# Patient Record
Sex: FEMALE | Race: WHITE | NOT HISPANIC OR LATINO | Employment: OTHER | URBAN - METROPOLITAN AREA
[De-identification: names, ages, dates, MRNs, and addresses within clinical notes are randomized per-mention and may not be internally consistent; named-entity substitution may affect disease eponyms.]

---

## 2018-05-24 ENCOUNTER — TRANSCRIBE ORDERS (OUTPATIENT)
Dept: ADMINISTRATIVE | Facility: HOSPITAL | Age: 53
End: 2018-05-24

## 2018-05-24 DIAGNOSIS — Z12.31 VISIT FOR SCREENING MAMMOGRAM: Primary | ICD-10-CM

## 2018-06-11 ENCOUNTER — APPOINTMENT (OUTPATIENT)
Dept: MAMMOGRAPHY | Facility: HOSPITAL | Age: 53
End: 2018-06-11

## 2022-01-26 ENCOUNTER — APPOINTMENT (OUTPATIENT)
Dept: MRI IMAGING | Facility: HOSPITAL | Age: 57
End: 2022-01-26

## 2022-01-26 ENCOUNTER — APPOINTMENT (OUTPATIENT)
Dept: GENERAL RADIOLOGY | Facility: HOSPITAL | Age: 57
End: 2022-01-26

## 2022-01-26 ENCOUNTER — APPOINTMENT (OUTPATIENT)
Dept: CT IMAGING | Facility: HOSPITAL | Age: 57
End: 2022-01-26

## 2022-01-26 ENCOUNTER — HOSPITAL ENCOUNTER (INPATIENT)
Facility: HOSPITAL | Age: 57
LOS: 2 days | Discharge: NURSING, REHAB OR LONG-TERM CARE | End: 2022-01-28
Attending: EMERGENCY MEDICINE | Admitting: FAMILY MEDICINE

## 2022-01-26 DIAGNOSIS — I16.1 HYPERTENSIVE EMERGENCY: ICD-10-CM

## 2022-01-26 DIAGNOSIS — I63.9 CEREBROVASCULAR ACCIDENT (CVA), UNSPECIFIED MECHANISM: Primary | ICD-10-CM

## 2022-01-26 LAB
ALBUMIN SERPL-MCNC: 4.8 G/DL (ref 3.5–5.2)
ALBUMIN/GLOB SERPL: 1.4 G/DL
ALP SERPL-CCNC: 134 U/L (ref 39–117)
ALT SERPL W P-5'-P-CCNC: 32 U/L (ref 1–33)
ANION GAP SERPL CALCULATED.3IONS-SCNC: 20.3 MMOL/L (ref 5–15)
AST SERPL-CCNC: 19 U/L (ref 1–32)
BACTERIA UR QL AUTO: ABNORMAL /HPF
BASOPHILS # BLD AUTO: 0.02 10*3/MM3 (ref 0–0.2)
BASOPHILS NFR BLD AUTO: 0.3 % (ref 0–1.5)
BILIRUB SERPL-MCNC: 0.8 MG/DL (ref 0–1.2)
BILIRUB UR QL STRIP: NEGATIVE
BUN SERPL-MCNC: 19 MG/DL (ref 6–20)
BUN/CREAT SERPL: 24.4 (ref 7–25)
CALCIUM SPEC-SCNC: 10.6 MG/DL (ref 8.6–10.5)
CHLORIDE SERPL-SCNC: 93 MMOL/L (ref 98–107)
CLARITY UR: CLEAR
CO2 SERPL-SCNC: 21.7 MMOL/L (ref 22–29)
COLOR UR: YELLOW
CREAT SERPL-MCNC: 0.78 MG/DL (ref 0.57–1)
DEPRECATED RDW RBC AUTO: 35.9 FL (ref 37–54)
EOSINOPHIL # BLD AUTO: 0 10*3/MM3 (ref 0–0.4)
EOSINOPHIL NFR BLD AUTO: 0 % (ref 0.3–6.2)
ERYTHROCYTE [DISTWIDTH] IN BLOOD BY AUTOMATED COUNT: 11.2 % (ref 12.3–15.4)
GFR SERPL CREATININE-BSD FRML MDRD: 76 ML/MIN/1.73
GLOBULIN UR ELPH-MCNC: 3.4 GM/DL
GLUCOSE BLDC GLUCOMTR-MCNC: 264 MG/DL (ref 70–130)
GLUCOSE BLDC GLUCOMTR-MCNC: 267 MG/DL (ref 70–130)
GLUCOSE BLDC GLUCOMTR-MCNC: 290 MG/DL (ref 70–130)
GLUCOSE SERPL-MCNC: 346 MG/DL (ref 65–99)
GLUCOSE UR STRIP-MCNC: ABNORMAL MG/DL
HBA1C MFR BLD: 12.3 % (ref 4.8–5.6)
HCT VFR BLD AUTO: 52.3 % (ref 34–46.6)
HGB BLD-MCNC: 18.9 G/DL (ref 12–15.9)
HGB UR QL STRIP.AUTO: NEGATIVE
HOLD SPECIMEN: NORMAL
HOLD SPECIMEN: NORMAL
HYALINE CASTS UR QL AUTO: ABNORMAL /LPF
IMM GRANULOCYTES # BLD AUTO: 0.02 10*3/MM3 (ref 0–0.05)
IMM GRANULOCYTES NFR BLD AUTO: 0.3 % (ref 0–0.5)
KETONES UR QL STRIP: ABNORMAL
LEUKOCYTE ESTERASE UR QL STRIP.AUTO: NEGATIVE
LIPASE SERPL-CCNC: 36 U/L (ref 13–60)
LYMPHOCYTES # BLD AUTO: 1.41 10*3/MM3 (ref 0.7–3.1)
LYMPHOCYTES NFR BLD AUTO: 24.6 % (ref 19.6–45.3)
MCH RBC QN AUTO: 32 PG (ref 26.6–33)
MCHC RBC AUTO-ENTMCNC: 36.1 G/DL (ref 31.5–35.7)
MCV RBC AUTO: 88.6 FL (ref 79–97)
MONOCYTES # BLD AUTO: 0.25 10*3/MM3 (ref 0.1–0.9)
MONOCYTES NFR BLD AUTO: 4.4 % (ref 5–12)
NEUTROPHILS NFR BLD AUTO: 4.04 10*3/MM3 (ref 1.7–7)
NEUTROPHILS NFR BLD AUTO: 70.4 % (ref 42.7–76)
NITRITE UR QL STRIP: NEGATIVE
NRBC BLD AUTO-RTO: 0 /100 WBC (ref 0–0.2)
PH UR STRIP.AUTO: <=5 [PH] (ref 5–8)
PLATELET # BLD AUTO: 202 10*3/MM3 (ref 140–450)
PMV BLD AUTO: 10.2 FL (ref 6–12)
POTASSIUM SERPL-SCNC: 3.7 MMOL/L (ref 3.5–5.2)
PROT SERPL-MCNC: 8.2 G/DL (ref 6–8.5)
PROT UR QL STRIP: ABNORMAL
RBC # BLD AUTO: 5.9 10*6/MM3 (ref 3.77–5.28)
RBC # UR STRIP: ABNORMAL /HPF
REF LAB TEST METHOD: ABNORMAL
SODIUM SERPL-SCNC: 135 MMOL/L (ref 136–145)
SP GR UR STRIP: >=1.03 (ref 1–1.03)
SQUAMOUS #/AREA URNS HPF: ABNORMAL /HPF
TROPONIN T SERPL-MCNC: <0.01 NG/ML (ref 0–0.03)
UROBILINOGEN UR QL STRIP: ABNORMAL
WBC # UR STRIP: ABNORMAL /HPF
WBC NRBC COR # BLD: 5.74 10*3/MM3 (ref 3.4–10.8)
WHOLE BLOOD HOLD SPECIMEN: NORMAL
WHOLE BLOOD HOLD SPECIMEN: NORMAL

## 2022-01-26 PROCEDURE — 85025 COMPLETE CBC W/AUTO DIFF WBC: CPT | Performed by: EMERGENCY MEDICINE

## 2022-01-26 PROCEDURE — 25010000002 IOPAMIDOL 61 % SOLUTION: Performed by: EMERGENCY MEDICINE

## 2022-01-26 PROCEDURE — 83036 HEMOGLOBIN GLYCOSYLATED A1C: CPT | Performed by: EMERGENCY MEDICINE

## 2022-01-26 PROCEDURE — 80053 COMPREHEN METABOLIC PANEL: CPT | Performed by: EMERGENCY MEDICINE

## 2022-01-26 PROCEDURE — 71045 X-RAY EXAM CHEST 1 VIEW: CPT

## 2022-01-26 PROCEDURE — 70496 CT ANGIOGRAPHY HEAD: CPT

## 2022-01-26 PROCEDURE — 63710000001 INSULIN ASPART PER 5 UNITS

## 2022-01-26 PROCEDURE — 82962 GLUCOSE BLOOD TEST: CPT

## 2022-01-26 PROCEDURE — 70450 CT HEAD/BRAIN W/O DYE: CPT

## 2022-01-26 PROCEDURE — 84484 ASSAY OF TROPONIN QUANT: CPT | Performed by: EMERGENCY MEDICINE

## 2022-01-26 PROCEDURE — 99285 EMERGENCY DEPT VISIT HI MDM: CPT

## 2022-01-26 PROCEDURE — 70551 MRI BRAIN STEM W/O DYE: CPT

## 2022-01-26 PROCEDURE — 99223 1ST HOSP IP/OBS HIGH 75: CPT | Performed by: FAMILY MEDICINE

## 2022-01-26 PROCEDURE — 81001 URINALYSIS AUTO W/SCOPE: CPT | Performed by: EMERGENCY MEDICINE

## 2022-01-26 PROCEDURE — 83690 ASSAY OF LIPASE: CPT | Performed by: EMERGENCY MEDICINE

## 2022-01-26 PROCEDURE — 99222 1ST HOSP IP/OBS MODERATE 55: CPT | Performed by: PSYCHIATRY & NEUROLOGY

## 2022-01-26 PROCEDURE — 93005 ELECTROCARDIOGRAM TRACING: CPT | Performed by: EMERGENCY MEDICINE

## 2022-01-26 PROCEDURE — 70498 CT ANGIOGRAPHY NECK: CPT

## 2022-01-26 RX ORDER — SODIUM CHLORIDE 0.9 % (FLUSH) 0.9 %
10 SYRINGE (ML) INJECTION AS NEEDED
Status: DISCONTINUED | OUTPATIENT
Start: 2022-01-26 | End: 2022-01-28 | Stop reason: HOSPADM

## 2022-01-26 RX ORDER — CALCIUM CARBONATE 200(500)MG
1 TABLET,CHEWABLE ORAL 2 TIMES DAILY PRN
Status: DISCONTINUED | OUTPATIENT
Start: 2022-01-26 | End: 2022-01-28 | Stop reason: HOSPADM

## 2022-01-26 RX ORDER — ONDANSETRON 4 MG/1
4 TABLET, FILM COATED ORAL EVERY 6 HOURS PRN
Status: DISCONTINUED | OUTPATIENT
Start: 2022-01-26 | End: 2022-01-28 | Stop reason: HOSPADM

## 2022-01-26 RX ORDER — ACETAMINOPHEN 325 MG/1
650 TABLET ORAL EVERY 4 HOURS PRN
Status: DISCONTINUED | OUTPATIENT
Start: 2022-01-26 | End: 2022-01-28 | Stop reason: HOSPADM

## 2022-01-26 RX ORDER — NICOTINE POLACRILEX 4 MG
1 LOZENGE BUCCAL
Status: DISCONTINUED | OUTPATIENT
Start: 2022-01-26 | End: 2022-01-28 | Stop reason: HOSPADM

## 2022-01-26 RX ORDER — ATORVASTATIN CALCIUM 80 MG/1
80 TABLET, FILM COATED ORAL NIGHTLY
Status: DISCONTINUED | OUTPATIENT
Start: 2022-01-26 | End: 2022-01-28 | Stop reason: HOSPADM

## 2022-01-26 RX ORDER — SODIUM CHLORIDE 9 MG/ML
50 INJECTION, SOLUTION INTRAVENOUS CONTINUOUS
Status: DISCONTINUED | OUTPATIENT
Start: 2022-01-26 | End: 2022-01-27

## 2022-01-26 RX ORDER — SODIUM CHLORIDE 0.9 % (FLUSH) 0.9 %
10 SYRINGE (ML) INJECTION EVERY 12 HOURS SCHEDULED
Status: DISCONTINUED | OUTPATIENT
Start: 2022-01-26 | End: 2022-01-28 | Stop reason: HOSPADM

## 2022-01-26 RX ORDER — ONDANSETRON 2 MG/ML
4 INJECTION INTRAMUSCULAR; INTRAVENOUS EVERY 6 HOURS PRN
Status: DISCONTINUED | OUTPATIENT
Start: 2022-01-26 | End: 2022-01-28 | Stop reason: HOSPADM

## 2022-01-26 RX ORDER — ALPRAZOLAM 0.25 MG/1
0.25 TABLET ORAL 2 TIMES DAILY PRN
Status: DISCONTINUED | OUTPATIENT
Start: 2022-01-26 | End: 2022-01-28 | Stop reason: HOSPADM

## 2022-01-26 RX ORDER — ASPIRIN 81 MG/1
324 TABLET, CHEWABLE ORAL ONCE
Status: COMPLETED | OUTPATIENT
Start: 2022-01-26 | End: 2022-01-26

## 2022-01-26 RX ORDER — ACETAMINOPHEN 650 MG/1
650 SUPPOSITORY RECTAL EVERY 4 HOURS PRN
Status: DISCONTINUED | OUTPATIENT
Start: 2022-01-26 | End: 2022-01-28 | Stop reason: HOSPADM

## 2022-01-26 RX ORDER — DEXTROSE MONOHYDRATE 25 G/50ML
25 INJECTION, SOLUTION INTRAVENOUS
Status: DISCONTINUED | OUTPATIENT
Start: 2022-01-26 | End: 2022-01-28 | Stop reason: HOSPADM

## 2022-01-26 RX ORDER — ASPIRIN 300 MG/1
300 SUPPOSITORY RECTAL DAILY
Status: DISCONTINUED | OUTPATIENT
Start: 2022-01-27 | End: 2022-01-28 | Stop reason: HOSPADM

## 2022-01-26 RX ORDER — METOPROLOL TARTRATE 5 MG/5ML
5 INJECTION INTRAVENOUS ONCE
Status: COMPLETED | OUTPATIENT
Start: 2022-01-26 | End: 2022-01-26

## 2022-01-26 RX ORDER — ASPIRIN 325 MG
325 TABLET ORAL DAILY
Status: DISCONTINUED | OUTPATIENT
Start: 2022-01-27 | End: 2022-01-28 | Stop reason: HOSPADM

## 2022-01-26 RX ADMIN — NICARDIPINE HYDROCHLORIDE 5 MG/HR: 0.1 INJECTION, SOLUTION INTRAVENOUS at 14:19

## 2022-01-26 RX ADMIN — INSULIN ASPART 6 UNITS: 100 INJECTION, SOLUTION INTRAVENOUS; SUBCUTANEOUS at 18:38

## 2022-01-26 RX ADMIN — ASPIRIN 81 MG CHEWABLE TABLET 324 MG: 81 TABLET CHEWABLE at 13:59

## 2022-01-26 RX ADMIN — ATORVASTATIN CALCIUM 80 MG: 80 TABLET, FILM COATED ORAL at 21:09

## 2022-01-26 RX ADMIN — NICARDIPINE HYDROCHLORIDE 10 MG/HR: 0.1 INJECTION, SOLUTION INTRAVENOUS at 20:44

## 2022-01-26 RX ADMIN — IOPAMIDOL 100 ML: 612 INJECTION, SOLUTION INTRAVENOUS at 13:50

## 2022-01-26 RX ADMIN — NICARDIPINE HYDROCHLORIDE 10 MG/HR: 0.1 INJECTION, SOLUTION INTRAVENOUS at 22:42

## 2022-01-26 RX ADMIN — SODIUM CHLORIDE, PRESERVATIVE FREE 10 ML: 5 INJECTION INTRAVENOUS at 21:09

## 2022-01-26 RX ADMIN — SODIUM CHLORIDE 50 ML/HR: 9 INJECTION, SOLUTION INTRAVENOUS at 21:05

## 2022-01-26 RX ADMIN — NICARDIPINE HYDROCHLORIDE 10 MG/HR: 0.1 INJECTION, SOLUTION INTRAVENOUS at 18:38

## 2022-01-26 RX ADMIN — ALPRAZOLAM 0.25 MG: 0.25 TABLET ORAL at 21:09

## 2022-01-26 RX ADMIN — METOPROLOL TARTRATE 5 MG: 1 INJECTION, SOLUTION INTRAVENOUS at 13:59

## 2022-01-27 ENCOUNTER — APPOINTMENT (OUTPATIENT)
Dept: CARDIOLOGY | Facility: HOSPITAL | Age: 57
End: 2022-01-27

## 2022-01-27 LAB
ANION GAP SERPL CALCULATED.3IONS-SCNC: 18 MMOL/L (ref 5–15)
BH CV ECHO MEAS - AO MAX PG (FULL): 4.9 MMHG
BH CV ECHO MEAS - AO MAX PG: 11 MMHG
BH CV ECHO MEAS - AO MEAN PG (FULL): 2 MMHG
BH CV ECHO MEAS - AO MEAN PG: 5 MMHG
BH CV ECHO MEAS - AO ROOT AREA (BSA CORRECTED): 1.5
BH CV ECHO MEAS - AO ROOT AREA: 5.3 CM^2
BH CV ECHO MEAS - AO ROOT DIAM: 2.6 CM
BH CV ECHO MEAS - AO V2 MAX: 168 CM/SEC
BH CV ECHO MEAS - AO V2 MEAN: 106 CM/SEC
BH CV ECHO MEAS - AO V2 VTI: 30.7 CM
BH CV ECHO MEAS - ASC AORTA: 2.6 CM
BH CV ECHO MEAS - AVA(I,A): 1.7 CM^2
BH CV ECHO MEAS - AVA(I,D): 1.7 CM^2
BH CV ECHO MEAS - AVA(V,A): 2 CM^2
BH CV ECHO MEAS - AVA(V,D): 2 CM^2
BH CV ECHO MEAS - BSA(HAYCOCK): 1.7 M^2
BH CV ECHO MEAS - BSA: 1.7 M^2
BH CV ECHO MEAS - BZI_BMI: 24.2 KILOGRAMS/M^2
BH CV ECHO MEAS - BZI_METRIC_HEIGHT: 162.6 CM
BH CV ECHO MEAS - BZI_METRIC_WEIGHT: 64 KG
BH CV ECHO MEAS - EDV(CUBED): 41.1 ML
BH CV ECHO MEAS - EDV(MOD-SP2): 74.1 ML
BH CV ECHO MEAS - EDV(MOD-SP4): 64.9 ML
BH CV ECHO MEAS - EDV(TEICH): 49.1 ML
BH CV ECHO MEAS - EF(CUBED): 94.4 %
BH CV ECHO MEAS - EF(MOD-BP): 78 %
BH CV ECHO MEAS - EF(MOD-SP2): 83 %
BH CV ECHO MEAS - EF(MOD-SP4): 72.3 %
BH CV ECHO MEAS - EF(TEICH): 91.2 %
BH CV ECHO MEAS - ESV(CUBED): 2.3 ML
BH CV ECHO MEAS - ESV(MOD-SP2): 12.6 ML
BH CV ECHO MEAS - ESV(MOD-SP4): 18 ML
BH CV ECHO MEAS - ESV(TEICH): 4.3 ML
BH CV ECHO MEAS - FS: 61.7 %
BH CV ECHO MEAS - IVS/LVPW: 1
BH CV ECHO MEAS - IVSD: 1.4 CM
BH CV ECHO MEAS - LA DIMENSION: 2.4 CM
BH CV ECHO MEAS - LA/AO: 0.93
BH CV ECHO MEAS - LAD MAJOR: 4.7 CM
BH CV ECHO MEAS - LAT PEAK E' VEL: 5.3 CM/SEC
BH CV ECHO MEAS - LATERAL E/E' RATIO: 11.8
BH CV ECHO MEAS - LV DIASTOLIC VOL/BSA (35-75): 38.5 ML/M^2
BH CV ECHO MEAS - LV MASS(C)D: 166.6 GRAMS
BH CV ECHO MEAS - LV MASS(C)DI: 98.8 GRAMS/M^2
BH CV ECHO MEAS - LV MAX PG: 6.1 MMHG
BH CV ECHO MEAS - LV MEAN PG: 3 MMHG
BH CV ECHO MEAS - LV SYSTOLIC VOL/BSA (12-30): 10.7 ML/M^2
BH CV ECHO MEAS - LV V1 MAX: 123 CM/SEC
BH CV ECHO MEAS - LV V1 MEAN: 82.8 CM/SEC
BH CV ECHO MEAS - LV V1 VTI: 18.6 CM
BH CV ECHO MEAS - LVIDD: 3.5 CM
BH CV ECHO MEAS - LVIDS: 1.3 CM
BH CV ECHO MEAS - LVLD AP2: 6.8 CM
BH CV ECHO MEAS - LVLD AP4: 6.9 CM
BH CV ECHO MEAS - LVLS AP2: 5.3 CM
BH CV ECHO MEAS - LVLS AP4: 4 CM
BH CV ECHO MEAS - LVOT AREA (M): 2.8 CM^2
BH CV ECHO MEAS - LVOT AREA: 2.7 CM^2
BH CV ECHO MEAS - LVOT DIAM: 1.9 CM
BH CV ECHO MEAS - LVPWD: 1.4 CM
BH CV ECHO MEAS - MED PEAK E' VEL: 4.7 CM/SEC
BH CV ECHO MEAS - MEDIAL E/E' RATIO: 13.5
BH CV ECHO MEAS - MV A MAX VEL: 82 CM/SEC
BH CV ECHO MEAS - MV DEC SLOPE: 297 CM/SEC^2
BH CV ECHO MEAS - MV DEC TIME: 0.28 SEC
BH CV ECHO MEAS - MV E MAX VEL: 63.1 CM/SEC
BH CV ECHO MEAS - MV E/A: 0.77
BH CV ECHO MEAS - MV MAX PG: 4.7 MMHG
BH CV ECHO MEAS - MV MEAN PG: 1 MMHG
BH CV ECHO MEAS - MV P1/2T MAX VEL: 78.7 CM/SEC
BH CV ECHO MEAS - MV P1/2T: 77.6 MSEC
BH CV ECHO MEAS - MV V2 MAX: 108 CM/SEC
BH CV ECHO MEAS - MV V2 MEAN: 46.8 CM/SEC
BH CV ECHO MEAS - MV V2 VTI: 28.4 CM
BH CV ECHO MEAS - MVA P1/2T LCG: 2.8 CM^2
BH CV ECHO MEAS - MVA(P1/2T): 2.8 CM^2
BH CV ECHO MEAS - MVA(VTI): 1.8 CM^2
BH CV ECHO MEAS - PA ACC TIME: 0.09 SEC
BH CV ECHO MEAS - PA MAX PG (FULL): 2.4 MMHG
BH CV ECHO MEAS - PA MAX PG: 6.1 MMHG
BH CV ECHO MEAS - PA MEAN PG (FULL): 1 MMHG
BH CV ECHO MEAS - PA MEAN PG: 3 MMHG
BH CV ECHO MEAS - PA PR(ACCEL): 40.8 MMHG
BH CV ECHO MEAS - PA V2 MAX: 123 CM/SEC
BH CV ECHO MEAS - PA V2 MEAN: 78.6 CM/SEC
BH CV ECHO MEAS - PA V2 VTI: 27.3 CM
BH CV ECHO MEAS - PVA(I,A): 1.6 CM^2
BH CV ECHO MEAS - PVA(I,D): 1.6 CM^2
BH CV ECHO MEAS - PVA(V,A): 2 CM^2
BH CV ECHO MEAS - PVA(V,D): 2 CM^2
BH CV ECHO MEAS - QP/QS: 0.84
BH CV ECHO MEAS - RAP SYSTOLE: 3 MMHG
BH CV ECHO MEAS - RV MAX PG: 3.7 MMHG
BH CV ECHO MEAS - RV MEAN PG: 2 MMHG
BH CV ECHO MEAS - RV V1 MAX: 96.1 CM/SEC
BH CV ECHO MEAS - RV V1 MEAN: 62.3 CM/SEC
BH CV ECHO MEAS - RV V1 VTI: 17.1 CM
BH CV ECHO MEAS - RVOT AREA: 2.5 CM^2
BH CV ECHO MEAS - RVOT DIAM: 1.8 CM
BH CV ECHO MEAS - SI(AO): 96.7 ML/M^2
BH CV ECHO MEAS - SI(CUBED): 23 ML/M^2
BH CV ECHO MEAS - SI(LVOT): 30.3 ML/M^2
BH CV ECHO MEAS - SI(MOD-SP2): 36.5 ML/M^2
BH CV ECHO MEAS - SI(MOD-SP4): 27.8 ML/M^2
BH CV ECHO MEAS - SI(TEICH): 26.6 ML/M^2
BH CV ECHO MEAS - SV(AO): 163 ML
BH CV ECHO MEAS - SV(CUBED): 38.8 ML
BH CV ECHO MEAS - SV(LVOT): 51.1 ML
BH CV ECHO MEAS - SV(MOD-SP2): 61.5 ML
BH CV ECHO MEAS - SV(MOD-SP4): 46.9 ML
BH CV ECHO MEAS - SV(RVOT): 43 ML
BH CV ECHO MEAS - SV(TEICH): 44.8 ML
BH CV ECHO MEAS - TAPSE (>1.6): 0.97 CM
BH CV ECHO MEASUREMENTS AVERAGE E/E' RATIO: 12.62
BH CV XLRA - RV BASE: 2.2 CM
BH CV XLRA - RV LENGTH: 5.9 CM
BH CV XLRA - RV MID: 1.7 CM
BH CV XLRA - TDI S': 14 CM/SEC
BUN SERPL-MCNC: 25 MG/DL (ref 6–20)
BUN/CREAT SERPL: 33.3 (ref 7–25)
CALCIUM SPEC-SCNC: 9.5 MG/DL (ref 8.6–10.5)
CHLORIDE SERPL-SCNC: 100 MMOL/L (ref 98–107)
CHOLEST SERPL-MCNC: 282 MG/DL (ref 0–200)
CO2 SERPL-SCNC: 18 MMOL/L (ref 22–29)
CREAT SERPL-MCNC: 0.75 MG/DL (ref 0.57–1)
CRP SERPL-MCNC: 0.9 MG/DL (ref 0–0.5)
D DIMER PPP FEU-MCNC: 0.29 MCGFEU/ML (ref 0–0.57)
DEPRECATED RDW RBC AUTO: 37.3 FL (ref 37–54)
ERYTHROCYTE [DISTWIDTH] IN BLOOD BY AUTOMATED COUNT: 11.6 % (ref 12.3–15.4)
ERYTHROCYTE [SEDIMENTATION RATE] IN BLOOD: 11 MM/HR (ref 0–20)
GFR SERPL CREATININE-BSD FRML MDRD: 79 ML/MIN/1.73
GLUCOSE BLDC GLUCOMTR-MCNC: 132 MG/DL (ref 70–130)
GLUCOSE BLDC GLUCOMTR-MCNC: 213 MG/DL (ref 70–130)
GLUCOSE BLDC GLUCOMTR-MCNC: 302 MG/DL (ref 70–130)
GLUCOSE SERPL-MCNC: 236 MG/DL (ref 65–99)
HCT VFR BLD AUTO: 45.6 % (ref 34–46.6)
HDLC SERPL-MCNC: 36 MG/DL (ref 40–60)
HGB BLD-MCNC: 16.3 G/DL (ref 12–15.9)
LDLC SERPL CALC-MCNC: 184 MG/DL (ref 0–100)
LDLC/HDLC SERPL: 5.09 {RATIO}
LEFT ATRIUM VOLUME INDEX: 15.7 ML/M^2
LEFT ATRIUM VOLUME: 26.5 ML
MCH RBC QN AUTO: 31.9 PG (ref 26.6–33)
MCHC RBC AUTO-ENTMCNC: 35.7 G/DL (ref 31.5–35.7)
MCV RBC AUTO: 89.2 FL (ref 79–97)
PLATELET # BLD AUTO: 210 10*3/MM3 (ref 140–450)
PMV BLD AUTO: 9.8 FL (ref 6–12)
POTASSIUM SERPL-SCNC: 3.6 MMOL/L (ref 3.5–5.2)
RBC # BLD AUTO: 5.11 10*6/MM3 (ref 3.77–5.28)
SODIUM SERPL-SCNC: 136 MMOL/L (ref 136–145)
TRIGL SERPL-MCNC: 313 MG/DL (ref 0–150)
TSH SERPL DL<=0.05 MIU/L-ACNC: 3.2 UIU/ML (ref 0.27–4.2)
VLDLC SERPL-MCNC: 62 MG/DL (ref 5–40)
WBC NRBC COR # BLD: 6.48 10*3/MM3 (ref 3.4–10.8)

## 2022-01-27 PROCEDURE — 63710000001 INSULIN ASPART PER 5 UNITS: Performed by: FAMILY MEDICINE

## 2022-01-27 PROCEDURE — 80061 LIPID PANEL: CPT | Performed by: FAMILY MEDICINE

## 2022-01-27 PROCEDURE — 99233 SBSQ HOSP IP/OBS HIGH 50: CPT | Performed by: PSYCHIATRY & NEUROLOGY

## 2022-01-27 PROCEDURE — 93306 TTE W/DOPPLER COMPLETE: CPT

## 2022-01-27 PROCEDURE — 93306 TTE W/DOPPLER COMPLETE: CPT | Performed by: INTERNAL MEDICINE

## 2022-01-27 PROCEDURE — 85379 FIBRIN DEGRADATION QUANT: CPT | Performed by: FAMILY MEDICINE

## 2022-01-27 PROCEDURE — 25010000002 SULFUR HEXAFLUORIDE MICROSPH 60.7-25 MG RECONSTITUTED SUSPENSION: Performed by: FAMILY MEDICINE

## 2022-01-27 PROCEDURE — 86140 C-REACTIVE PROTEIN: CPT | Performed by: FAMILY MEDICINE

## 2022-01-27 PROCEDURE — 99232 SBSQ HOSP IP/OBS MODERATE 35: CPT | Performed by: FAMILY MEDICINE

## 2022-01-27 PROCEDURE — 85651 RBC SED RATE NONAUTOMATED: CPT | Performed by: FAMILY MEDICINE

## 2022-01-27 PROCEDURE — 84443 ASSAY THYROID STIM HORMONE: CPT | Performed by: FAMILY MEDICINE

## 2022-01-27 PROCEDURE — 85027 COMPLETE CBC AUTOMATED: CPT | Performed by: FAMILY MEDICINE

## 2022-01-27 PROCEDURE — 80048 BASIC METABOLIC PNL TOTAL CA: CPT | Performed by: FAMILY MEDICINE

## 2022-01-27 PROCEDURE — 97166 OT EVAL MOD COMPLEX 45 MIN: CPT

## 2022-01-27 PROCEDURE — 92610 EVALUATE SWALLOWING FUNCTION: CPT

## 2022-01-27 PROCEDURE — 82962 GLUCOSE BLOOD TEST: CPT

## 2022-01-27 PROCEDURE — 97162 PT EVAL MOD COMPLEX 30 MIN: CPT

## 2022-01-27 RX ORDER — LISINOPRIL 20 MG/1
20 TABLET ORAL
Status: DISCONTINUED | OUTPATIENT
Start: 2022-01-27 | End: 2022-01-28 | Stop reason: HOSPADM

## 2022-01-27 RX ORDER — AMLODIPINE BESYLATE 5 MG/1
5 TABLET ORAL
Status: DISCONTINUED | OUTPATIENT
Start: 2022-01-27 | End: 2022-01-28 | Stop reason: HOSPADM

## 2022-01-27 RX ADMIN — NICARDIPINE HYDROCHLORIDE 2.5 MG/HR: 0.1 INJECTION, SOLUTION INTRAVENOUS at 07:54

## 2022-01-27 RX ADMIN — INSULIN ASPART 2 UNITS: 100 INJECTION, SOLUTION INTRAVENOUS; SUBCUTANEOUS at 13:03

## 2022-01-27 RX ADMIN — SODIUM CHLORIDE, PRESERVATIVE FREE 10 ML: 5 INJECTION INTRAVENOUS at 20:29

## 2022-01-27 RX ADMIN — AMLODIPINE BESYLATE 5 MG: 5 TABLET ORAL at 13:02

## 2022-01-27 RX ADMIN — LISINOPRIL 20 MG: 20 TABLET ORAL at 09:36

## 2022-01-27 RX ADMIN — SULFUR HEXAFLUORIDE 7 ML: KIT at 12:04

## 2022-01-27 RX ADMIN — INSULIN ASPART 6 UNITS: 100 INJECTION, SOLUTION INTRAVENOUS; SUBCUTANEOUS at 00:22

## 2022-01-27 RX ADMIN — INSULIN ASPART 6 UNITS: 100 INJECTION, SOLUTION INTRAVENOUS; SUBCUTANEOUS at 06:23

## 2022-01-27 RX ADMIN — ATORVASTATIN CALCIUM 80 MG: 80 TABLET, FILM COATED ORAL at 20:29

## 2022-01-27 RX ADMIN — SODIUM CHLORIDE, PRESERVATIVE FREE 10 ML: 5 INJECTION INTRAVENOUS at 08:03

## 2022-01-27 RX ADMIN — NICARDIPINE HYDROCHLORIDE 10 MG/HR: 0.1 INJECTION, SOLUTION INTRAVENOUS at 00:30

## 2022-01-27 RX ADMIN — ASPIRIN 325 MG ORAL TABLET 325 MG: 325 PILL ORAL at 08:02

## 2022-01-27 RX ADMIN — NICARDIPINE HYDROCHLORIDE 5 MG/HR: 0.1 INJECTION, SOLUTION INTRAVENOUS at 03:40

## 2022-01-28 ENCOUNTER — HOSPITAL ENCOUNTER (INPATIENT)
Facility: HOSPITAL | Age: 57
LOS: 17 days | Discharge: NURSING, REHAB OR LONG-TERM CARE | DRG: 056 | End: 2022-02-14
Attending: INTERNAL MEDICINE | Admitting: INTERNAL MEDICINE
Payer: COMMERCIAL

## 2022-01-28 VITALS
HEIGHT: 64 IN | RESPIRATION RATE: 18 BRPM | OXYGEN SATURATION: 96 % | SYSTOLIC BLOOD PRESSURE: 150 MMHG | BODY MASS INDEX: 25.18 KG/M2 | HEART RATE: 105 BPM | DIASTOLIC BLOOD PRESSURE: 96 MMHG | WEIGHT: 147.49 LBS | TEMPERATURE: 98.3 F

## 2022-01-28 PROBLEM — R53.81 DECLINING FUNCTIONAL STATUS: Status: ACTIVE | Noted: 2022-01-28

## 2022-01-28 LAB
ANION GAP SERPL CALCULATED.3IONS-SCNC: 11.6 MMOL/L (ref 5–15)
BUN SERPL-MCNC: 18 MG/DL (ref 6–20)
BUN/CREAT SERPL: 31.6 (ref 7–25)
CALCIUM SPEC-SCNC: 9.4 MG/DL (ref 8.6–10.5)
CHLORIDE SERPL-SCNC: 102 MMOL/L (ref 98–107)
CO2 SERPL-SCNC: 20.4 MMOL/L (ref 22–29)
CREAT SERPL-MCNC: 0.57 MG/DL (ref 0.57–1)
DEPRECATED RDW RBC AUTO: 37.5 FL (ref 37–54)
ERYTHROCYTE [DISTWIDTH] IN BLOOD BY AUTOMATED COUNT: 11.5 % (ref 12.3–15.4)
GFR SERPL CREATININE-BSD FRML MDRD: 109 ML/MIN/1.73
GLUCOSE BLD-MCNC: 222 MG/DL (ref 74–106)
GLUCOSE BLD-MCNC: 352 MG/DL (ref 74–106)
GLUCOSE BLDC GLUCOMTR-MCNC: 297 MG/DL (ref 70–130)
GLUCOSE BLDC GLUCOMTR-MCNC: 345 MG/DL (ref 70–130)
GLUCOSE SERPL-MCNC: 300 MG/DL (ref 65–99)
HCT VFR BLD AUTO: 45.8 % (ref 34–46.6)
HGB BLD-MCNC: 16.4 G/DL (ref 12–15.9)
MCH RBC QN AUTO: 32.2 PG (ref 26.6–33)
MCHC RBC AUTO-ENTMCNC: 35.8 G/DL (ref 31.5–35.7)
MCV RBC AUTO: 90 FL (ref 79–97)
PERFORMED ON: ABNORMAL
PERFORMED ON: ABNORMAL
PLATELET # BLD AUTO: 167 10*3/MM3 (ref 140–450)
PMV BLD AUTO: 10.1 FL (ref 6–12)
POTASSIUM SERPL-SCNC: 3.3 MMOL/L (ref 3.5–5.2)
RBC # BLD AUTO: 5.09 10*6/MM3 (ref 3.77–5.28)
SODIUM SERPL-SCNC: 134 MMOL/L (ref 136–145)
WBC NRBC COR # BLD: 5.83 10*3/MM3 (ref 3.4–10.8)

## 2022-01-28 PROCEDURE — 82962 GLUCOSE BLOOD TEST: CPT

## 2022-01-28 PROCEDURE — 6370000000 HC RX 637 (ALT 250 FOR IP): Performed by: PHYSICIAN ASSISTANT

## 2022-01-28 PROCEDURE — 85027 COMPLETE CBC AUTOMATED: CPT | Performed by: FAMILY MEDICINE

## 2022-01-28 PROCEDURE — 63710000001 INSULIN ASPART PER 5 UNITS: Performed by: FAMILY MEDICINE

## 2022-01-28 PROCEDURE — 99239 HOSP IP/OBS DSCHRG MGMT >30: CPT | Performed by: FAMILY MEDICINE

## 2022-01-28 PROCEDURE — 80048 BASIC METABOLIC PNL TOTAL CA: CPT | Performed by: FAMILY MEDICINE

## 2022-01-28 PROCEDURE — 51702 INSERT TEMP BLADDER CATH: CPT

## 2022-01-28 PROCEDURE — 1200000002 HC SEMI PRIVATE SWING BED

## 2022-01-28 PROCEDURE — 92523 SPEECH SOUND LANG COMPREHEN: CPT

## 2022-01-28 PROCEDURE — 63710000001 INSULIN DETEMIR PER 5 UNITS: Performed by: FAMILY MEDICINE

## 2022-01-28 PROCEDURE — 6360000002 HC RX W HCPCS: Performed by: PHYSICIAN ASSISTANT

## 2022-01-28 RX ORDER — ACETAMINOPHEN 650 MG/1
650 SUPPOSITORY RECTAL EVERY 6 HOURS PRN
Status: DISCONTINUED | OUTPATIENT
Start: 2022-01-28 | End: 2022-02-14 | Stop reason: HOSPADM

## 2022-01-28 RX ORDER — LISINOPRIL 20 MG/1
20 TABLET ORAL DAILY
Status: ON HOLD | COMMUNITY
End: 2022-02-14 | Stop reason: SDUPTHER

## 2022-01-28 RX ORDER — ATORVASTATIN CALCIUM 80 MG/1
80 TABLET, FILM COATED ORAL NIGHTLY
Status: ON HOLD | COMMUNITY
End: 2022-02-14 | Stop reason: SDUPTHER

## 2022-01-28 RX ORDER — AMLODIPINE BESYLATE 5 MG/1
5 TABLET ORAL DAILY
Status: ON HOLD | COMMUNITY
End: 2022-02-14 | Stop reason: HOSPADM

## 2022-01-28 RX ORDER — LISINOPRIL 20 MG/1
20 TABLET ORAL DAILY
Status: DISCONTINUED | OUTPATIENT
Start: 2022-01-29 | End: 2022-02-14 | Stop reason: HOSPADM

## 2022-01-28 RX ORDER — INSULIN GLARGINE 100 [IU]/ML
10 INJECTION, SOLUTION SUBCUTANEOUS NIGHTLY
Status: DISCONTINUED | OUTPATIENT
Start: 2022-01-28 | End: 2022-01-29

## 2022-01-28 RX ORDER — ONDANSETRON 4 MG/1
4 TABLET, ORALLY DISINTEGRATING ORAL EVERY 8 HOURS PRN
Status: DISCONTINUED | OUTPATIENT
Start: 2022-01-28 | End: 2022-02-14 | Stop reason: HOSPADM

## 2022-01-28 RX ORDER — ONDANSETRON 2 MG/ML
4 INJECTION INTRAMUSCULAR; INTRAVENOUS EVERY 6 HOURS PRN
Status: DISCONTINUED | OUTPATIENT
Start: 2022-01-28 | End: 2022-02-14 | Stop reason: HOSPADM

## 2022-01-28 RX ORDER — INSULIN DETEMIR 100 [IU]/ML
10 INJECTION, SOLUTION SUBCUTANEOUS NIGHTLY
Status: ON HOLD | COMMUNITY
End: 2022-02-14 | Stop reason: HOSPADM

## 2022-01-28 RX ORDER — DEXTROSE MONOHYDRATE 25 G/50ML
12.5 INJECTION, SOLUTION INTRAVENOUS PRN
Status: DISCONTINUED | OUTPATIENT
Start: 2022-01-28 | End: 2022-02-14 | Stop reason: HOSPADM

## 2022-01-28 RX ORDER — AMLODIPINE BESYLATE 5 MG/1
5 TABLET ORAL DAILY
Status: DISCONTINUED | OUTPATIENT
Start: 2022-01-29 | End: 2022-01-29

## 2022-01-28 RX ORDER — ATORVASTATIN CALCIUM 80 MG/1
80 TABLET, FILM COATED ORAL NIGHTLY
Start: 2022-01-28

## 2022-01-28 RX ORDER — LISINOPRIL 20 MG/1
20 TABLET ORAL
Start: 2022-01-29

## 2022-01-28 RX ORDER — AMLODIPINE BESYLATE 5 MG/1
5 TABLET ORAL
Start: 2022-01-29 | End: 2022-12-16 | Stop reason: ALTCHOICE

## 2022-01-28 RX ORDER — HYDRALAZINE HYDROCHLORIDE 25 MG/1
25 TABLET, FILM COATED ORAL 3 TIMES DAILY PRN
Status: DISCONTINUED | OUTPATIENT
Start: 2022-01-28 | End: 2022-02-14 | Stop reason: HOSPADM

## 2022-01-28 RX ORDER — DEXTROSE MONOHYDRATE 50 MG/ML
100 INJECTION, SOLUTION INTRAVENOUS PRN
Status: DISCONTINUED | OUTPATIENT
Start: 2022-01-28 | End: 2022-02-14 | Stop reason: HOSPADM

## 2022-01-28 RX ORDER — POLYETHYLENE GLYCOL 3350 17 G/17G
17 POWDER, FOR SOLUTION ORAL DAILY PRN
Status: DISCONTINUED | OUTPATIENT
Start: 2022-01-28 | End: 2022-02-14 | Stop reason: HOSPADM

## 2022-01-28 RX ORDER — ASPIRIN 325 MG
325 TABLET ORAL DAILY
Status: ON HOLD | COMMUNITY
End: 2022-02-14 | Stop reason: SDUPTHER

## 2022-01-28 RX ORDER — ACETAMINOPHEN 325 MG/1
650 TABLET ORAL EVERY 6 HOURS PRN
Status: DISCONTINUED | OUTPATIENT
Start: 2022-01-28 | End: 2022-02-14 | Stop reason: HOSPADM

## 2022-01-28 RX ORDER — NICOTINE POLACRILEX 4 MG
15 LOZENGE BUCCAL PRN
Status: DISCONTINUED | OUTPATIENT
Start: 2022-01-28 | End: 2022-02-14 | Stop reason: HOSPADM

## 2022-01-28 RX ORDER — ASPIRIN 325 MG
325 TABLET ORAL DAILY
Start: 2022-01-29 | End: 2022-06-14 | Stop reason: DRUGHIGH

## 2022-01-28 RX ORDER — ASPIRIN 325 MG
325 TABLET ORAL DAILY
Status: DISCONTINUED | OUTPATIENT
Start: 2022-01-29 | End: 2022-02-14 | Stop reason: HOSPADM

## 2022-01-28 RX ORDER — ATORVASTATIN CALCIUM 80 MG/1
80 TABLET, FILM COATED ORAL NIGHTLY
Status: DISCONTINUED | OUTPATIENT
Start: 2022-01-28 | End: 2022-02-14 | Stop reason: HOSPADM

## 2022-01-28 RX ADMIN — ENOXAPARIN SODIUM 40 MG: 100 INJECTION SUBCUTANEOUS at 20:37

## 2022-01-28 RX ADMIN — INSULIN GLARGINE 10 UNITS: 100 INJECTION, SOLUTION SUBCUTANEOUS at 20:38

## 2022-01-28 RX ADMIN — HYDRALAZINE HYDROCHLORIDE 25 MG: 25 TABLET, FILM COATED ORAL at 21:07

## 2022-01-28 RX ADMIN — AMLODIPINE BESYLATE 5 MG: 5 TABLET ORAL at 08:21

## 2022-01-28 RX ADMIN — ASPIRIN 325 MG ORAL TABLET 325 MG: 325 PILL ORAL at 08:21

## 2022-01-28 RX ADMIN — INSULIN ASPART 6 UNITS: 100 INJECTION, SOLUTION INTRAVENOUS; SUBCUTANEOUS at 12:14

## 2022-01-28 RX ADMIN — Medication 3 UNITS: at 20:39

## 2022-01-28 RX ADMIN — INSULIN ASPART 9 UNITS: 100 INJECTION, SOLUTION INTRAVENOUS; SUBCUTANEOUS at 06:38

## 2022-01-28 RX ADMIN — SODIUM CHLORIDE, PRESERVATIVE FREE 10 ML: 5 INJECTION INTRAVENOUS at 08:21

## 2022-01-28 RX ADMIN — INSULIN DETEMIR 10 UNITS: 100 INJECTION, SOLUTION SUBCUTANEOUS at 09:34

## 2022-01-28 RX ADMIN — LISINOPRIL 20 MG: 20 TABLET ORAL at 08:21

## 2022-01-28 RX ADMIN — METFORMIN HYDROCHLORIDE 500 MG: 500 TABLET ORAL at 18:35

## 2022-01-28 RX ADMIN — ATORVASTATIN CALCIUM 80 MG: 80 TABLET, FILM COATED ORAL at 20:37

## 2022-01-28 RX ADMIN — Medication 2 UNITS: at 18:00

## 2022-01-28 NOTE — FLOWSHEET NOTE
Pt resting quietly in bed. Admission assessment complete, respirations equal and unlabored. Pt instructed to call out with any needs or concerns, none at this time. Reviewed plan of care with pt, verbalized understanding. Call bell within pt reach. Pt is alert and oriented x 4, no movement on right side, arm or leg. Pt is has dysarthria at times. Pt had bm upon arrival to medical unit and pure wick placed at this time for incontinent episodes. 01/28/22 1500   Assessment   Charting Type Admission   Neurological   Neuro (WDL) X   Level of Consciousness Alert (0)   Orientation Level Oriented X4   Cognition Appropriate judgement; Appropriate safety awareness   Language Clear   R Hand  Absent   L Hand  Strong   R Foot Dorsiflexion Absent   L Foot Dorsiflexion Strong   R Foot Plantar Flexion Absent   L Foot Plantar Flexion Strong   RUE Motor Response Flaccid   Sensation RUE Full sensation   LUE Motor Response Responds to command;Normal extension;Normal flexion   Sensation LUE Full sensation   RLE Motor Response Flaccid   Sensation RLE Full sensation   LLE Motor Response Responds to command   Sensation LLE Full sensation   [place] Coma Scale   Eye Opening 4   Best Verbal Response 5   Best Motor Response 6   [place] Coma Scale Score 15   HEENT   HEENT (WDL) WDL   Respiratory   Respiratory (WDL) WDL   Respiratory Pattern Regular   Respiratory Depth Normal   Respiratory Quality/Effort Unlabored   Chest Assessment Chest expansion symmetrical   L Breath Sounds Clear;Diminished   R Breath Sounds Clear;Diminished   Breath Sounds   Right Upper Lobe Clear   Right Middle Lobe Clear   Right Lower Lobe Diminished   Left Upper Lobe Clear   Left Lower Lobe Diminished   Cardiac   Cardiac (WDL) WDL   Gastrointestinal   Abdominal (WDL) WDL   Peripheral Vascular   Peripheral Vascular (WDL) WDL   Edema None   Skin Color/Condition   Skin Color/Condition (WDL) WDL   Skin Integrity   Skin Integrity (WDL) WDL   Musculoskeletal Musculoskeletal (WDL) X   RUE Flaccid   LUE Full movement   RL Extremity Flaccid   LL Extremity Full movement   Genitourinary   Genitourinary (WDL) X  (urinary retention, incontinent at times(pure wick in place) )   Psychosocial   Psychosocial (WDL) WDL

## 2022-01-28 NOTE — LETTER
Mrs. Melinda Fraga is a very pleasant, motivated patient that is recovering from a CVA on 01/26/2022. She is independent at baseline and would like to regain independence if possible. Family is involved and willing to assist in any way. Plan is to return home with family assist at [place]. Pt is doing well with therapy and would like to increase her therapy to inpatient acute level. #:        The PA here would be glad to speak to you all if you or your provider have any questions. Thank you,   Masha Elizabeth, RN  Care Coordinator  [facility]  [address], [place]  Cell:  [phone]  Office:  [phone]  Fax:  [phone]  [email or web address]

## 2022-01-28 NOTE — CARE COORDINATION
.8/17/3926    I certify that the skilled nursing and/or rehabilitation services are required to be given on a daily basis, which as a practical matter can only be provided in a skilled nursing unit on an inpatient basis.     Electronically signed by Taryn Villegas RN on 1/28/2022 at 4:32 PM

## 2022-01-29 PROBLEM — E87.6 HYPOKALEMIA: Status: ACTIVE | Noted: 2022-01-29

## 2022-01-29 PROBLEM — U07.1 COVID-19: Status: ACTIVE | Noted: 2022-01-29

## 2022-01-29 PROBLEM — E11.9 TYPE 2 DIABETES MELLITUS (HCC): Status: ACTIVE | Noted: 2022-01-29

## 2022-01-29 PROBLEM — I10 UNCONTROLLED HYPERTENSION: Status: ACTIVE | Noted: 2022-01-29

## 2022-01-29 PROBLEM — I63.9 ACUTE CVA (CEREBROVASCULAR ACCIDENT) (HCC): Status: ACTIVE | Noted: 2022-01-29

## 2022-01-29 PROBLEM — R33.9 URINARY RETENTION: Status: ACTIVE | Noted: 2022-01-29

## 2022-01-29 LAB
A/G RATIO: 1.2 (ref 0.8–2)
ALBUMIN SERPL-MCNC: 3.5 G/DL (ref 3.4–4.8)
ALP BLD-CCNC: 103 U/L (ref 25–100)
ALT SERPL-CCNC: 20 U/L (ref 4–36)
ANION GAP SERPL CALCULATED.3IONS-SCNC: 15 MMOL/L (ref 3–16)
AST SERPL-CCNC: 16 U/L (ref 8–33)
BILIRUB SERPL-MCNC: 0.6 MG/DL (ref 0.3–1.2)
BUN BLDV-MCNC: 17 MG/DL (ref 6–20)
CALCIUM SERPL-MCNC: 9.2 MG/DL (ref 8.5–10.5)
CHLORIDE BLD-SCNC: 102 MMOL/L (ref 98–107)
CO2: 21 MMOL/L (ref 20–30)
CREAT SERPL-MCNC: <0.5 MG/DL (ref 0.4–1.2)
GFR AFRICAN AMERICAN: >59
GFR NON-AFRICAN AMERICAN: >60
GLOBULIN: 2.9 G/DL
GLUCOSE BLD-MCNC: 221 MG/DL (ref 74–106)
GLUCOSE BLD-MCNC: 227 MG/DL (ref 74–106)
GLUCOSE BLD-MCNC: 241 MG/DL (ref 74–106)
GLUCOSE BLD-MCNC: 246 MG/DL (ref 74–106)
GLUCOSE BLD-MCNC: 261 MG/DL (ref 74–106)
GLUCOSE BLD-MCNC: 272 MG/DL (ref 74–106)
HCT VFR BLD CALC: 45.9 % (ref 37–47)
HEMOGLOBIN: 16 G/DL (ref 11.5–16.5)
MAGNESIUM: 1.8 MG/DL (ref 1.7–2.4)
MCH RBC QN AUTO: 31.6 PG (ref 27–32)
MCHC RBC AUTO-ENTMCNC: 34.9 G/DL (ref 31–35)
MCV RBC AUTO: 90.5 FL (ref 80–100)
PDW BLD-RTO: 11.5 % (ref 11–16)
PERFORMED ON: ABNORMAL
PLATELET # BLD: 193 K/UL (ref 150–400)
PMV BLD AUTO: 10.5 FL (ref 6–10)
POTASSIUM REFLEX MAGNESIUM: 2.9 MMOL/L (ref 3.4–5.1)
RBC # BLD: 5.07 M/UL (ref 3.8–5.8)
SODIUM BLD-SCNC: 138 MMOL/L (ref 136–145)
TOTAL PROTEIN: 6.4 G/DL (ref 6.4–8.3)
WBC # BLD: 7.3 K/UL (ref 4–11)

## 2022-01-29 PROCEDURE — 1200000002 HC SEMI PRIVATE SWING BED

## 2022-01-29 PROCEDURE — 36415 COLL VENOUS BLD VENIPUNCTURE: CPT

## 2022-01-29 PROCEDURE — 85027 COMPLETE CBC AUTOMATED: CPT

## 2022-01-29 PROCEDURE — 83735 ASSAY OF MAGNESIUM: CPT

## 2022-01-29 PROCEDURE — 6360000002 HC RX W HCPCS: Performed by: PHYSICIAN ASSISTANT

## 2022-01-29 PROCEDURE — 80053 COMPREHEN METABOLIC PANEL: CPT

## 2022-01-29 PROCEDURE — 86480 TB TEST CELL IMMUN MEASURE: CPT

## 2022-01-29 PROCEDURE — 99305 1ST NF CARE MODERATE MDM 35: CPT | Performed by: INTERNAL MEDICINE

## 2022-01-29 PROCEDURE — 6370000000 HC RX 637 (ALT 250 FOR IP): Performed by: INTERNAL MEDICINE

## 2022-01-29 PROCEDURE — 6370000000 HC RX 637 (ALT 250 FOR IP): Performed by: PHYSICIAN ASSISTANT

## 2022-01-29 RX ORDER — M-VIT,TX,IRON,MINS/CALC/FOLIC 27MG-0.4MG
1 TABLET ORAL DAILY
Status: DISCONTINUED | OUTPATIENT
Start: 2022-01-29 | End: 2022-02-14 | Stop reason: HOSPADM

## 2022-01-29 RX ORDER — POTASSIUM CHLORIDE 20 MEQ/1
40 TABLET, EXTENDED RELEASE ORAL EVERY 4 HOURS
Status: COMPLETED | OUTPATIENT
Start: 2022-01-29 | End: 2022-01-29

## 2022-01-29 RX ORDER — DIPHENHYDRAMINE HCL 25 MG
25 TABLET ORAL NIGHTLY PRN
Status: DISCONTINUED | OUTPATIENT
Start: 2022-01-29 | End: 2022-02-03

## 2022-01-29 RX ORDER — AMLODIPINE BESYLATE 5 MG/1
5 TABLET ORAL DAILY
Status: DISCONTINUED | OUTPATIENT
Start: 2022-01-30 | End: 2022-02-04

## 2022-01-29 RX ORDER — ALOGLIPTIN 12.5 MG/1
25 TABLET, FILM COATED ORAL DAILY
Status: DISCONTINUED | OUTPATIENT
Start: 2022-01-29 | End: 2022-02-14 | Stop reason: HOSPADM

## 2022-01-29 RX ORDER — ASCORBIC ACID 500 MG
500 TABLET ORAL DAILY
Status: DISCONTINUED | OUTPATIENT
Start: 2022-01-29 | End: 2022-02-14 | Stop reason: HOSPADM

## 2022-01-29 RX ORDER — BENZONATATE 100 MG/1
100 CAPSULE ORAL 3 TIMES DAILY PRN
Status: DISCONTINUED | OUTPATIENT
Start: 2022-01-29 | End: 2022-02-14 | Stop reason: HOSPADM

## 2022-01-29 RX ORDER — INSULIN GLARGINE 100 [IU]/ML
15 INJECTION, SOLUTION SUBCUTANEOUS NIGHTLY
Status: DISCONTINUED | OUTPATIENT
Start: 2022-01-29 | End: 2022-02-02

## 2022-01-29 RX ORDER — ZINC SULFATE 50(220)MG
50 CAPSULE ORAL DAILY
Status: DISCONTINUED | OUTPATIENT
Start: 2022-01-29 | End: 2022-02-14 | Stop reason: HOSPADM

## 2022-01-29 RX ORDER — AMLODIPINE BESYLATE 5 MG/1
10 TABLET ORAL DAILY
Status: DISCONTINUED | OUTPATIENT
Start: 2022-01-29 | End: 2022-01-29

## 2022-01-29 RX ADMIN — ATORVASTATIN CALCIUM 80 MG: 80 TABLET, FILM COATED ORAL at 21:48

## 2022-01-29 RX ADMIN — Medication 3 UNITS: at 12:18

## 2022-01-29 RX ADMIN — ZINC SULFATE 220 MG (50 MG) CAPSULE 50 MG: CAPSULE at 18:00

## 2022-01-29 RX ADMIN — LISINOPRIL 20 MG: 20 TABLET ORAL at 08:51

## 2022-01-29 RX ADMIN — ASPIRIN 325 MG ORAL TABLET 325 MG: 325 PILL ORAL at 08:51

## 2022-01-29 RX ADMIN — POTASSIUM CHLORIDE 40 MEQ: 20 TABLET, EXTENDED RELEASE ORAL at 12:18

## 2022-01-29 RX ADMIN — Medication 1 TABLET: at 18:00

## 2022-01-29 RX ADMIN — METFORMIN HYDROCHLORIDE 500 MG: 500 TABLET ORAL at 08:52

## 2022-01-29 RX ADMIN — AMLODIPINE BESYLATE 5 MG: 5 TABLET ORAL at 08:30

## 2022-01-29 RX ADMIN — POTASSIUM CHLORIDE 40 MEQ: 20 TABLET, EXTENDED RELEASE ORAL at 08:30

## 2022-01-29 RX ADMIN — METFORMIN HYDROCHLORIDE 500 MG: 500 TABLET ORAL at 18:00

## 2022-01-29 RX ADMIN — Medication 15 UNITS: at 21:47

## 2022-01-29 RX ADMIN — ALOGLIPTIN 25 MG: 12.5 TABLET, FILM COATED ORAL at 18:00

## 2022-01-29 RX ADMIN — OXYCODONE HYDROCHLORIDE AND ACETAMINOPHEN 500 MG: 500 TABLET ORAL at 18:00

## 2022-01-29 RX ADMIN — Medication 2 UNITS: at 08:51

## 2022-01-29 RX ADMIN — DIPHENHYDRAMINE HYDROCHLORIDE 25 MG: 25 TABLET ORAL at 21:48

## 2022-01-29 RX ADMIN — Medication 2 UNITS: at 18:00

## 2022-01-29 RX ADMIN — ENOXAPARIN SODIUM 40 MG: 100 INJECTION SUBCUTANEOUS at 21:48

## 2022-01-29 RX ADMIN — Medication 1 UNITS: at 21:47

## 2022-01-29 NOTE — FLOWSHEET NOTE
Pt resting quietly in bed. Am assessment complete, respirations equal and unlabored. Pt took am medications without difficulty. Pt instructed to call out with any needs or concerns, none at this time. Reviewed plan of care with pt, verbalized understanding. Call bell within pt reach. 01/29/22 0830   Assessment   Charting Type Shift assessment   Neurological   Neuro (WDL) X   Level of Consciousness Alert (0)   Orientation Level Oriented X4   Cognition Appropriate judgement; Appropriate safety awareness   Language Clear;Paraphasic errors   Size R Pupil (mm) 3   R Pupil Shape Round   R Pupil Reaction Brisk   Size L Pupil (mm) 3   L Pupil Shape Round   L Pupil Reaction Brisk   R Hand  Absent   L Hand  Strong   R Foot Dorsiflexion Absent   L Foot Dorsiflexion Strong   R Foot Plantar Flexion Absent   L Foot Plantar Flexion Strong   RUE Motor Response Flaccid   Sensation RUE Full sensation   LUE Motor Response Responds to command;Normal extension;Normal flexion   Sensation LUE Full sensation   RLE Motor Response Flaccid   Sensation RLE Full sensation   LLE Motor Response Responds to command   Sensation LLE Full sensation   [place] Coma Scale   Eye Opening 4   Best Verbal Response 5   Best Motor Response 6   [place] Coma Scale Score 15   HEENT   HEENT (WDL) WDL   Respiratory   Respiratory (WDL) WDL   Respiratory Pattern Regular   Respiratory Depth Normal   Respiratory Quality/Effort Unlabored   Chest Assessment Chest expansion symmetrical   L Breath Sounds Clear;Diminished   R Breath Sounds Clear;Diminished   Breath Sounds   Right Upper Lobe Clear   Right Middle Lobe Clear   Right Lower Lobe Diminished   Left Upper Lobe Clear   Left Lower Lobe Diminished   Cardiac   Cardiac (WDL) WDL   Gastrointestinal   Abdominal (WDL) WDL   Peripheral Vascular   Peripheral Vascular (WDL) WDL   Edema None   Skin Color/Condition   Skin Color/Condition (WDL) WDL   Skin Integrity   Skin Integrity (WDL) WDL   Musculoskeletal Musculoskeletal (WDL) X   RUE Flaccid   LUE Full movement   RL Extremity Flaccid   LL Extremity Full movement   Genitourinary   Genitourinary (WDL) X   Urethral Catheter 16 fr   Placement Date/Time: 01/28/22 1840   Tube Size (fr): 16 fr  Catheter Balloon Size: 10 mL  Collection Container: Standard  Securement Method: Securing device (Describe)  Urine Returned: (c) Yes   Catheter Indications Urinary retention (acute or chronic), continuous bladder irrigation or bladder outlet obstruction   Urine Color Yellow   Urine Appearance Cloudy   Psychosocial   Psychosocial (WDL) WDL

## 2022-01-29 NOTE — FLOWSHEET NOTE
01/28/22 2035 01/28/22 2040   Vital Signs   Temp 99 °F (37.2 °C)  --    Pulse 105  --    Resp 18  --    BP (!) 187/107 (!) 175/100   BP Location  --  Left upper arm   MAP (mmHg) 124  --      Spoke with Jaci Francois r/t htn, see new prn orders.

## 2022-01-29 NOTE — FLOWSHEET NOTE
Pt states she feels like she \"needs to pee but cant\". Bladder scan complete and 330 ml of urine noted in bladder. #16 F silva placed at this time and 500ml of urine noted in silva bag.

## 2022-01-29 NOTE — FLOWSHEET NOTE
01/29/22 0104   Vital Signs   Temp 98.8 °F (37.1 °C)   Temp Source Oral   Pulse 101   Heart Rate Source Monitor   Resp 16   BP (!) 174/87   BP Location Left upper arm   MAP (mmHg) 111   Oxygen Therapy   SpO2 95 %   O2 Device None (Room air)     Spoke with Jaci Francois regarding blood pressure this AM. Patient was given Hydralazine prn around 2100. Also reported patient's blood glucose trends. Telephone with readback to monitor glucose and blood pressure at this time. No new medication orders.

## 2022-01-29 NOTE — H&P
History and Physical    Patient:  Marquis Crowley. Wei Freed    CHIEF COMPLAINT:    Declining functional status following acute CVA    HISTORY OF PRESENT ILLNESS:   The patient is a 60 y.o. female with past medical history of newly diagnosed type 2 diabetes and hypertension who presents due to declining functional status following acute stroke. Patient also tested positive for COVID 19 around 1/21. Patient presented to [facility] on 1/26 due to right-sided weakness and dysarthria. At that time the blood pressure was in the 230s and her A1c was found to be 12.3. An MRI of the brain demonstrated findings concerning for an acute left basal ganglia CVA. She is admitted to the ICU on a Cardene drip for her blood pressure. She was later weaned off of the drip and started on oral medications. Transition to this facility for subacute rehabilitation in setting of right-sided hemiparesis. Of note she did have an echocardiogram with findings of left ventricular hypertrophy consistent with uncontrolled hypertension. She was seen by speech therapy and cleared for regular diet. At time of exam today she is sitting in bed. She appears comfortable. No acute distress. She denies cough or shortness of breath. She is answering questions appropriately with mild dysarthria noted during conversation. Per nursing staff patient reported she was unable to urinate and having pain. Rasmussen catheter was placed with 500 cc return. Per family she is having memory issues since the stroke as well. Past Medical History:      Diagnosis Date    Diabetes mellitus type 2 with complications ([facility] [address]     History of cerebrovascular accident (CVA) with residual deficit     HTN (hypertension)        Past Surgical History:  History reviewed. No pertinent surgical history. Medications Prior to Admission:    Prior to Admission medications    Medication Sig Start Date End Date Taking?  Authorizing Provider   amLODIPine (NORVASC) 5 MG tablet Take 5 mg by mouth daily   Yes Historical Provider, MD   aspirin 325 MG tablet Take 325 mg by mouth daily   Yes Historical Provider, MD   atorvastatin (LIPITOR) 80 MG tablet Take 80 mg by mouth at bedtime   Yes Historical Provider, MD   insulin aspart (NOVOLOG) 100 UNIT/ML injection vial Inject 0-9 units into the skin as directed 3 times daily before meals   Yes Historical Provider, MD   insulin detemir (LEVEMIR) 100 UNIT/ML injection vial Inject 10 Units into the skin nightly   Yes Historical Provider, MD   lisinopril (PRINIVIL;ZESTRIL) 20 MG tablet Take 20 mg by mouth daily   Yes Historical Provider, MD   metFORMIN (GLUCOPHAGE) 500 MG tablet Take 500 mg by mouth 2 times daily (with meals)   Yes Historical Provider, MD       Allergies:  Patient has no known allergies. Social History:   TOBACCO:   reports that she has never smoked. She has never used smokeless tobacco.  ETOH:   has no history on file for alcohol use. OCCUPATION:  None      Family History:   No family history on file. Review of system  Constitutional:  Denies fever or chills. Positive for declining functional status. Eyes:  Denies eye pain or redness  HENT:  Denies nasal congestion or sore throat. Positive for right-sided facial droop. Respiratory:  Denies cough or shortness of breath   Cardiovascular:  Denies chest pain or edema   GI:  Denies abdominal pain, nausea, vomiting, bloody stools or diarrhea   :  Denies dysuria or frequency. Positive for urinary retention. Musculoskeletal:  Denies acute neck pain or body aches  Integument:  Denies rash or itching  Neurologic:  Denies headache, dizziness, numbness, tingling. Positive for right-sided facial droop, dysarthria, right-sided hemiparesis.    Psychiatric:  Denies acute depression or acute anxiety      Vitals:    01/29/22 0958   BP: 136/83   Pulse:    Resp:    Temp:    SpO2:    Reviewed vital signs and electronic chart    Physical exam  Constitutional:  Well developed, well nourished, no acute distress  Eyes:  PERRL, no scleral icterus, conjunctiva normal   HENT:  Atraumatic, external ears normal, nose normal, oropharynx moist, no pharyngeal exudates. Neck- supple, no JVD, no lymphadenopathy  Respiratory:  No respiratory distress on room air, no wheezing, rales or rhonchi detected  Cardiovascular:  Normal rate, normal rhythm, no murmurs, no gallops, no rubs, trace edema RLE    GI:  Soft, nondistended, normal bowel sounds, nontender, no voluntary guarding  Musculoskeletal:  No cyanosis or obvious acute deformity. Moving all extremities   Integument:  Warm and dry. Neurologic:  Alert & oriented x 3, right sided facial droop, right sided hemiparesis, mild dysarthria   Psychiatric:  Speech and behavior appropriate       Lab Results   Component Value Date     01/29/2022    K 2.9 (LL) 01/29/2022     01/29/2022    CO2 21 01/29/2022    BUN 17 01/29/2022    CREATININE <0.5 01/29/2022    GLUCOSE 261 (H) 01/29/2022    CALCIUM 9.2 01/29/2022    PROT 6.4 01/29/2022    LABALBU 3.5 01/29/2022    BILITOT 0.6 01/29/2022    ALKPHOS 103 (H) 01/29/2022    AST 16 01/29/2022    ALT 20 01/29/2022    LABGLOM >60 01/29/2022    GFRAA >59 01/29/2022    AGRATIO 1.2 01/29/2022    GLOB 2.9 01/29/2022           Lab Results   Component Value Date    WBC 7.3 01/29/2022    HGB 16.0 01/29/2022    HCT 45.9 01/29/2022    MCV 90.5 01/29/2022     01/29/2022         Assessment and Plan     Active Hospital Problems    Diagnosis Date Noted    Acute CVA (cerebrovascular accident) ([facility] [address] [I63.9]  -In setting of uncontrolled hypertension  - CTA head and neck without acute finding  -MRI at outside facility with acute left basal ganglia stroke  -Consulted neurology at outside facility and she was started on full dose aspirin, Lipitor 80 mg, and blood pressure control  -Follow-up with neurology as outpatient  -PT/OT consulted  - with RLE swelling and decreased mobility could consider osmar duplex and/or d dimer. Ddimer within normal limits at 0. .29 at outside facility on 1/27 01/29/2022    Uncontrolled hypertension [I10]  -On arrival to outside facility systolic in the 082B  -She was started on lisinopril 20 mg daily and amlodipine 5 mg daily  -Continue this regimen with p.o. hydralazine as needed. Titrate as needed. 01/29/2022    Type 2 diabetes mellitus (HCC) [E11.9]  -New diagnosis for patient  -A1c 12.3  -Metformin 500 mg twice daily  -Add Tradjenta on 1/29  -Titrate Lantus to 15 units nightly on 129 and continue low-dose sliding scale coverage as needed  -Tabetic diet 01/29/2022    Hypokalemia [E87.6]  -Monitor and replace as needed 01/29/2022    COVID-19 [U07.1]  -Tested positive around 1/21  -O2 sat stable on room air  -Continue multivitamin, zinc, vitamin C  -droplet plus precautions 01/29/2022    Declining functional status [R53.81]  -In setting of recent acute CVA with deficits including dysarthria, right-sided hemiparesis  -PT/OT consult  -Speech therapy was consulted at outside facility and recommended regular diet.  -Case management consulted for discharge planning assistance 01/28/2022       Patient was seen and examined with Dr. Bravo Zacarias. After reviewing patient data and diagnostic testing the plan of care was established in conjunction with Dr. Bravo Zacarias.       Electronically signed by ALEJANDRO Castillo on 1/29/2022 at 2:34 PM

## 2022-01-30 LAB
ANION GAP SERPL CALCULATED.3IONS-SCNC: 12 MMOL/L (ref 3–16)
BUN BLDV-MCNC: 21 MG/DL (ref 6–20)
CALCIUM SERPL-MCNC: 9.1 MG/DL (ref 8.5–10.5)
CHLORIDE BLD-SCNC: 103 MMOL/L (ref 98–107)
CO2: 21 MMOL/L (ref 20–30)
CREAT SERPL-MCNC: 0.6 MG/DL (ref 0.4–1.2)
GFR AFRICAN AMERICAN: >59
GFR NON-AFRICAN AMERICAN: >60
GLUCOSE BLD-MCNC: 146 MG/DL (ref 74–106)
GLUCOSE BLD-MCNC: 153 MG/DL (ref 74–106)
GLUCOSE BLD-MCNC: 179 MG/DL (ref 74–106)
GLUCOSE BLD-MCNC: 186 MG/DL (ref 74–106)
GLUCOSE BLD-MCNC: 199 MG/DL (ref 74–106)
MAGNESIUM: 1.7 MG/DL (ref 1.7–2.4)
PERFORMED ON: ABNORMAL
POTASSIUM SERPL-SCNC: 3.5 MMOL/L (ref 3.4–5.1)
SODIUM BLD-SCNC: 136 MMOL/L (ref 136–145)

## 2022-01-30 PROCEDURE — 83735 ASSAY OF MAGNESIUM: CPT

## 2022-01-30 PROCEDURE — 36415 COLL VENOUS BLD VENIPUNCTURE: CPT

## 2022-01-30 PROCEDURE — 6360000002 HC RX W HCPCS: Performed by: PHYSICIAN ASSISTANT

## 2022-01-30 PROCEDURE — 6370000000 HC RX 637 (ALT 250 FOR IP): Performed by: PHYSICIAN ASSISTANT

## 2022-01-30 PROCEDURE — 1200000002 HC SEMI PRIVATE SWING BED

## 2022-01-30 PROCEDURE — 80048 BASIC METABOLIC PNL TOTAL CA: CPT

## 2022-01-30 RX ADMIN — Medication 1 UNITS: at 12:12

## 2022-01-30 RX ADMIN — METFORMIN HYDROCHLORIDE 500 MG: 500 TABLET ORAL at 17:24

## 2022-01-30 RX ADMIN — ENOXAPARIN SODIUM 40 MG: 100 INJECTION SUBCUTANEOUS at 21:37

## 2022-01-30 RX ADMIN — LISINOPRIL 20 MG: 20 TABLET ORAL at 09:44

## 2022-01-30 RX ADMIN — ATORVASTATIN CALCIUM 80 MG: 80 TABLET, FILM COATED ORAL at 21:38

## 2022-01-30 RX ADMIN — ALOGLIPTIN 25 MG: 12.5 TABLET, FILM COATED ORAL at 09:43

## 2022-01-30 RX ADMIN — Medication 1 UNITS: at 21:28

## 2022-01-30 RX ADMIN — Medication 1 UNITS: at 09:44

## 2022-01-30 RX ADMIN — Medication 1 TABLET: at 09:43

## 2022-01-30 RX ADMIN — AMLODIPINE BESYLATE 5 MG: 5 TABLET ORAL at 09:43

## 2022-01-30 RX ADMIN — OXYCODONE HYDROCHLORIDE AND ACETAMINOPHEN 500 MG: 500 TABLET ORAL at 09:44

## 2022-01-30 RX ADMIN — DIPHENHYDRAMINE HYDROCHLORIDE 25 MG: 25 TABLET ORAL at 21:38

## 2022-01-30 RX ADMIN — ASPIRIN 325 MG ORAL TABLET 325 MG: 325 PILL ORAL at 09:43

## 2022-01-30 RX ADMIN — METFORMIN HYDROCHLORIDE 500 MG: 500 TABLET ORAL at 09:43

## 2022-01-30 RX ADMIN — ZINC SULFATE 220 MG (50 MG) CAPSULE 50 MG: CAPSULE at 09:43

## 2022-01-30 RX ADMIN — Medication 1 UNITS: at 17:43

## 2022-01-30 RX ADMIN — Medication 15 UNITS: at 21:27

## 2022-01-30 NOTE — FLOWSHEET NOTE
01/29/22 1002   Assessment   Charting Type Shift assessment   Neurological   Neuro (WDL) X   Level of Consciousness Alert (0)   Orientation Level Oriented X4   Cognition Appropriate judgement; Appropriate safety awareness   Language Clear;Paraphasic errors   Size R Pupil (mm) 3   R Pupil Shape Round   R Pupil Reaction Brisk   Size L Pupil (mm) 3   L Pupil Shape Round   L Pupil Reaction Brisk   R Hand  Absent   L Hand  Strong   R Foot Dorsiflexion Absent   L Foot Dorsiflexion Strong   R Foot Plantar Flexion Absent   L Foot Plantar Flexion Strong   RUE Motor Response Flaccid   Sensation RUE Full sensation   LUE Motor Response Responds to command;Normal extension;Normal flexion   Sensation LUE Full sensation   RLE Motor Response Flaccid   Sensation RLE Full sensation   LLE Motor Response Responds to command   Sensation LLE Full sensation   [place] Coma Scale   Eye Opening 4   Best Verbal Response 5   Best Motor Response 6   [place] Coma Scale Score 15   HEENT   HEENT (WDL) WDL   Respiratory   Respiratory (WDL) WDL   Respiratory Pattern Regular   Respiratory Depth Normal   Respiratory Quality/Effort Unlabored   Chest Assessment Chest expansion symmetrical   L Breath Sounds Clear;Diminished   R Breath Sounds Clear;Diminished   Breath Sounds   Right Upper Lobe Clear   Right Middle Lobe Clear   Right Lower Lobe Diminished   Left Upper Lobe Clear   Left Lower Lobe Diminished   Cardiac   Cardiac (WDL) WDL   Cardiac Monitor   Telemetry Monitor On No   Gastrointestinal   Abdominal (WDL) WDL   Peripheral Vascular   Peripheral Vascular (WDL) WDL   Edema None   Skin Color/Condition   Skin Color/Condition (WDL) WDL   Skin Integrity   Skin Integrity (WDL) WDL   Musculoskeletal   Musculoskeletal (WDL) X   RUE Flaccid   LUE Full movement   RL Extremity Flaccid   LL Extremity Full movement   Genitourinary   Genitourinary (WDL) X   Urethral Catheter 16 fr   Placement Date/Time: 01/28/22 1840   Tube Size (fr): 16 fr  Catheter Balloon Size: 10 mL  Collection Container: Standard  Securement Method: Securing device (Describe)  Urine Returned: (c) Yes   Catheter Indications Urinary retention (acute or chronic), continuous bladder irrigation or bladder outlet obstruction   Psychosocial   Psychosocial (WDL) WDL

## 2022-01-30 NOTE — PROGRESS NOTES
Patient requesting something for sleep, on call provider notified, patient takes tylenol pm at home per son, benadryl would be okay for her to take per son info, new order for benadryl 25mg po qhs prn, also received order for tessalon pearls 100mg po q 6hrs prn for cough, orders entered

## 2022-01-31 ENCOUNTER — APPOINTMENT (OUTPATIENT)
Dept: GENERAL RADIOLOGY | Facility: HOSPITAL | Age: 57
DRG: 056 | End: 2022-01-31
Attending: INTERNAL MEDICINE
Payer: COMMERCIAL

## 2022-01-31 ENCOUNTER — APPOINTMENT (OUTPATIENT)
Dept: ULTRASOUND IMAGING | Facility: HOSPITAL | Age: 57
DRG: 056 | End: 2022-01-31
Attending: INTERNAL MEDICINE
Payer: COMMERCIAL

## 2022-01-31 LAB
A/G RATIO: 1 (ref 0.8–2)
ALBUMIN SERPL-MCNC: 3.2 G/DL (ref 3.4–4.8)
ALP BLD-CCNC: 96 U/L (ref 25–100)
ALT SERPL-CCNC: 24 U/L (ref 4–36)
ANION GAP SERPL CALCULATED.3IONS-SCNC: 12 MMOL/L (ref 3–16)
AST SERPL-CCNC: 27 U/L (ref 8–33)
BILIRUB SERPL-MCNC: 0.6 MG/DL (ref 0.3–1.2)
BUN BLDV-MCNC: 20 MG/DL (ref 6–20)
CALCIUM SERPL-MCNC: 9.1 MG/DL (ref 8.5–10.5)
CHLORIDE BLD-SCNC: 103 MMOL/L (ref 98–107)
CO2: 22 MMOL/L (ref 20–30)
CREAT SERPL-MCNC: <0.5 MG/DL (ref 0.4–1.2)
D DIMER: 0.65 UG/ML FEU (ref 0–0.6)
FOLATE: 13.52 NG/ML
GFR AFRICAN AMERICAN: >59
GFR NON-AFRICAN AMERICAN: >60
GLOBULIN: 3.1 G/DL
GLUCOSE BLD-MCNC: 100 MG/DL (ref 74–106)
GLUCOSE BLD-MCNC: 105 MG/DL (ref 74–106)
GLUCOSE BLD-MCNC: 125 MG/DL (ref 74–106)
GLUCOSE BLD-MCNC: 132 MG/DL (ref 74–106)
GLUCOSE BLD-MCNC: 140 MG/DL (ref 74–106)
HCT VFR BLD CALC: 47.1 % (ref 37–47)
HEMOGLOBIN: 16.1 G/DL (ref 11.5–16.5)
MCH RBC QN AUTO: 31.3 PG (ref 27–32)
MCHC RBC AUTO-ENTMCNC: 34.2 G/DL (ref 31–35)
MCV RBC AUTO: 91.5 FL (ref 80–100)
PDW BLD-RTO: 11.7 % (ref 11–16)
PERFORMED ON: ABNORMAL
PERFORMED ON: NORMAL
PLATELET # BLD: 239 K/UL (ref 150–400)
PMV BLD AUTO: 10.2 FL (ref 6–10)
POTASSIUM SERPL-SCNC: 3.4 MMOL/L (ref 3.4–5.1)
RBC # BLD: 5.15 M/UL (ref 3.8–5.8)
SODIUM BLD-SCNC: 137 MMOL/L (ref 136–145)
TOTAL PROTEIN: 6.3 G/DL (ref 6.4–8.3)
VITAMIN B-12: 510 PG/ML (ref 211–911)
VITAMIN D 25-HYDROXY: 32 (ref 32–100)
WBC # BLD: 8.1 K/UL (ref 4–11)

## 2022-01-31 PROCEDURE — 97802 MEDICAL NUTRITION INDIV IN: CPT

## 2022-01-31 PROCEDURE — 82607 VITAMIN B-12: CPT

## 2022-01-31 PROCEDURE — 80053 COMPREHEN METABOLIC PANEL: CPT

## 2022-01-31 PROCEDURE — 97530 THERAPEUTIC ACTIVITIES: CPT

## 2022-01-31 PROCEDURE — 85027 COMPLETE CBC AUTOMATED: CPT

## 2022-01-31 PROCEDURE — 71045 X-RAY EXAM CHEST 1 VIEW: CPT

## 2022-01-31 PROCEDURE — 6370000000 HC RX 637 (ALT 250 FOR IP): Performed by: PHYSICIAN ASSISTANT

## 2022-01-31 PROCEDURE — 85379 FIBRIN DEGRADATION QUANT: CPT

## 2022-01-31 PROCEDURE — 92523 SPEECH SOUND LANG COMPREHEN: CPT

## 2022-01-31 PROCEDURE — 92610 EVALUATE SWALLOWING FUNCTION: CPT

## 2022-01-31 PROCEDURE — 6360000002 HC RX W HCPCS: Performed by: PHYSICIAN ASSISTANT

## 2022-01-31 PROCEDURE — 97110 THERAPEUTIC EXERCISES: CPT

## 2022-01-31 PROCEDURE — 97162 PT EVAL MOD COMPLEX 30 MIN: CPT

## 2022-01-31 PROCEDURE — 36415 COLL VENOUS BLD VENIPUNCTURE: CPT

## 2022-01-31 PROCEDURE — 82746 ASSAY OF FOLIC ACID SERUM: CPT

## 2022-01-31 PROCEDURE — 1200000002 HC SEMI PRIVATE SWING BED

## 2022-01-31 PROCEDURE — 93970 EXTREMITY STUDY: CPT

## 2022-01-31 PROCEDURE — 82306 VITAMIN D 25 HYDROXY: CPT

## 2022-01-31 PROCEDURE — 97166 OT EVAL MOD COMPLEX 45 MIN: CPT

## 2022-01-31 RX ORDER — ERGOCALCIFEROL 1.25 MG/1
50000 CAPSULE ORAL WEEKLY
Status: DISCONTINUED | OUTPATIENT
Start: 2022-01-31 | End: 2022-02-14 | Stop reason: HOSPADM

## 2022-01-31 RX ORDER — POTASSIUM CHLORIDE 750 MG/1
40 CAPSULE, EXTENDED RELEASE ORAL ONCE
Status: COMPLETED | OUTPATIENT
Start: 2022-01-31 | End: 2022-01-31

## 2022-01-31 RX ADMIN — DIPHENHYDRAMINE HYDROCHLORIDE 25 MG: 25 TABLET ORAL at 22:14

## 2022-01-31 RX ADMIN — ASPIRIN 325 MG ORAL TABLET 325 MG: 325 PILL ORAL at 08:44

## 2022-01-31 RX ADMIN — ATORVASTATIN CALCIUM 80 MG: 80 TABLET, FILM COATED ORAL at 20:10

## 2022-01-31 RX ADMIN — Medication 1 TABLET: at 08:45

## 2022-01-31 RX ADMIN — ENOXAPARIN SODIUM 40 MG: 100 INJECTION SUBCUTANEOUS at 20:11

## 2022-01-31 RX ADMIN — Medication 15 UNITS: at 20:55

## 2022-01-31 RX ADMIN — OXYCODONE HYDROCHLORIDE AND ACETAMINOPHEN 500 MG: 500 TABLET ORAL at 08:45

## 2022-01-31 RX ADMIN — ALOGLIPTIN 25 MG: 12.5 TABLET, FILM COATED ORAL at 08:45

## 2022-01-31 RX ADMIN — LISINOPRIL 20 MG: 20 TABLET ORAL at 08:45

## 2022-01-31 RX ADMIN — METFORMIN HYDROCHLORIDE 500 MG: 500 TABLET ORAL at 08:45

## 2022-01-31 RX ADMIN — ZINC SULFATE 220 MG (50 MG) CAPSULE 50 MG: CAPSULE at 08:45

## 2022-01-31 RX ADMIN — POTASSIUM CHLORIDE 40 MEQ: 10 CAPSULE, COATED, EXTENDED RELEASE ORAL at 09:31

## 2022-01-31 RX ADMIN — AMLODIPINE BESYLATE 5 MG: 5 TABLET ORAL at 08:44

## 2022-01-31 RX ADMIN — METFORMIN HYDROCHLORIDE 500 MG: 500 TABLET ORAL at 17:18

## 2022-01-31 RX ADMIN — ERGOCALCIFEROL 50000 UNITS: 1.25 CAPSULE ORAL at 17:18

## 2022-01-31 ASSESSMENT — PAIN SCALES - GENERAL
PAINLEVEL_OUTOF10: 0

## 2022-01-31 NOTE — CARE COORDINATION
01/31/22 0853   Discharge Planning   Current Residence Private Residence   Living Arrangements Spouse/Significant Other   Support Systems Spouse/Significant Other;Family Members   Current Services Prior To Admission None   Potential Assistance Needed Durable Medical Equipment;Home Care   Potential Assistance Purchasing Medications No   DME   (per therapy)   Type of Home Care Services OT;PT;Nursing Services   Patient expects to be discharged to: [facility]   Expected Discharge Date 02/25/22   Follow Up Appointment: Best Day/Time    (any)     Lives with SO. No DME at baseline. Will need HH and DME pert therapy recs.

## 2022-01-31 NOTE — FLOWSHEET NOTE
01/31/22 0850   Assessment   Charting Type Shift assessment   Neurological   Neuro (WDL) X   Level of Consciousness Alert (0)   Cognition Appropriate judgement   Language Clear;Paraphasic errors   Size R Pupil (mm) 3   R Pupil Shape Round   Size L Pupil (mm) 3   L Pupil Shape Round   R Hand  Absent   L Hand  Strong   R Foot Dorsiflexion Absent   L Foot Dorsiflexion Strong   R Foot Plantar Flexion Absent   L Foot Plantar Flexion Strong   RUE Motor Response Flaccid   Sensation RUE Decreased   LUE Motor Response Responds to command;Normal extension;Normal flexion   Sensation LUE Full sensation   RLE Motor Response Flaccid   Sensation RLE Full sensation   LLE Motor Response Responds to command   Sensation LLE Full sensation   Gag Present   Tongue Deviation None   NIHSS Stroke Scale Assessed No   Swallow Screening   Is the patient able to remain alert for testing? Yes   Was the Patient Eating a Modified Diet Prior to being Admitted?  No   [place] Coma Scale   Eye Opening 4   Best Verbal Response 5   Best Motor Response 6   [place] Coma Scale Score 15   HEENT   HEENT (WDL) WDL   Respiratory   Respiratory Pattern Regular   Respiratory Depth Normal   Respiratory Quality/Effort Unlabored   Chest Assessment Chest expansion symmetrical   L Breath Sounds Clear   R Breath Sounds Clear   Breath Sounds   Right Upper Lobe Clear   Right Middle Lobe Clear   Right Lower Lobe Diminished   Left Upper Lobe Clear   Left Lower Lobe Diminished   Cardiac   Cardiac (WDL) WDL   Cardiac Monitor   Telemetry Monitor On Yes   Gastrointestinal   Abdominal (WDL) WDL   Peripheral Vascular   Peripheral Vascular (WDL) WDL   Edema None   Skin Color/Condition   Skin Color/Condition (WDL) WDL   Musculoskeletal   Musculoskeletal (WDL) X   RUE Flaccid   LUE Full movement   RL Extremity Flaccid   LL Extremity Full movement   Genitourinary   Genitourinary (WDL) X  (catheter)   Anus/Rectum   Anus/Rectum (WDL) WDL   Urethral Catheter 16 fr   Placement Date/Time: 01/28/22 1840   Tube Size (fr): 16 fr  Catheter Balloon Size: 10 mL  Collection Container: Standard  Securement Method: Securing device (Describe)  Urine Returned: (c) Yes   Catheter Indications Urinary retention (acute or chronic), continuous bladder irrigation or bladder outlet obstruction   Urine Color Yellow   Urine Appearance Cloudy   Psychosocial   Psychosocial (WDL) WDL   Pt alert this am -Pt able to take am medications with assist whole- Am assessment completed- Breathing equal and unlabored- Right arm elevated on pillow right leg in heel protector- Called speech this am for speech evaluation [id number]- No complaints of pain- Call bell within reach- Pt told to call out with any needs will monitor

## 2022-01-31 NOTE — PLAN OF CARE
Problem: Mobility - Impaired:  Goal: Mobility will improve  Description: Mobility will improve  Outcome: Ongoing     Problem: Skin Integrity:  Goal: Will show no infection signs and symptoms  Description: Will show no infection signs and symptoms  1/31/2022 0021 by Rowena Nguyen RN  Outcome: Ongoing

## 2022-01-31 NOTE — FLOWSHEET NOTE
01/30/22 8135   Assessment   Charting Type Shift assessment   Neurological   Neuro (WDL) X   Level of Consciousness Alert (0)   Cognition Appropriate judgement   Language Clear;Paraphasic errors   RUE Motor Response Flaccid   RLE Motor Response Flaccid   Sensation RLE Full sensation   LLE Motor Response Responds to command   Sensation LLE Full sensation   [place] Coma Scale   Eye Opening 4   Best Verbal Response 5   Best Motor Response 6   [place] Coma Scale Score 15   HEENT   HEENT (WDL) WDL   Respiratory   Respiratory Pattern Regular   Respiratory Depth Normal   Respiratory Quality/Effort Unlabored   Chest Assessment Chest expansion symmetrical   L Breath Sounds Clear   R Breath Sounds Clear   Breath Sounds   Right Upper Lobe Clear   Right Lower Lobe Diminished   Left Upper Lobe Clear   Left Lower Lobe Diminished   Cardiac   Cardiac (WDL) WDL   Cardiac Monitor   Telemetry Monitor On Yes   Gastrointestinal   Abdominal (WDL) WDL   Peripheral Vascular   Peripheral Vascular (WDL) WDL   Edema None   Skin Color/Condition   Skin Color/Condition (WDL) WDL   Musculoskeletal   Musculoskeletal (WDL) X   RUE Flaccid   LUE Full movement   RL Extremity Flaccid   LL Extremity Full movement   Genitourinary   Genitourinary (WDL) X   Upon assessment pt. Found to be awake and alert,very pleasant. Meds given per MD orders. Pt. incont. of BM-med. brown in color,cleaned and changed. Stool was liquid. Pt. unable to move RUE and RLE.

## 2022-01-31 NOTE — PROGRESS NOTES
Occupational Therapy  Daily Note  Date: 2022   Patient Name: Omayra Dang  MRN: [id number]     : 1965    Date of Service: 2022  Discharge Recommendations:  Continue to assess pending progress    OT Equipment Recommendations: Will assess needs closer to DC pending progress: Att, pt will need WC, BSC, Bed rail    Assessment  Performance deficits / Impairments: Decreased functional mobility ; Decreased ADL status; Decreased ROM; Decreased strength;Decreased endurance;Decreased balance;Decreased coordination;Decreased posture;Decreased high-level IADLs;Decreased fine motor control;Decreased vision/visual deficit    Pt seen for skilled OT interventions; no pain reported; co tx w PTA. Pt able to perform bed mobility w Min A x2 w Mod A for scooting. With sitting EOB, pt required CGA due to balance deficits w verbal cues to utilize unaffected side for upright balance support. Pt able to perform sit to stands 3x w Mod A x2. During tasks, pt presents w R sided hemiplegia; OT provided pt education about neglect techniques to use. Pt tolerated tx well w increased fatigue during activity. Pt required assist to comb hair w cues to comb R side. No volitional movement in RUE noted this date. OT completed PROM & provided education about self PROM to complete. RUE elevated & positioned on pillow, boot placed on RLE foot. Pt supine in bed, head elevated, call bell in reach, all needs met. Patient Diagnosis(es): There were no encounter diagnoses. has a past medical history of Diabetes mellitus type 2 with complications ([address] History of cerebrovascular accident (CVA) with residual deficit, and HTN (hypertension). has no past surgical history on file.      Restrictions  Restrictions/Precautions: General Precautions,Isolation,Fall Risk,Contact Precautions  Required Braces or Orthoses?: No    Subjective  Chart Reviewed: Yes  Patient assessed for rehabilitation services?: Yes  Family / Caregiver Present: No  Referring Practitioner: Brandon Cooney PA-C  Diagnosis: Declining functional status    Subjective: Pt agreeable to OT services. Pt reports no pain. Patient Currently in Pain: Denies    Objective   Hearing: Within functional limits    Overall Orientation Status: Impaired  Orientation Level: Oriented to person;Oriented to situation;Disoriented to time;Oriented to place  Observation: Pt lying in bed, NAD, RA, Pleasant & cooperative    Posture: Fair  Sitting Balance: Contact guard assistance  Bed mobility: Minimal assistance;2 Person assistance  Scooting: Moderate assistance;2 Person assistance  Sit to Stand: Moderate Assistance;2 Person Assistance    Cognition: Pt able to follow one step commands, difficulty with multi step commands, STM intact, expressive aphasia    RUE PROM x15  Shoulder flexion/extension  Shoulder add/abduction  Horizontal add/abduction  Internal/external rotation  Elbow flexion/extension  Forearm supination/pronation  Wrist flexion/extension     Plan  Times per week: 3-5  Times per day: Daily  Plan weeks: 2-4  Current Treatment Recommendations: Strengthening,ROM,Balance Training,Functional Mobility Training,Endurance Training,Equipment Evaluation, Education, & procurement,Neuromuscular Re-education,Self-Care / ADL,Patient/Caregiver Education & Training,Safety Education & Training,Positioning    Goals  Short term goals  Time Frame for Short term goals: 2 weeks  Short term goal 1: pt to complete bed mobility with MOD I. Short term goal 2: pt to complete sliding board transfer with min A. Short term goal 3: pt to complete UB dressing with CGA and LB dressing with min A. Short term goal 4: pt to complete hygiene/grooming with HERNANDEZ seated at EOB with no VC. Short term goal 5: Pt to tolerate sitting at EOB x15 minutes with SBA. Long term goals  Time Frame for Long term goals : 4 weeks  Long term goal 1: pt to complete sliding board transfers with HERNANDEZ.   Long term goal 2: Pt to self propel x100 feet with SUP. Long term goal 3: Pt to complete UB dressing with HERNANDEZ and LB dressing with SBA  Long term goal 4: Pt to complete bathing with min A. Long term goal 5: Pt to demo ability to complete visual scanning with no verbal cuing. Therapy Time   Individual Co-treatment   Time In  6155   Time Out  7123   Minutes  57     This note serves as a DC summary in the event of pt discharge.    Indira Elliott, OTR/L

## 2022-01-31 NOTE — CONSULTS
Comprehensive Nutrition Assessment    Type and Reason for Visit:  Initial,Consult,Patient Education    Nutrition Recommendations/Plan: Recommend to continue with current diet and will start ONS TID. Encourage intakes as appropriate. Will follow up with edu needs before patient discharged. Nutrition Assessment:  Pt admitted with declining functional status, recent COVID, CVA and newly dx with T2D. Pt is at moderate risk for ongoing nutritional compromise r/t poor intakes. Has MVI, Nz, Vit C ordered. Will start ONS TID until intakes improve. Malnutrition Assessment:  Malnutrition Status:  Insufficient data    Context:  Acute Illness     Findings of the 6 clinical characteristics of malnutrition:  Energy Intake:  Unable to assess  Weight Loss:  No significant weight loss     Body Fat Loss:  No significant body fat loss     Muscle Mass Loss:  Unable to assess    Fluid Accumulation:  No significant fluid accumulation     Strength:       Estimated Daily Nutrient Needs:  Energy (kcal):  7480-2488 (22-24 kcal/kg cbw); Weight Used for Energy Requirements:  Current     Protein (g):  71 (1.3 gm/kg IBW); Weight Used for Protein Requirements:  Ideal        Fluid (ml/day):  9393-9269; Method Used for Fluid Requirements:  1 ml/kcal      Nutrition Related Findings:  Pt presents with COVID-19, CVA and newly dx with T2D. No reported edema or skin issues. No reported swallowing issues per speech. Glu: 100-199. Rt side paralysis. Wounds:  None       Current Nutrition Therapies:    ADULT DIET; Regular; 4 carb choices (60 gm/meal);  Low Fat/Low Chol/High Fiber/JARROD; Low Sodium (2 gm)  ADULT ORAL NUTRITION SUPPLEMENT; Breakfast, Lunch, Dinner; Standard High Calorie/High Protein Oral Supplement    Anthropometric Measures:  · Height: 5' 4\" (162.6 cm)  · Current Body Weight: 147 lb 9.6 oz (67 kg)   · Admission Body Weight:      · Usual Body Weight:       · Ideal Body Weight: 120 lbs; % Ideal Body Weight 123 %   · BMI: 25.3  · Adjusted Body Weight:  ; No Adjustment   · Adjusted BMI:      · BMI Categories: Overweight (BMI 25.0-29. 9)       Nutrition Diagnosis:   · Predicted inadequate energy intake related to biting/chewing (masticatory) difficulty,cognitive or neurological impairment as evidenced by intake 0-25%      Nutrition Interventions:   Food and/or Nutrient Delivery:  Continue Current Diet,Start Oral Nutrition Supplement  Nutrition Education/Counseling:  Education needed (will follow up with edu needs as appropriate.)   Coordination of Nutrition Care:  Continue to monitor while inpatient,Interdisciplinary Rounds    Goals:  to meet est needs for age/condition and provide nutrition counseling for diet       Nutrition Monitoring and Evaluation:   Behavioral-Environmental Outcomes:  Readiness for Change,Knowledge or Skill   Food/Nutrient Intake Outcomes:  Food and Nutrient Intake,Supplement Intake,Vitamin/Mineral Intake  Physical Signs/Symptoms Outcomes:  Biochemical Data,Weight,Chewing or Swallowing     Discharge Planning:    Continue current diet     Electronically signed by Kalina Hamilton MS, RD, LD on 1/31/22 at 3:47 PM EST

## 2022-01-31 NOTE — PROGRESS NOTES
Speech Language Pathology  Facility/Department: [facility] MED SURG  Initial Assessment    NAME: Noreen Dawson  : 1965  MRN: [id number]    Date of Eval: 2022  Evaluating Therapist: Kath Sheets, SLP          Past Medical History: has a past medical history of Diabetes mellitus type 2 with complications ([address] History of cerebrovascular accident (CVA) with residual deficit, and HTN (hypertension). Past Surgical History:  has no past surgical history on file. Primary Complaint: speech disturbance due to recent CVA           Pain:  Pain Assessment  Pain Assessment: 0-10  Pain Level: 0    Assessment:  Speech Diagnosis: other speech disturbance  Diagnosis: Patient presents with decreased strength and ROM of the oral muscle musculature, patient presents with right sided facial flaccid, labial droop to right side, patient tongue deviates to the right impairing patient speech intelligibilty      Subjective:   Previous level of function and limitations: Patient presents with no deficits prior to CVA  General  Chart Reviewed: Yes  Patient assessed for rehabilitation services?: Yes  Additional Pertinent Hx: History of CVA  Family / Caregiver Present: No  General Comment  Comments: Patient seated in upright position in bed  Subjective  Subjective: Patient pleasant and cooperative     Objective:     Oral/Motor  Oral Motor: Exceptions to [facility]  Labial ROM: Reduced right  Labial Symmetry: Abnormal symmetry right  Labial Strength: Reduced  Labial Coordination: Reduced  Lingual ROM: Reduced left (tongue deviates to the right)  Lingual Symmetry: Abnormal symmetry right  Lingual Strength: Reduced  Lingual Coordination: Reduced  Auditory Comprehension  Comprehension: Within Functional Limits     Expression  Primary Mode of Expression: Verbal  Verbal Expression  Verbal Expression: Within functional limits  Convergent: To be assessed in therapy  Divergent:  To be assessed in therapy  Effective Techniques: Oral motor techniques  Written Expression  Dominant Hand: Right  Written Expression: Unable to assess  Motor Speech  Motor Speech: Exceptions to [facility]  Intelligibility: Mild  Dysarthria : Mild  Pragmatics/Social Functioning  Pragmatics: Within functional limits       Cognition  Orientation  Overall Orientation Status: Within Normal Limits  Attention  Attention: Within Functional Limits  Memory  Memory: Within Funtional Limits  Problem Solving  Problem Solving: Within Functional Limits  Safety/Judgement  Safety/Judgement: Within Functional Limits       Plan:    Goals:   Short-term Goals  Timeframe for Short-term Goals: 2 weeks  Goal 1: Patient will peform oral motor exercises for increased speech intelligibiltity 20/20 x  Goal 2: Patient will demonstrate with increased speech intelligibility for increased communication with wants/needs 90% accuracy  Long-term Goals  Timeframe for Long-term Goals: 4 weeks  Goal 1: Patient will demonstrate with increased speech intelligibility for increased communcation with wants/needs 100% accuracy  Speech Therapy Prognosis  Prognosis: Good  Prognosis Considerations: Age,Potential,Participation Level  Duration/Frequency of Treatment  Duration/Frequency of Treatment: 1 - 5 x week x 4 weeks  Recommendations  Requires SLP Intervention: Yes  D/C Recommendations: To be determined  Patient Education: Patient education with established POC  Patient Education Response: Verbalizes understanding  D/C Recommendations:  To be determined  Requires SLP Intervention: Yes  Patient/family involved in developing goals and treatment plan: yes        Follow Up:  Patient to be seen 1 to 5 x week x 4 weeks    FARZANEH Ricardo

## 2022-01-31 NOTE — PROGRESS NOTES
Speech Language Pathology  Facility/Department: [facility] MED SURG   CLINICAL BEDSIDE SWALLOW EVALUATION    NAME: Noreen Banuelos  : 1965  MRN: [id number]    ADMISSION DATE: 2022  ADMITTING DIAGNOSIS: has Declining functional status; Acute [address]A (cerebrovascular accident) ([facility] [address] Uncontrolled hypertension; Type 2 diabetes mellitus ([facility] [address] Hypokalemia; COVID-19; and Urinary retention on their problem list.  ONSET DATE: 22    Recent Chest Xray/CT of Chest: N?A    Date of Eval: 2022  Evaluating Therapist: FARZANEH Snell    Current Diet level:  Current Diet : Regular  Current Liquid Diet : Thin      Primary Complaint  Patient Complaint: N/A    Pain:  Pain Assessment  Pain Assessment: 0-10  Pain Level: 0    Reason for Referral  Noreen Banuelos was referred for a bedside swallow evaluation to assess the efficiency of her swallow function, identify signs and symptoms of aspiration and make recommendations regarding safe dietary consistencies, effective compensatory strategies, and safe eating environment. Impression     Dysphagia Outcome Severity Scale: Level 7: Normal in all situations     Treatment Plan  Requires SLP Intervention: No  Duration/Frequency of Treatment: Dyshagia eval only  D/C Recommendations: To be determined    Recommended Diet and Intervention  Diet Solids Recommendation: Regular  Liquid Consistency Recommendation: Thin  Recommended Form of Meds: PO     Therapeutic Interventions: Oral motor exercises    Compensatory Swallowing Strategies  Compensatory Swallowing Strategies:  Alternate solids and liquids;Upright as possible for all oral intake;Remain upright for 30-45 minutes after meals    Treatment/Goals  Short-term Goals  Timeframe for Short-term Goals: ST eval only  Long-term Goals  Timeframe for Long-term Goals: ST eval only  Goal 1: ST eval only    General  Chart Reviewed: Yes  Comments: Patient seated upright in bed,  Subjective  Subjective: Patient pleasant and cooperative  Behavior/Cognition: Alert; Cooperative;Pleasant mood  Communication Observation: Functional  Follows Directions: Simple  Dentition: Adequate  Patient Positioning: Upright in bed  Baseline Vocal Quality: Normal  Volitional Cough: Strong  Prior Dysphagia History: N/A  Consistencies Administered: Reg solid; Thin - cup; Thin - straw    Vision/Hearing  Vision  Vision: Impaired  Vision Exceptions:  (Patient presents with visual deficits due to recent CVA)  Hearing  Hearing: Within functional limits    Oral Motor Deficits  Oral/Motor  Oral Motor: Exceptions to [facility]  Labial ROM: Reduced right  Labial Symmetry: Abnormal symmetry right  Labial Strength: Reduced  Labial Coordination: Reduced  Lingual ROM: Reduced left (tongue deviates to the right)  Lingual Symmetry: Abnormal symmetry right  Lingual Strength: Reduced  Lingual Coordination: Reduced    Oral Phase Dysfunction    Indicators of Pharyngeal Phase Dysfunction   Pharyngeal Phase  Pharyngeal Phase: WNL  Pharyngeal Phase   Pharyngeal: Patient presents with no signs of aspiration with regular consistency solids and thin liquids    Prognosis  Prognosis  Prognosis for safe diet advancement: excellent  Individuals consulted  Consulted and agree with results and recommendations: Patient    Education  Patient Education: education with aspiration precautions and guidelines  Patient Education Response: Verbalizes understanding  Safety Devices in place: Yes  Type of devices: All fall risk precautions in place; Left in bed;Call light within reach       Therapy Time  SLP Individual Minutes  Time In: 0900  Time Out: 4893  Minutes: [address] [place], [place]  1/31/2022 12:04 PM

## 2022-01-31 NOTE — PROGRESS NOTES
Physical Therapy    Facility/Department: [facility] MED SURG  Initial Assessment - Swing    NAME: Noreen Rebollar  : 1965  MRN: [id number]    Date of Service: 2022    Discharge Recommendations:  Continue to assess pending progress        Assessment   Body structures, Functions, Activity limitations: Decreased functional mobility ; Decreased strength;Decreased high-level IADLs;Decreased balance  Assessment: s/p left CVA with right side paresis, functional decline; will benefit from PT to help improve trunk control, strength, balance, and functional mobility. Treatment Diagnosis: s/p left CVA with right side paresis, functional decline  Prognosis: Fair  Decision Making: Medium Complexity  REQUIRES PT FOLLOW UP: Yes  Activity Tolerance  Activity Tolerance: Patient Tolerated treatment well;Patient limited by fatigue       Patient Diagnosis(es): There were no encounter diagnoses. has a past medical history of Diabetes mellitus type 2 with complications ([facility] [address] History of cerebrovascular accident (CVA) with residual deficit, and HTN (hypertension). has no past surgical history on file. Restrictions  Restrictions/Precautions  Restrictions/Precautions: General Precautions,Isolation,Fall Risk,Contact Precautions  Required Braces or Orthoses?: No  Vision/Hearing  Vision: Impaired  Hearing: Within functional limits     Subjective  General  Chart Reviewed: Yes  Patient assessed for rehabilitation services?: Yes  Response To Previous Treatment: Not applicable  Family / Caregiver Present: No  Referring Practitioner: Joie Campos MD  Diagnosis: s/p left CVA; right side paresis  Follows Commands: Within Functional Limits  Subjective  Subjective: Patient agreeable to consult; was independent prior to current illness; unable to move right UE, LE, but has feeling.   Pain Screening  Patient Currently in Pain: Denies  Vital Signs  Patient Currently in Pain: Denies       Orientation  Orientation  Overall Orientation Status: Training,Patient/Caregiver Education & Training,Manual Therapy - Soft Tissue Mobilization,Manual Therapy - Joint Manipulation,Functional Mobility Training,Transfer Training,ADL/Self-care Training,Balance Training               Goals  Long term goals  Time Frame for Long term goals : 4 weeks  Long term goal 1: Patient to achieve bed mobility with mod assist x 1. Long term goal 2: Patient to transfer supine to sit and sit to supine with mod assist x 1. Long term goal 3: Patient to transfer bed to chair with sliding board with mod assist x 1. Long term goal 4: Patient to achieve Fair + static sitting balance.        Therapy Time   Individual Concurrent Group Co-treatment   Time In           Time Out           Minutes                   Matilda Councilman, PT

## 2022-01-31 NOTE — ACP (ADVANCE CARE PLANNING)
Advance Care Planning     General Advance Care Planning (ACP) Conversation    Date of Conversation: 1/31/22 Conducted with: Chuckie Michaud  In abscence of Danish Martinez Decision Maker:  No healthcare decision makers have been documented. Click here to complete [address] including selection of the Healthcare Decision Maker Relationship (ie \"Primary\")   Today we discussed [address]. The patient is considering options.     Content/Action Overview:  Has NO ACP documents/care preferences - information provided, considering goals and options  Reviewed DNR/DNI and patient elects Full Code (Attempt Resuscitation)      Length of Voluntary ACP Conversation in minutes:  <16 minutes (Non-Billable)    Ramin Luz

## 2022-01-31 NOTE — FLOWSHEET NOTE
01/31/22 0753   Vital Signs   Temp 98.1 °F (36.7 °C)   Temp Source Axillary   Pulse 101   Heart Rate Source Monitor   Resp 18   BP (!) 141/91   BP Location Left upper arm   MAP (mmHg) 107   Oxygen Therapy   SpO2 95 %   O2 Device None (Room air)

## 2022-02-01 ENCOUNTER — APPOINTMENT (OUTPATIENT)
Dept: ULTRASOUND IMAGING | Facility: HOSPITAL | Age: 57
DRG: 056 | End: 2022-02-01
Attending: INTERNAL MEDICINE
Payer: COMMERCIAL

## 2022-02-01 LAB
GLUCOSE BLD-MCNC: 109 MG/DL (ref 74–106)
GLUCOSE BLD-MCNC: 111 MG/DL (ref 74–106)
GLUCOSE BLD-MCNC: 125 MG/DL (ref 74–106)
GLUCOSE BLD-MCNC: 131 MG/DL (ref 74–106)
GLUCOSE BLD-MCNC: 92 MG/DL (ref 74–106)
PERFORMED ON: ABNORMAL
PERFORMED ON: NORMAL
QUANTI TB GOLD PLUS: NEGATIVE
QUANTI TB1 MINUS NIL: 0 IU/ML (ref 0–0.34)
QUANTI TB2 MINUS NIL: 0 IU/ML (ref 0–0.34)
QUANTIFERON MITOGEN: >10 IU/ML
QUANTIFERON NIL: 0.02 IU/ML

## 2022-02-01 PROCEDURE — 2580000003 HC RX 258: Performed by: PHYSICIAN ASSISTANT

## 2022-02-01 PROCEDURE — 97140 MANUAL THERAPY 1/> REGIONS: CPT

## 2022-02-01 PROCEDURE — 97535 SELF CARE MNGMENT TRAINING: CPT

## 2022-02-01 PROCEDURE — 93926 LOWER EXTREMITY STUDY: CPT

## 2022-02-01 PROCEDURE — 1200000002 HC SEMI PRIVATE SWING BED

## 2022-02-01 PROCEDURE — 6370000000 HC RX 637 (ALT 250 FOR IP): Performed by: PHYSICIAN ASSISTANT

## 2022-02-01 PROCEDURE — 97110 THERAPEUTIC EXERCISES: CPT

## 2022-02-01 PROCEDURE — 97530 THERAPEUTIC ACTIVITIES: CPT

## 2022-02-01 PROCEDURE — 6360000002 HC RX W HCPCS: Performed by: PHYSICIAN ASSISTANT

## 2022-02-01 PROCEDURE — 97112 NEUROMUSCULAR REEDUCATION: CPT

## 2022-02-01 RX ORDER — SODIUM CHLORIDE 9 MG/ML
INJECTION, SOLUTION INTRAVENOUS ONCE
Status: COMPLETED | OUTPATIENT
Start: 2022-02-01 | End: 2022-02-01

## 2022-02-01 RX ORDER — PANTOPRAZOLE SODIUM 40 MG/1
40 TABLET, DELAYED RELEASE ORAL
Status: DISCONTINUED | OUTPATIENT
Start: 2022-02-02 | End: 2022-02-14 | Stop reason: HOSPADM

## 2022-02-01 RX ORDER — SODIUM CHLORIDE 9 MG/ML
INJECTION, SOLUTION INTRAVENOUS CONTINUOUS
Status: DISCONTINUED | OUTPATIENT
Start: 2022-02-01 | End: 2022-02-06

## 2022-02-01 RX ADMIN — LISINOPRIL 20 MG: 20 TABLET ORAL at 08:28

## 2022-02-01 RX ADMIN — ASPIRIN 325 MG ORAL TABLET 325 MG: 325 PILL ORAL at 08:26

## 2022-02-01 RX ADMIN — ENOXAPARIN SODIUM 40 MG: 100 INJECTION SUBCUTANEOUS at 19:57

## 2022-02-01 RX ADMIN — AMLODIPINE BESYLATE 5 MG: 5 TABLET ORAL at 08:27

## 2022-02-01 RX ADMIN — HYDRALAZINE HYDROCHLORIDE 25 MG: 25 TABLET, FILM COATED ORAL at 19:58

## 2022-02-01 RX ADMIN — Medication 1 TABLET: at 08:27

## 2022-02-01 RX ADMIN — ZINC SULFATE 220 MG (50 MG) CAPSULE 50 MG: CAPSULE at 08:27

## 2022-02-01 RX ADMIN — ALOGLIPTIN 25 MG: 12.5 TABLET, FILM COATED ORAL at 08:27

## 2022-02-01 RX ADMIN — SODIUM CHLORIDE: 9 INJECTION, SOLUTION INTRAVENOUS at 22:50

## 2022-02-01 RX ADMIN — SODIUM CHLORIDE: 9 INJECTION, SOLUTION INTRAVENOUS at 12:27

## 2022-02-01 RX ADMIN — OXYCODONE HYDROCHLORIDE AND ACETAMINOPHEN 500 MG: 500 TABLET ORAL at 08:27

## 2022-02-01 RX ADMIN — METFORMIN HYDROCHLORIDE 500 MG: 500 TABLET ORAL at 08:27

## 2022-02-01 RX ADMIN — ATORVASTATIN CALCIUM 80 MG: 80 TABLET, FILM COATED ORAL at 19:58

## 2022-02-01 RX ADMIN — DIPHENHYDRAMINE HYDROCHLORIDE 25 MG: 25 TABLET ORAL at 19:57

## 2022-02-01 RX ADMIN — SODIUM CHLORIDE: 9 INJECTION, SOLUTION INTRAVENOUS at 11:02

## 2022-02-01 RX ADMIN — METFORMIN HYDROCHLORIDE 500 MG: 500 TABLET ORAL at 17:00

## 2022-02-01 NOTE — PROGRESS NOTES
Physical Therapy  Facility/Department: [facility] MED SURG  Daily Treatment Note  NAME: Noreen Stanton  : 1965  MRN: [id number]    Date of Service: 2022    Discharge Recommendations:  Continue to assess pending progress      Assessment   Assessment: Patient awake in bed, pleasant and eager to participate with therapy. Cotreated with OT. Patient able to roll to the right with cues and Min A of 2. Transitioned supine to sit with Min A of 2. Provided patient cues and proper hand/foot placement to establish sitting balance at bedside. Patient required Min A to maintain sitting balance during reaching activities that focused on patient looking and reaching toward her R side. Engaged patient in L LE AROM exercises and PROM for R LE. Patient stood at bedside with Mod A of 2 and cues for upright posture. Patient transitioned sit to supine with Mod A of 2. Educated patient for working on looking toward her R side and being aware of placement of her R UE as she exhibits some neglect. REQUIRES PT FOLLOW UP: Yes  Activity Tolerance  Activity Tolerance: Patient Tolerated treatment well     Patient Diagnosis(es): There were no encounter diagnoses. has a past medical history of Diabetes mellitus type 2 with complications ([facility] [address] History of cerebrovascular accident (CVA) with residual deficit, and HTN (hypertension). has no past surgical history on file. Restrictions  Restrictions/Precautions  Restrictions/Precautions: General Precautions,Isolation,Fall Risk,Contact Precautions  Required Braces or Orthoses?: No  Subjective   General  Chart Reviewed: Yes  Response To Previous Treatment: Patient with no complaints from previous session. Family / Caregiver Present: No  Subjective  Subjective: Patient offers no new c/o. States she's ready to work.   Pain Screening  Patient Currently in Pain: Denies  Vital Signs  Patient Currently in Pain: Denies        Objective   Bed mobility  Rolling to Right: Minimal assistance;2 Person assistance  Supine to Sit: Minimal assistance;2 Person assistance  Sit to Supine: Moderate assistance;2 Person assistance  Scooting: Moderate assistance;2 Person assistance  Transfers  Sit to Stand: Moderate Assistance;2 Person Assistance  Stand to sit: Moderate Assistance;2 Person Assistance  Ambulation  Ambulation?: No     Balance  Posture: Fair  Sitting - Static: Fair  Sitting - Dynamic: Fair;-  Exercises  Hip Flexion: 10 LLE  Hip Abduction: 10 LLE  Knee Long Arc Quad: 10 LLE  Ankle Pumps: 10 LLE  Comments: PROM for R LE      Goals  Long term goals  Time Frame for Long term goals : 4 weeks  Long term goal 1: Patient to achieve bed mobility with mod assist x 1. Long term goal 2: Patient to transfer supine to sit and sit to supine with mod assist x 1. Long term goal 3: Patient to transfer bed to chair with sliding board with mod assist x 1. Long term goal 4: Patient to achieve Fair + static sitting balance. Plan    Plan  Times per week: 4-5 x week  Plan weeks: 4 weeks  Current Treatment Recommendations: Strengthening,Neuromuscular Re-education,Home Exercise Program,Safety Education & Training,Patient/Caregiver Education & Training,Manual Therapy - Soft Tissue Mobilization,Manual Therapy - Joint Manipulation,Functional Mobility Training,Transfer Training,ADL/Self-care Training,Balance Training  Safety Devices  Type of devices: Left in bed,Bed alarm in place,Call light within reach     Therapy Time   Individual Concurrent Group Co-treatment   Time In 0066         Time Out 3191         Minutes 57              This note serves as D/C summary if patient is discharged prior to next visit.   Radha Gillette, PTA

## 2022-02-01 NOTE — FLOWSHEET NOTE
02/01/22 0926   Assessment   Charting Type Shift assessment   Neurological   Neuro (WDL) X   Level of Consciousness Alert (0)   Cognition Appropriate judgement   Language Clear;Paraphasic errors   Size R Pupil (mm) 3   R Pupil Shape Round   Size L Pupil (mm) 3   L Pupil Shape Round   R Hand  Absent   L Hand  Strong   R Foot Dorsiflexion Absent   L Foot Dorsiflexion Strong   R Foot Plantar Flexion Absent   L Foot Plantar Flexion Strong   RUE Motor Response Flaccid   Sensation RUE Decreased   LUE Motor Response Responds to command;Normal extension;Normal flexion   Sensation LUE Full sensation   RLE Motor Response Flaccid   Sensation RLE Tingling   LLE Motor Response Responds to command   Sensation LLE Full sensation   Gag Present   [place] Coma Scale   Eye Opening 4   Best Verbal Response 5   Best Motor Response 6   Home Coma Scale Score 15   HEENT   HEENT (WDL) WDL   Respiratory   Respiratory Pattern Regular   Respiratory Depth Normal   Respiratory Quality/Effort Unlabored   Chest Assessment Chest expansion symmetrical   L Breath Sounds Clear   R Breath Sounds Clear   Breath Sounds   Right Upper Lobe Clear   Right Middle Lobe Clear   Right Lower Lobe Diminished   Left Upper Lobe Clear   Left Lower Lobe Diminished   Cardiac   Cardiac (WDL) WDL   Cardiac Monitor   Telemetry Monitor On Yes   Gastrointestinal   Abdominal (WDL) WDL   Peripheral Vascular   Peripheral Vascular (WDL) WDL   Edema None   Skin Color/Condition   Skin Color/Condition (WDL) WDL   Musculoskeletal   Musculoskeletal (WDL) X   RUE Flaccid   LUE Full movement   RL Extremity Flaccid   LL Extremity Full movement   Genitourinary   Genitourinary (WDL) X  (catheter)   Anus/Rectum   Anus/Rectum (WDL) WDL   Urethral Catheter 16 fr   Placement Date/Time: 01/28/22 1840   Tube Size (fr): 16 fr  Catheter Balloon Size: 10 mL  Collection Container: Standard  Securement Method: Securing device (Describe)  Urine Returned: (c) Yes   Catheter Indications Urinary retention (acute or chronic), continuous bladder irrigation or bladder outlet obstruction   Urine Color Aviva   Urine Appearance Hazy   Psychosocial   Psychosocial (WDL) WDL   Pt in covid droplet isolation. Alert and oriented. Pt has rt side weakness. Rt arm elevated on pillow. Instructed pt if family comes to see her. To stroke her hand distal to proximal to promote circulation. Pt in good spirits. Therapy plans to see her today. Pt denies difficulty swallowing. Rasmussen intact, draining marcus, hazy urine. Encouraged increased po intake of fluids.

## 2022-02-01 NOTE — PLAN OF CARE
Problem: Falls - Risk of:  Goal: Will remain free from falls  2/1/2022 0943 by Filomena Mckee RN  Outcome: Ongoing  2/1/2022 0937 by Filomena Mckee RN  Outcome: Ongoing  Goal: Absence of physical injury  Outcome: Ongoing     Problem: Skin Integrity:  Goal: Will show no infection signs and symptoms  2/1/2022 0943 by Filomena Mckee RN  Outcome: Ongoing  2/1/2022 0937 by Filomena Mckee RN  Outcome: Ongoing  Goal: Absence of new skin breakdown  Outcome: Ongoing     Problem: Mobility - Impaired:  Goal: Mobility will improve  Outcome: Ongoing

## 2022-02-02 LAB
A/G RATIO: 1.1 (ref 0.8–2)
ALBUMIN SERPL-MCNC: 3.2 G/DL (ref 3.4–4.8)
ALP BLD-CCNC: 97 U/L (ref 25–100)
ALT SERPL-CCNC: 28 U/L (ref 4–36)
ANION GAP SERPL CALCULATED.3IONS-SCNC: 14 MMOL/L (ref 3–16)
AST SERPL-CCNC: 31 U/L (ref 8–33)
BILIRUB SERPL-MCNC: 0.6 MG/DL (ref 0.3–1.2)
BUN BLDV-MCNC: 17 MG/DL (ref 6–20)
CALCIUM SERPL-MCNC: 8.6 MG/DL (ref 8.5–10.5)
CHLORIDE BLD-SCNC: 103 MMOL/L (ref 98–107)
CO2: 20 MMOL/L (ref 20–30)
CREAT SERPL-MCNC: <0.5 MG/DL (ref 0.4–1.2)
GFR AFRICAN AMERICAN: >59
GFR NON-AFRICAN AMERICAN: >60
GLOBULIN: 2.8 G/DL
GLUCOSE BLD-MCNC: 135 MG/DL (ref 74–106)
GLUCOSE BLD-MCNC: 153 MG/DL (ref 74–106)
GLUCOSE BLD-MCNC: 178 MG/DL (ref 74–106)
GLUCOSE BLD-MCNC: 185 MG/DL (ref 74–106)
GLUCOSE BLD-MCNC: 186 MG/DL (ref 74–106)
HCT VFR BLD CALC: 43.3 % (ref 37–47)
HEMOGLOBIN: 15.1 G/DL (ref 11.5–16.5)
MCH RBC QN AUTO: 31.6 PG (ref 27–32)
MCHC RBC AUTO-ENTMCNC: 34.9 G/DL (ref 31–35)
MCV RBC AUTO: 90.6 FL (ref 80–100)
PDW BLD-RTO: 11.5 % (ref 11–16)
PERFORMED ON: ABNORMAL
PLATELET # BLD: 196 K/UL (ref 150–400)
PMV BLD AUTO: 10.1 FL (ref 6–10)
POTASSIUM REFLEX MAGNESIUM: 3.7 MMOL/L (ref 3.4–5.1)
RBC # BLD: 4.78 M/UL (ref 3.8–5.8)
SODIUM BLD-SCNC: 137 MMOL/L (ref 136–145)
TOTAL PROTEIN: 6 G/DL (ref 6.4–8.3)
WBC # BLD: 6.9 K/UL (ref 4–11)

## 2022-02-02 PROCEDURE — 6360000002 HC RX W HCPCS: Performed by: PHYSICIAN ASSISTANT

## 2022-02-02 PROCEDURE — 85027 COMPLETE CBC AUTOMATED: CPT

## 2022-02-02 PROCEDURE — 97803 MED NUTRITION INDIV SUBSEQ: CPT

## 2022-02-02 PROCEDURE — 92507 TX SP LANG VOICE COMM INDIV: CPT

## 2022-02-02 PROCEDURE — 6370000000 HC RX 637 (ALT 250 FOR IP): Performed by: INTERNAL MEDICINE

## 2022-02-02 PROCEDURE — 97530 THERAPEUTIC ACTIVITIES: CPT

## 2022-02-02 PROCEDURE — 97535 SELF CARE MNGMENT TRAINING: CPT

## 2022-02-02 PROCEDURE — 6370000000 HC RX 637 (ALT 250 FOR IP): Performed by: PHYSICIAN ASSISTANT

## 2022-02-02 PROCEDURE — 36415 COLL VENOUS BLD VENIPUNCTURE: CPT

## 2022-02-02 PROCEDURE — 80053 COMPREHEN METABOLIC PANEL: CPT

## 2022-02-02 PROCEDURE — 1200000002 HC SEMI PRIVATE SWING BED

## 2022-02-02 PROCEDURE — 97112 NEUROMUSCULAR REEDUCATION: CPT

## 2022-02-02 PROCEDURE — 2580000003 HC RX 258: Performed by: PHYSICIAN ASSISTANT

## 2022-02-02 PROCEDURE — 97110 THERAPEUTIC EXERCISES: CPT

## 2022-02-02 RX ORDER — INSULIN GLARGINE 100 [IU]/ML
10 INJECTION, SOLUTION SUBCUTANEOUS NIGHTLY
Status: DISCONTINUED | OUTPATIENT
Start: 2022-02-02 | End: 2022-02-05

## 2022-02-02 RX ORDER — CARVEDILOL 3.12 MG/1
3.12 TABLET ORAL 2 TIMES DAILY WITH MEALS
Status: DISCONTINUED | OUTPATIENT
Start: 2022-02-02 | End: 2022-02-04

## 2022-02-02 RX ADMIN — SODIUM CHLORIDE: 9 INJECTION, SOLUTION INTRAVENOUS at 08:03

## 2022-02-02 RX ADMIN — METFORMIN HYDROCHLORIDE 500 MG: 500 TABLET ORAL at 17:05

## 2022-02-02 RX ADMIN — OXYCODONE HYDROCHLORIDE AND ACETAMINOPHEN 500 MG: 500 TABLET ORAL at 07:59

## 2022-02-02 RX ADMIN — AMLODIPINE BESYLATE 5 MG: 5 TABLET ORAL at 07:59

## 2022-02-02 RX ADMIN — ATORVASTATIN CALCIUM 80 MG: 80 TABLET, FILM COATED ORAL at 21:20

## 2022-02-02 RX ADMIN — ALOGLIPTIN 25 MG: 12.5 TABLET, FILM COATED ORAL at 07:59

## 2022-02-02 RX ADMIN — ZINC SULFATE 220 MG (50 MG) CAPSULE 50 MG: CAPSULE at 08:00

## 2022-02-02 RX ADMIN — ASPIRIN 325 MG ORAL TABLET 325 MG: 325 PILL ORAL at 07:59

## 2022-02-02 RX ADMIN — DIPHENHYDRAMINE HYDROCHLORIDE 25 MG: 25 TABLET ORAL at 21:20

## 2022-02-02 RX ADMIN — CARVEDILOL 3.12 MG: 3.12 TABLET, FILM COATED ORAL at 17:05

## 2022-02-02 RX ADMIN — Medication 1 TABLET: at 07:59

## 2022-02-02 RX ADMIN — Medication 1 UNITS: at 22:04

## 2022-02-02 RX ADMIN — CARVEDILOL 3.12 MG: 3.12 TABLET, FILM COATED ORAL at 08:07

## 2022-02-02 RX ADMIN — Medication 1 UNITS: at 08:08

## 2022-02-02 RX ADMIN — ENOXAPARIN SODIUM 40 MG: 100 INJECTION SUBCUTANEOUS at 21:20

## 2022-02-02 RX ADMIN — Medication 1 UNITS: at 11:04

## 2022-02-02 RX ADMIN — METFORMIN HYDROCHLORIDE 500 MG: 500 TABLET ORAL at 07:59

## 2022-02-02 RX ADMIN — Medication 10 UNITS: at 22:04

## 2022-02-02 RX ADMIN — Medication 1 UNITS: at 17:08

## 2022-02-02 RX ADMIN — SODIUM CHLORIDE: 9 INJECTION, SOLUTION INTRAVENOUS at 17:06

## 2022-02-02 RX ADMIN — PANTOPRAZOLE SODIUM 40 MG: 40 TABLET, DELAYED RELEASE ORAL at 06:19

## 2022-02-02 RX ADMIN — LISINOPRIL 20 MG: 20 TABLET ORAL at 07:59

## 2022-02-02 ASSESSMENT — PAIN SCALES - GENERAL
PAINLEVEL_OUTOF10: 0

## 2022-02-02 NOTE — FLOWSHEET NOTE
02/02/22 0951   Vital Signs   Temp 98.6 °F (37 °C)   Temp Source Oral   Pulse 84   Heart Rate Source Monitor   Resp 16   BP (!) 161/79   BP Location Left upper arm   MAP (mmHg) 102   Patient Position Supine   Level of Consciousness Alert (0)   MEWS Score 1   Oxygen Therapy   SpO2 96 %   O2 Device None (Room air)   [facility] aware of blood pressure this am hydralazine>165 on prn list- no new orders at this time will monitor

## 2022-02-02 NOTE — PROGRESS NOTES
[facility]  SPEECH/LANGUAGE PATHOLOGY   INPATIENT DAILY NOTE      [x] Daily           [] Discharge    Patient:Noreen New      :1965  MGM:[id number]  Rehab Dx/Hx: Pneumonia due to COVID-19 virus [U07.1, J12.82]  Declining functional status [R53.81]   No Known Allergies  Precautions: Restrictions/Precautions: General Precautions,Isolation,Fall Risk,Contact Precautions     Home Situation/IADL:   Social/Functional History  Lives With: Spouse  Type of Home: House  Home Layout: Two level,Able to Live on Main level with bedroom/bathroom  Home Access: Stairs to enter without rails  Entrance Stairs - Number of Steps: 2  Bathroom Shower/Tub: Tub/Shower unit,Walk-in shower  Bathroom Toilet: Standard  Bathroom Equipment: Grab bars in shower  ADL Assistance: [address]: Independent  Homemaking Responsibilities: Yes  Ambulation Assistance: Independent  Transfer Assistance: Independent  Active : Yes  Date of Admit: 2022  Room #: G104/G104-01      Number of Minutes/Billable Intervention  Cog/Memory Deficits     Aphasia/Language     Dysarthria/Speech 30    Apraxia/Speech     Dysphagia/Swallowing     Group     Other    TOTAL Minutes Billed  30    Variance          Date: 2022  Day of ARU Week:  6       SLP Individual Minutes  Time In: 1515  Time Out: [address]  Minutes: 30 [place] in:    [place] out-   [place] total-      Variance/Reason:  [] Refusal due to   [] Medical hold/reason  [] Illness   [] Off Unit for test/procedure  [] Extra time needed to complete task  [] Other (specify)    Activity completed: Patient upright in chair, pleasant and agreeable to ST tx. Patient reported improvement with facial, arm and leg strength, as well as with word finding skills. Completed oral motor exercises (labial retraction/protraction, labial press) x3x10 with min cues provided. Targeted labial closure with initial and final /p/ words with 85% acc with min cues.  Speech Reaction to Tx:   [] Significant change in status:      Electronically Signed by  Nura Novak MS, CCC-SLP    2/2/2022  3:46 PM

## 2022-02-02 NOTE — FLOWSHEET NOTE
02/02/22 0800   Assessment   Charting Type Shift assessment   Neurological   Neuro (WDL) X   Level of Consciousness Alert (0)   Cognition Appropriate judgement   Language Clear;Paraphasic errors   Size R Pupil (mm) 3   R Pupil Shape Round   Size L Pupil (mm) 3   L Pupil Shape Round   R Hand  Absent   L Hand  Strong   R Foot Dorsiflexion Absent   L Foot Dorsiflexion Strong   R Foot Plantar Flexion Absent   L Foot Plantar Flexion Strong   RUE Motor Response Flaccid   Sensation RUE Decreased   LUE Motor Response Responds to command;Normal extension;Normal flexion   Sensation LUE Full sensation   RLE Motor Response Flaccid   Sensation RLE Tingling   LLE Motor Response Responds to command   Sensation LLE Full sensation   Gag Present   Tongue Deviation None   NIHSS Stroke Scale Assessed No   Swallow Screening   Is the patient able to remain alert for testing? Yes   Was the Patient Eating a Modified Diet Prior to being Admitted?  No   [place] Coma Scale   Eye Opening 4   Best Verbal Response 5   Best Motor Response 6   [place] Coma Scale Score 15   HEENT   HEENT (WDL) WDL   Respiratory   Respiratory Pattern Regular   Respiratory Depth Normal   Respiratory Quality/Effort Unlabored   Chest Assessment Chest expansion symmetrical   L Breath Sounds Clear   R Breath Sounds Clear   Breath Sounds   Right Upper Lobe Clear   Right Middle Lobe Clear   Right Lower Lobe Diminished   Left Upper Lobe Clear   Left Lower Lobe Diminished   Cardiac   Cardiac (WDL) WDL   Cardiac Monitor   Telemetry Monitor On Yes   Gastrointestinal   Abdominal (WDL) WDL   Peripheral Vascular   Peripheral Vascular (WDL) WDL   Edema None   Skin Color/Condition   Skin Color/Condition (WDL) WDL   Musculoskeletal   Musculoskeletal (WDL) X   RUE Flaccid   LUE Full movement   RL Extremity Flaccid   LL Extremity Full movement   Genitourinary   Genitourinary (WDL) X  (catheter)   Anus/Rectum   Anus/Rectum (WDL) WDL   Urethral Catheter 16 fr   Placement Date/Time: 01/28/22 1840   Tube Size (fr): 16 fr  Catheter Balloon Size: 10 mL  Collection Container: Standard  Securement Method: Securing device (Describe)  Urine Returned: (c) Yes   Catheter Indications Urinary retention (acute or chronic), continuous bladder irrigation or bladder outlet obstruction   Urine Color Aviva   Urine Appearance Hazy   Psychosocial   Psychosocial (WDL) WDL   Pt alert times 4- Am assessment completed- Pt able to take am medications well per [facility]- Breathing equal and  Unlabored- Right arm elevated on pillow right leg in boot- No complaints of pain- Call bell within reach -Will monitor

## 2022-02-02 NOTE — PROGRESS NOTES
Nutrition Assessment     Type and Reason for Visit: Patient Education (follow up)    Nutrition Recommendations/Plan:Recommend to continue with current diet and encourage intakes of meals and ONS as appropriate. Encourage compliance of diet. Nutrition Assessment:  Pt continues at moderate risk for ongoing nutritional compromise r/t poor intakes and rt sided hemiplegia. Will modify ONS for preference and offer edu for diet. Malnutrition Assessment:  Malnutrition Status: Insufficient data    Estimated Daily Nutrient Needs:  Energy (kcal): 5657-7225 (22-24 kcal/kg cbw); Weight Used for Energy Requirements:  Current     Protein (g): 71 (1.3 gm/kg IBW); Weight Used for Protein Requirements:  Ideal        Fluid (ml/day): 9900-0235; Weight Used for Fluid Requirements:  1 ml/kcal      Nutrition Related Findings: Pt no longer in isolation. No new weight. No edema reported. Has RT sided hemiplegia. Poor intakes. Labs: Glu-120-153;  PMH: CVA, T2D      Current Nutrition Therapies:    ADULT DIET; Regular; 4 carb choices (60 gm/meal);  Low Fat/Low Chol/High Fiber/JARROD; Low Sodium (2 gm)  ADULT ORAL NUTRITION SUPPLEMENT; Breakfast, Lunch, Dinner; Diabetic Oral Supplement    Anthropometric Measures:  · Height: 5' 4\" (162.6 cm)  · Current Body Wt: 147 lb 9.6 oz (67 kg)   · BMI: 25.3    Nutrition Diagnosis:   · Predicted inadequate energy intake related to cognitive or neurological impairment,other (comment) (dislikes hospital food) as evidenced by intake 0-25%    · Food & Nutrition-related knowledge deficit related to endocrine dysfuntion as evidenced by lab values      Nutrition Interventions:   Food and/or Nutrient Delivery:  Continue Current Diet,Modify Oral Nutrition Supplement  Nutrition Education/Counseling:  Education needed,Education initiated,Education completed (left contact info for follow questions or more info.)   Coordination of Nutrition Care:  Continue to monitor while inpatient,Interdisciplinary Rounds    Goals:  to meet est needs for age/condition and provide nutrition counseling for diet       Nutrition Monitoring and Evaluation:   Behavioral-Environmental Outcomes:  Knowledge or Skill   Food/Nutrient Intake Outcomes:  Food and Nutrient Intake,Supplement Intake  Physical Signs/Symptoms Outcomes:  Biochemical Data     Discharge Planning:    Continue current diet,Recommend pursue outpatient nutrition counseling     Electronically signed by Gerardo Alegria MS, RD, LD on 2/2/22 at 2:14 PM EST

## 2022-02-02 NOTE — PROGRESS NOTES
Physical Therapy  Facility/Department: [facility] MED SURG  Daily Treatment Note  NAME: Noreen Gallardo  : 1965  MRN: [id number]    Date of Service: 2022    Discharge Recommendations:  Continue to assess pending progress      Assessment   Assessment: Cotreated with OT. Patient required verbal cues and Mod A of 2 for rolling to her left. Transitioned supine to sit with Min A of 2. Provided patient cues and Mod A to maintain sitting balance while washing her face and changing shirts with OT assistance. Also focused on sitting balance with reaching and weight-shifting while sitting EOB. Patient stood at bedside with Mod A of 2 and was dependent for LB dressing. Transferred stand-pivot with Max A of 2 to the recliner. Patient reports it feels good to sit up, \"Best I've felt since I've been here. \"  Chair alarm and lift pad placed under patient for use of mechanical lift later to get back to bed. REQUIRES PT FOLLOW UP: Yes  Activity Tolerance  Activity Tolerance: Patient Tolerated treatment well     Patient Diagnosis(es): There were no encounter diagnoses. has a past medical history of Diabetes mellitus type 2 with complications ([facility] [address] History of cerebrovascular accident (CVA) with residual deficit, and HTN (hypertension). has no past surgical history on file. Restrictions  Restrictions/Precautions  Restrictions/Precautions: General Precautions,Isolation,Fall Risk,Contact Precautions  Required Braces or Orthoses?: No  Subjective   General  Chart Reviewed: Yes  Response To Previous Treatment: Patient with no complaints from previous session. Family / Caregiver Present: No  Subjective  Subjective: Patient states she's doing ok and is ready for therapy.   Pain Screening  Patient Currently in Pain: Denies  Vital Signs  Patient Currently in Pain: Denies       Objective   Bed mobility  Rolling to Left: Moderate assistance;2 Person assistance  Supine to Sit: Minimal assistance;2 Person assistance  Scooting: Moderate assistance;2 Person assistance  Transfers  Sit to Stand: Moderate Assistance;2 Person Assistance  Stand to sit: Moderate Assistance;2 Person Assistance  Bed to Chair: Maximum assistance;2 Person Assistance        Balance  Posture: Fair  Sitting - Static: Fair  Sitting - Dynamic: Fair;-  Exercises  Hip Flexion: 10 LLE  Hip Abduction: 10 LLE  Knee Long Arc Quad: 10 LLE  Ankle Pumps: 10 LLE  Comments: PROM for R LE      Goals  Long term goals  Time Frame for Long term goals : 4 weeks  Long term goal 1: Patient to achieve bed mobility with mod assist x 1. Long term goal 2: Patient to transfer supine to sit and sit to supine with mod assist x 1. Long term goal 3: Patient to transfer bed to chair with sliding board with mod assist x 1. Long term goal 4: Patient to achieve Fair + static sitting balance. Plan    Plan  Times per week: 4-5 x week  Plan weeks: 4 weeks  Current Treatment Recommendations: Strengthening,Neuromuscular Re-education,Home Exercise Program,Safety Education & Training,Patient/Caregiver Education & Training,Manual Therapy - Soft Tissue Mobilization,Manual Therapy - Joint Manipulation,Functional Mobility Training,Transfer Training,ADL/Self-care Training,Balance Training  Safety Devices  Type of devices: Left in chair,Chair alarm in place,Call light within reach,Nurse notified     Therapy Time   Individual Concurrent Group Co-treatment   Time In [facility]         Time Out 1161         Minutes 75            This note serves as D/C summary if patient is discharged prior to next visit.   Carolina Mehta, PTA

## 2022-02-03 ENCOUNTER — APPOINTMENT (OUTPATIENT)
Dept: CT IMAGING | Facility: HOSPITAL | Age: 57
DRG: 056 | End: 2022-02-03
Attending: INTERNAL MEDICINE
Payer: COMMERCIAL

## 2022-02-03 LAB
BACTERIA: ABNORMAL /HPF
BILIRUBIN URINE: NEGATIVE
BLOOD, URINE: ABNORMAL
CLARITY: ABNORMAL
COLOR: YELLOW
EPITHELIAL CELLS, UA: ABNORMAL /HPF (ref 0–5)
GLUCOSE BLD-MCNC: 129 MG/DL (ref 74–106)
GLUCOSE BLD-MCNC: 168 MG/DL (ref 74–106)
GLUCOSE BLD-MCNC: 185 MG/DL (ref 74–106)
GLUCOSE BLD-MCNC: 207 MG/DL (ref 74–106)
GLUCOSE URINE: NEGATIVE MG/DL
KETONES, URINE: NEGATIVE MG/DL
LEUKOCYTE ESTERASE, URINE: ABNORMAL
MICROSCOPIC EXAMINATION: YES
NITRITE, URINE: NEGATIVE
PERFORMED ON: ABNORMAL
PH UA: 6.5 (ref 5–8)
PROTEIN UA: 30 MG/DL
RBC UA: ABNORMAL /HPF (ref 0–4)
SPECIFIC GRAVITY UA: 1.02 (ref 1–1.03)
URINE REFLEX TO CULTURE: YES
URINE TYPE: ABNORMAL
UROBILINOGEN, URINE: 2 E.U./DL
WBC UA: ABNORMAL /HPF (ref 0–5)

## 2022-02-03 PROCEDURE — 97530 THERAPEUTIC ACTIVITIES: CPT

## 2022-02-03 PROCEDURE — 6360000002 HC RX W HCPCS: Performed by: PHYSICIAN ASSISTANT

## 2022-02-03 PROCEDURE — 6370000000 HC RX 637 (ALT 250 FOR IP): Performed by: PHYSICIAN ASSISTANT

## 2022-02-03 PROCEDURE — 87086 URINE CULTURE/COLONY COUNT: CPT

## 2022-02-03 PROCEDURE — 97112 NEUROMUSCULAR REEDUCATION: CPT

## 2022-02-03 PROCEDURE — 87088 URINE BACTERIA CULTURE: CPT

## 2022-02-03 PROCEDURE — 2580000003 HC RX 258: Performed by: PHYSICIAN ASSISTANT

## 2022-02-03 PROCEDURE — 92507 TX SP LANG VOICE COMM INDIV: CPT

## 2022-02-03 PROCEDURE — 1200000002 HC SEMI PRIVATE SWING BED

## 2022-02-03 PROCEDURE — 87186 SC STD MICRODIL/AGAR DIL: CPT

## 2022-02-03 PROCEDURE — 97535 SELF CARE MNGMENT TRAINING: CPT

## 2022-02-03 PROCEDURE — 81001 URINALYSIS AUTO W/SCOPE: CPT

## 2022-02-03 PROCEDURE — 6370000000 HC RX 637 (ALT 250 FOR IP): Performed by: INTERNAL MEDICINE

## 2022-02-03 PROCEDURE — 70450 CT HEAD/BRAIN W/O DYE: CPT

## 2022-02-03 RX ADMIN — ALOGLIPTIN 25 MG: 12.5 TABLET, FILM COATED ORAL at 08:39

## 2022-02-03 RX ADMIN — OXYCODONE HYDROCHLORIDE AND ACETAMINOPHEN 500 MG: 500 TABLET ORAL at 08:39

## 2022-02-03 RX ADMIN — METFORMIN HYDROCHLORIDE 500 MG: 500 TABLET ORAL at 08:39

## 2022-02-03 RX ADMIN — AMLODIPINE BESYLATE 5 MG: 5 TABLET ORAL at 08:39

## 2022-02-03 RX ADMIN — CEFTRIAXONE 1000 MG: 1 INJECTION, POWDER, FOR SOLUTION INTRAMUSCULAR; INTRAVENOUS at 10:12

## 2022-02-03 RX ADMIN — Medication 1 TABLET: at 08:39

## 2022-02-03 RX ADMIN — PANTOPRAZOLE SODIUM 40 MG: 40 TABLET, DELAYED RELEASE ORAL at 05:44

## 2022-02-03 RX ADMIN — ZINC SULFATE 220 MG (50 MG) CAPSULE 50 MG: CAPSULE at 08:39

## 2022-02-03 RX ADMIN — Medication 1 UNITS: at 21:47

## 2022-02-03 RX ADMIN — ENOXAPARIN SODIUM 40 MG: 100 INJECTION SUBCUTANEOUS at 21:29

## 2022-02-03 RX ADMIN — SODIUM CHLORIDE: 9 INJECTION, SOLUTION INTRAVENOUS at 16:11

## 2022-02-03 RX ADMIN — Medication 2 UNITS: at 11:04

## 2022-02-03 RX ADMIN — CARVEDILOL 3.12 MG: 3.12 TABLET, FILM COATED ORAL at 08:39

## 2022-02-03 RX ADMIN — CARVEDILOL 3.12 MG: 3.12 TABLET, FILM COATED ORAL at 17:04

## 2022-02-03 RX ADMIN — Medication 1 UNITS: at 17:06

## 2022-02-03 RX ADMIN — HYDRALAZINE HYDROCHLORIDE 25 MG: 25 TABLET, FILM COATED ORAL at 08:39

## 2022-02-03 RX ADMIN — METFORMIN HYDROCHLORIDE 500 MG: 500 TABLET ORAL at 17:04

## 2022-02-03 RX ADMIN — Medication 10 UNITS: at 21:47

## 2022-02-03 RX ADMIN — ASPIRIN 325 MG ORAL TABLET 325 MG: 325 PILL ORAL at 08:39

## 2022-02-03 RX ADMIN — LISINOPRIL 20 MG: 20 TABLET ORAL at 08:39

## 2022-02-03 RX ADMIN — ATORVASTATIN CALCIUM 80 MG: 80 TABLET, FILM COATED ORAL at 21:29

## 2022-02-03 ASSESSMENT — PAIN SCALES - GENERAL
PAINLEVEL_OUTOF10: 0

## 2022-02-03 NOTE — FLOWSHEET NOTE
02/02/22 2341   Assessment   Charting Type Shift assessment   Neurological   Neuro (WDL) X   Level of Consciousness Alert (0)   Orientation Level Oriented X4   Cognition Appropriate judgement   Language Clear;Paraphasic errors   Size R Pupil (mm) 3   R Pupil Shape Round   R Pupil Reaction Brisk   Size L Pupil (mm) 3   L Pupil Shape Round   L Pupil Reaction Brisk   R Hand  Absent   L Hand  Strong   R Foot Dorsiflexion Absent   L Foot Dorsiflexion Strong   R Foot Plantar Flexion Absent   L Foot Plantar Flexion Strong   RUE Motor Response Flaccid   Sensation RUE Decreased   LUE Motor Response Responds to command;Normal extension;Normal flexion   Sensation LUE Full sensation   RLE Motor Response Flaccid   Sensation RLE Tingling   LLE Motor Response Responds to command   Sensation LLE Full sensation   Gag Present   Tongue Deviation None   NIHSS Stroke Scale Assessed No   Swallow Screening   Is the patient able to remain alert for testing? Yes   Was the Patient Eating a Modified Diet Prior to being Admitted?  No   [place] Coma Scale   Eye Opening 4   Best Verbal Response 5   Best Motor Response 6   [place] Coma Scale Score 15   HEENT   HEENT (WDL) WDL   Respiratory   Respiratory (WDL) WDL   Respiratory Pattern Regular   Respiratory Depth Normal   Respiratory Quality/Effort Unlabored   Chest Assessment Chest expansion symmetrical   L Breath Sounds Clear   R Breath Sounds Clear   Breath Sounds   Right Upper Lobe Clear   Right Middle Lobe Clear   Right Lower Lobe Diminished   Left Upper Lobe Clear   Left Lower Lobe Diminished   Cardiac   Cardiac (WDL) WDL   Cardiac Monitor   Telemetry Monitor On No   Gastrointestinal   Abdominal (WDL) WDL   Peripheral Vascular   Peripheral Vascular (WDL) WDL   Edema None   Skin Color/Condition   Skin Color/Condition (WDL) WDL   Skin Integrity   Skin Integrity (WDL) WDL   Musculoskeletal   Musculoskeletal (WDL) X   RUE Flaccid   LUE Full movement   RL Extremity Flaccid   LL Extremity Full movement   Genitourinary   Genitourinary (WDL) X   Urethral Catheter 16 fr   Placement Date/Time: 01/28/22 1840   Tube Size (fr): 16 fr  Catheter Balloon Size: 10 mL  Collection Container: Standard  Securement Method: Securing device (Describe)  Urine Returned: (c) Yes   Catheter Indications Urinary retention (acute or chronic), continuous bladder irrigation or bladder outlet obstruction   Psychosocial   Psychosocial (WDL) WDL

## 2022-02-03 NOTE — PROGRESS NOTES
[facility]  SPEECH/LANGUAGE PATHOLOGY   INPATIENT DAILY NOTE      [x] Daily           [] Discharge    Patient:Noreen Stanton      :1965  OFT:[id number]  Rehab Dx/Hx: Pneumonia due to COVID-19 virus [U07.1, J12.82]  Declining functional status [R53.81]   No Known Allergies  Precautions: Restrictions/Precautions: General Precautions,Isolation,Fall Risk,Contact Precautions     Home Situation/IADL:   Social/Functional History  Lives With: Spouse  Type of Home: House  Home Layout: Two level,Able to Live on Main level with bedroom/bathroom  Home Access: Stairs to enter without rails  Entrance Stairs - Number of Steps: 2  Bathroom Shower/Tub: Tub/Shower unit,Walk-in shower  Bathroom Toilet: Standard  Bathroom Equipment: Grab bars in shower  ADL Assistance: [address]: Independent  Homemaking Responsibilities: Yes  Ambulation Assistance: Independent  Transfer Assistance: Independent  Active : Yes  Date of Admit: 2022  Room #: G104/G104-01      Number of Minutes/Billable Intervention  Cog/Memory Deficits     Aphasia/Language     Dysarthria/Speech 35   Apraxia/Speech     Dysphagia/Swallowing     Group     Other    TOTAL Minutes Billed  35    Variance          Date: 2/3/2022  Day of ARU Week:  7       SLP Individual Minutes  Time In: 1230  Time Out: [address]  Minutes: 30 [place] in:    [place] out-   [place] total-      Variance/Reason:  [] Refusal due to   [] Medical hold/reason  [] Illness   [] Off Unit for test/procedure  [] Extra time needed to complete task  [] Other (specify)    Activity completed: Patient upright in bed, pleasant and agreeable to ST tx. Patient reported \"I was a little disoriented this morning. I have a UTI\". Completed oral motor exercises (lingual retraction/protraction, labial retraction/protraction) x3x10 with mod visual and verbal cues provided. Patient demonstrated min word finding deficits during functional conversation.  Targeted divergent naming tasks with 20% accuracy; patient required max phonemic and semantic cues. Pain: None reported or indicated  Current Diet: ADULT DIET; Regular; 4 carb choices (60 gm/meal); Low Fat/Low Chol/High Fiber/JARROD; Low Sodium (2 gm)  ADULT ORAL NUTRITION SUPPLEMENT; Breakfast, Lunch, Dinner; Diabetic Oral Supplement  Subjective: Patient upright in bed, pleasant and agreeable to ST eval.       Goals and POC: Co-treats where appropriate with PT or OT to facilitate patient goals in functional tasks. ADD GOAL: Patient will complete word finding tasks with 80% accuracy over consecutive sessions to improve overall expressive communication skills. LTG Goal 1: Patient will demonstrate with increased speech intelligibility for increased communcation with wants/needs 100% accuracy    Timeframe for Long-term Goals: 4 weeks      Short-term Goals  Timeframe for Short-term Goals: 2 weeks  Goal 1: Patient will peform oral motor exercises for increased speech intelligibiltity 20/20 x  Goal 2: Patient will demonstrate with increased speech intelligibility for increased communication with wants/needs 90% accuracy        Short-term Goals  Timeframe for Short-term Goals: ST eval only          Alarm placed: []bed [x]chair   []other:          Barriers to progress:   [] Fatigue        [] Cognitive Deficits   [] Memory Deficits   [] Reduced Attn   [] Self Limiting Behaviors    [x] Reduced insight/awareness     [x] Visual Deficits   [] Premorbid Conditions   [] Impulsivity     [] Other      Education/Interventions used this date: Aspiration precaution guidelines/safe swallow strategies   [x] Progress was updated and reviewed with patient and/or family this date.       Interventions used this date:  [x] Speech/Language Treatment    [] Instruction in HEP    Group   [] Dysphagia Treatment   [] Cognitive Skill Imtiaz    Other:         Assessment / Impression                                                          Treatment/Activity Tolerance:   [x] Tolerated Treatment well:     [] Patient limited by fatigue/pain:       [] Patient limited by medical complications:    [] Adverse Reaction to Tx:   [] Significant change in status:      Electronically Signed by  Sandra Gusman MS    2/3/2022  2:14 PM

## 2022-02-03 NOTE — PROGRESS NOTES
Physical Therapy  Facility/Department: [facility] MED SURG  Daily Treatment Note  NAME: Noreen Rebollar  : 1965  MRN: [id number]    Date of Service: 2/3/2022    Discharge Recommendations:  Continue to assess pending progress      Assessment   Assessment: Cotreated with OT. Patient presents partially sitting up in bed with  visiting. Patient required verbal cues and Mod A of 1 for rolling to her left. Transitioned supine to sit with Min A of 1. Patient able to maintain sitting balance while holding on to rail with L hand. Required Min A to maintain sitting balance during therapeutic activities using her L UE. Engaged patient in L LE AROM exercises while seated with assistance to maintain balance. Patient stood at bedside with Mod A of 2 and transferred stand-pivot with Max A of 2 to the recliner. R ankle PROM completed and heel protector boot donned to decrease risk of foot drop and contracture. Chair alarm and lift pad placed under patient. Patient left in care of OT. Activity Tolerance  Activity Tolerance: Patient Tolerated treatment well     Patient Diagnosis(es): There were no encounter diagnoses. has a past medical history of Diabetes mellitus type 2 with complications ([facility] [address] History of cerebrovascular accident (CVA) with residual deficit, and HTN (hypertension). has no past surgical history on file. Restrictions  Restrictions/Precautions  Restrictions/Precautions: General Precautions,Isolation,Fall Risk,Contact Precautions  Required Braces or Orthoses?: No  Subjective   General  Chart Reviewed: Yes  Response To Previous Treatment: Patient with no complaints from previous session. Family / Caregiver Present: Yes ()  Subjective  Subjective: Patient offers no new c/o, pleasant and ready for therapy.   Pain Screening  Patient Currently in Pain: Denies  Vital Signs  Patient Currently in Pain: Denies       Objective   Bed mobility  Rolling to Left: Moderate assistance  Supine to Sit: Minimal assistance  Scooting: Moderate assistance;2 Person assistance  Transfers  Sit to Stand: Moderate Assistance;2 Person Assistance  Stand to sit: Moderate Assistance;2 Person Assistance  Bed to Chair: Maximum assistance;2 Person Assistance        Balance  Posture: Fair  Sitting - Static: Fair  Sitting - Dynamic: Fair;-  Standing - Static: Poor  Standing - Dynamic: Poor;-  Exercises  Hip Flexion: 10 LLE  Hip Abduction: 10 LLE  Knee Long Arc Quad: 10 LLE  Ankle Pumps: 10 LLE  Comments: PROM for R LE      Goals  Long term goals  Time Frame for Long term goals : 4 weeks  Long term goal 1: Patient to achieve bed mobility with mod assist x 1. Long term goal 2: Patient to transfer supine to sit and sit to supine with mod assist x 1. Long term goal 3: Patient to transfer bed to chair with sliding board with mod assist x 1. Long term goal 4: Patient to achieve Fair + static sitting balance. Plan    Plan  Times per week: 4-5 x week  Plan weeks: 4 weeks  Current Treatment Recommendations: Strengthening,Neuromuscular Re-education,Home Exercise Program,Safety Education & Training,Patient/Caregiver Education & Training,Manual Therapy - Soft Tissue Mobilization,Manual Therapy - Joint Manipulation,Functional Mobility Training,Transfer Training,ADL/Self-care Training,Balance Training  Safety Devices  Type of devices: Left in chair,Chair alarm in place,Call light within reach ( and OT present)     Therapy Time   Individual Concurrent Group Co-treatment   Time In 6172         Time Out 0755         Minutes 40              This note serves as D/C summary if patient is discharged prior to next visit.   Yelena Fu, PTA

## 2022-02-03 NOTE — PROGRESS NOTES
Occupational Therapy  Daily Note  Date: 2/3/2022   Patient Name: Prince Pearl Goodrich  MRN: [id number]     : 1965    Date of Service: 2/3/2022  Discharge Recommendations:  Continue to assess pending progress    OT Equipment Recommendations: Will assess needs closer to DC pending progress: Att, pt will need WC, BSC, Bed rail    Assessment  Performance deficits / Impairments: Decreased functional mobility ; Decreased ADL status; Decreased ROM; Decreased strength;Decreased endurance;Decreased balance;Decreased coordination;Decreased posture;Decreased high-level IADLs;Decreased fine motor control;Decreased vision/visual deficit    Pt seen for skilled OT interventions; no pain reported; co tx w PTA. Pt able to perform bed mobility w Min A x2 w Mod A x2 for scooting & rolling to left. Sittting EOB, pt required CGA due to balance deficits w verbal cues to utilize unaffected side for upright balance support. Pt able to perform stand pivot transfer w Max A x2. During tasks, Pt presents w R sided hemiplegia w fears of falling. Pt tolerated tx well w increased fatigue during activity. No volitional movement in RUE noted this date. OT completed hand hugs & jt compressions for body awareness to R side offering proprioceptive inputs. RUE elevated & positioned on pillow, boot placed on RLE foot, lift pad placed underneath pt for return to bed at later requested time- nursing staff notified. Pt in bedside chair w pressure alarm, call bell in reach, all needs met. Patient Diagnosis(es): There were no encounter diagnoses. has a past medical history of Diabetes mellitus type 2 with complications ([address] History of cerebrovascular accident (CVA) with residual deficit, and HTN (hypertension). has no past surgical history on file.      Restrictions  Restrictions/Precautions: General Precautions,Isolation,Fall Risk,Contact Precautions  Required Braces or Orthoses?: No    Subjective  Chart Reviewed: Yes  Patient assessed for rehabilitation services?: Yes  Family / Caregiver Present: No  Referring Practitioner: Jaci Francois PA-C  Diagnosis: Declining functional status    Subjective: Pt agreeable to OT services. Pt reports no pain & feeling well. Patient Currently in Pain: Denies    Objective  Orientation Level: Oriented to person;Oriented to situation;Disoriented to time;Oriented to place  Observation: Pt lying in bed, NAD, RA, Pleasant & cooperative    Posture: Fair  Sitting balance: Contact guard assistance  Bed mobility: Minimal assistance;2 Person assistance  Scooting: Moderate assistance;2 Person assistance  Sit to Stand: Moderate assistance;2 Person Assistance  Bed to Chair Stand Pivot: Maximum assistance;2 Person Assistance  Face washing: Setup  Teeth brushing: Minimum assistance    Cognition: Pt able to follow one step commands, difficulty with multi step commands, STM appears intact, expressive aphasia    RUE hand hugs & jt compressions     Plan  Times per week: 3-5  Times per day: Daily  Plan weeks: 2-4  Current Treatment Recommendations: Strengthening,ROM,Balance Training,Functional Mobility Training,Endurance Training,Equipment Evaluation, Education, & procurement,Neuromuscular Re-education,Self-Care / ADL,Patient/Caregiver Education & Training,Safety Education & Training,Positioning    Goals  Short term goals  Time Frame for Short term goals: 2 weeks  Short term goal 1: pt to complete bed mobility with MOD I. Short term goal 2: pt to complete sliding board transfer with min A. Short term goal 3: pt to complete UB dressing with CGA and LB dressing with min A. Short term goal 4: pt to complete hygiene/grooming with HERNANDEZ seated at EOB with no VC. Short term goal 5: Pt to tolerate sitting at EOB x15 minutes with SBA. Long term goals  Time Frame for Long term goals : 4 weeks  Long term goal 1: pt to complete sliding board transfers with HERNANDEZ. Long term goal 2: Pt to self propel x100 feet with SUP.   Long term goal 3: Pt to complete UB dressing with HERNANDEZ and LB dressing with SBA  Long term goal 4: Pt to complete bathing with min A. Long term goal 5: Pt to demo ability to complete visual scanning with no verbal cuing. Therapy Time   Individual Co-treatment   Time In  114   Time Out  156   Minutes   42     This note serves as a DC summary in the event of pt discharge.    Luis Hung OTR/L

## 2022-02-03 NOTE — PROGRESS NOTES
Occupational Therapy  Daily Note  Date: 2022   Patient Name: Arlene Powers  MRN: [id number]     : 1965    Date of Service: 2/3/2022  Discharge Recommendations:  Continue to assess pending progress    OT Equipment Recommendations: Will assess needs closer to DC pending progress: Att, pt will need WC, BSC, Bed rail    Assessment  Performance deficits / Impairments: Decreased functional mobility ; Decreased ADL status; Decreased ROM; Decreased strength;Decreased endurance;Decreased balance;Decreased coordination;Decreased posture;Decreased high-level IADLs;Decreased fine motor control;Decreased vision/visual deficit    Pt seen for skilled OT interventions; no pain reported; co tx w PTA. Pt c/o boredom & would benefit from activity staff consult. Pt able to perform bed mobility w Min A x2 w Mod A x2 for scooting & rolling to left. With sitting EOB, pt required CGA due to balance deficits w verbal cues to utilize unaffected side for upright balance support. Pt able to don/doff shirt w Mod A after demo w CGA for upright sitting balance; DEP for LB dressing. Pt able to perform stand pivot transfer w Max A x2. During tasks, pt presents w R sided hemiplegia; OT provided pt education about neglect techniques to use. Pt tolerated tx well w increased fatigue during activity. No volitional movement in RUE noted this date. OT completed PROM & provided education about self PROM to complete. RUE elevated & positioned on pillow, boot placed on RLE foot, lift pad placed underneath pt for return to bed at later requested time- nursing staff notified. Pt in bedside chair w pressure alarm, call bell in reach, all needs met. Patient Diagnosis(es): There were no encounter diagnoses. has a past medical history of Diabetes mellitus type 2 with complications ([facility] [address] History of cerebrovascular accident (CVA) with residual deficit, and HTN (hypertension).    has no past surgical history on file.     Restrictions  Restrictions/Precautions: General Precautions,Isolation,Fall Risk,Contact Precautions  Required Braces or Orthoses?: No    Subjective  Chart Reviewed: Yes  Patient assessed for rehabilitation services?: Yes  Family / Caregiver Present: No  Referring Practitioner: Alexa Rao PA-C  Diagnosis: Declining functional status    Subjective: Pt agreeable to OT services. Pt reports no pain. Patient Currently in Pain: Denies    Objective   Hearing: Within functional limits    Overall Orientation Status: Impaired  Orientation Level: Oriented to person;Oriented to situation;Disoriented to time;Oriented to place  Observation: Pt lying in bed, NAD, RA, Pleasant & cooperative    Posture: Fair  Sitting balance: Contact guard assistance  Bed mobility: Minimal assistance;2 Person assistance  Scooting: Moderate assistance;2 Person assistance  Sit to Stand: Moderate assistance;2 Person Assistance    UB dressing: Moderate assistance  LB dressing: Dependent  Face washing: Setup    Cognition: Pt able to follow one step commands, difficulty with multi step commands, STM appears intact, expressive aphasia    RUE PROM x15  Shoulder flexion/extension  Shoulder add/abduction  Horizontal add/abduction  Internal/external rotation  Elbow flexion/extension  Forearm supination/pronation  Wrist flexion/extension     Plan  Times per week: 3-5  Times per day: Daily  Plan weeks: 2-4  Current Treatment Recommendations: Strengthening,ROM,Balance Training,Functional Mobility Training,Endurance Training,Equipment Evaluation, Education, & procurement,Neuromuscular Re-education,Self-Care / ADL,Patient/Caregiver Education & Training,Safety Education & Training,Positioning    Goals  Short term goals  Time Frame for Short term goals: 2 weeks  Short term goal 1: pt to complete bed mobility with MOD I. Short term goal 2: pt to complete sliding board transfer with min A.   Short term goal 3: pt to complete UB dressing with CGA and LB dressing with min A. Short term goal 4: pt to complete hygiene/grooming with HERNANDEZ seated at EOB with no VC. Short term goal 5: Pt to tolerate sitting at EOB x15 minutes with SBA. Long term goals  Time Frame for Long term goals : 4 weeks  Long term goal 1: pt to complete sliding board transfers with HERNANDEZ. Long term goal 2: Pt to self propel x100 feet with SUP. Long term goal 3: Pt to complete UB dressing with HERNANDEZ and LB dressing with SBA  Long term goal 4: Pt to complete bathing with min A. Long term goal 5: Pt to demo ability to complete visual scanning with no verbal cuing. Therapy Time   Individual Co-treatment   Time In  125   Time Out  239   Minutes   74     This note serves as a DC summary in the event of pt discharge.    LUISITO Khalil/L

## 2022-02-03 NOTE — FLOWSHEET NOTE
02/03/22 0900   Assessment   Charting Type Shift assessment   Neurological   Neuro (WDL) X   Level of Consciousness Alert (0)   Orientation Level Oriented X4   Cognition Appropriate judgement   Language Clear;Paraphasic errors   Size R Pupil (mm) 3   R Pupil Shape Round   R Pupil Reaction Brisk   Size L Pupil (mm) 3   L Pupil Shape Round   L Pupil Reaction Brisk   R Hand  Absent   L Hand  Strong   R Foot Dorsiflexion Absent   L Foot Dorsiflexion Strong   R Foot Plantar Flexion Absent   L Foot Plantar Flexion Strong   RUE Motor Response Flaccid   Sensation RUE Decreased   LUE Motor Response Responds to command;Normal extension;Normal flexion   Sensation LUE Full sensation   RLE Motor Response Flaccid   Sensation RLE Tingling   LLE Motor Response Responds to command   Sensation LLE Full sensation   Gag Present   Tongue Deviation None   NIHSS Stroke Scale Assessed No   Swallow Screening   Is the patient able to remain alert for testing? Yes   Was the Patient Eating a Modified Diet Prior to being Admitted? No   [place] Coma Scale   Eye Opening 4   Best Verbal Response 5   Best Motor Response 6   Karmen Coma Scale Score 15   HEENT   HEENT (WDL) WDL   Respiratory   Respiratory (WDL) WDL   Respiratory Pattern Regular   Respiratory Depth Normal   Respiratory Quality/Effort Unlabored   Chest Assessment Chest expansion symmetrical   L Breath Sounds Clear   R Breath Sounds Clear   Breath Sounds   Right Upper Lobe Clear   Right Middle Lobe Clear   Right Lower Lobe Diminished   Left Upper Lobe Clear   Left Lower Lobe Diminished   Cardiac   Cardiac (WDL) WDL   Cardiac Monitor   Telemetry Monitor On No   Gastrointestinal   Abdominal (WDL) X   RUQ Bowel Sounds Active   LUQ Bowel Sounds Active   RLQ Bowel Sounds Active   LLQ Bowel Sounds Active   Abdomen Inspection Distended;Rounded; Soft   Tenderness Soft   Peripheral Vascular   Peripheral Vascular (WDL) WDL   Edema None   Skin Color/Condition   Skin Color/Condition (WDL) WDL   Skin Integrity   Skin Integrity (WDL) WDL   Musculoskeletal   Musculoskeletal (WDL) X   RUE Flaccid   LUE Full movement   RL Extremity Flaccid   LL Extremity Full movement   Genitourinary   Genitourinary (WDL) X   Anus/Rectum   Anus/Rectum (WDL) WDL   Urethral Catheter 16 fr   Placement Date/Time: 01/28/22 1840   Tube Size (fr): 16 fr  Catheter Balloon Size: 10 mL  Collection Container: Standard  Securement Method: Securing device (Describe)  Urine Returned: (c) Yes   Catheter Indications Urinary retention (acute or chronic), continuous bladder irrigation or bladder outlet obstruction   Site Assessment Pink   Urine Color Yellow   Urine Appearance Cloudy   Psychosocial   Psychosocial (WDL) WDL   Pt alert  Able to take am medications well with assist with water- No complaints of pain Heel protector RLE- pillow support right arm- call bell within reach - Will monitor

## 2022-02-04 PROBLEM — G93.40 ACUTE ENCEPHALOPATHY: Status: ACTIVE | Noted: 2022-02-04

## 2022-02-04 PROBLEM — N39.0 UTI (URINARY TRACT INFECTION): Status: ACTIVE | Noted: 2022-02-04

## 2022-02-04 LAB
GLUCOSE BLD-MCNC: 130 MG/DL (ref 74–106)
GLUCOSE BLD-MCNC: 138 MG/DL (ref 74–106)
GLUCOSE BLD-MCNC: 140 MG/DL (ref 74–106)
GLUCOSE BLD-MCNC: 233 MG/DL (ref 74–106)
PERFORMED ON: ABNORMAL

## 2022-02-04 PROCEDURE — 92507 TX SP LANG VOICE COMM INDIV: CPT

## 2022-02-04 PROCEDURE — 97110 THERAPEUTIC EXERCISES: CPT

## 2022-02-04 PROCEDURE — 6360000002 HC RX W HCPCS: Performed by: PHYSICIAN ASSISTANT

## 2022-02-04 PROCEDURE — 97112 NEUROMUSCULAR REEDUCATION: CPT

## 2022-02-04 PROCEDURE — 97140 MANUAL THERAPY 1/> REGIONS: CPT

## 2022-02-04 PROCEDURE — 1200000002 HC SEMI PRIVATE SWING BED

## 2022-02-04 PROCEDURE — 6370000000 HC RX 637 (ALT 250 FOR IP): Performed by: PHYSICIAN ASSISTANT

## 2022-02-04 PROCEDURE — 99309 SBSQ NF CARE MODERATE MDM 30: CPT | Performed by: INTERNAL MEDICINE

## 2022-02-04 PROCEDURE — 51798 US URINE CAPACITY MEASURE: CPT

## 2022-02-04 PROCEDURE — 97530 THERAPEUTIC ACTIVITIES: CPT

## 2022-02-04 PROCEDURE — 6370000000 HC RX 637 (ALT 250 FOR IP): Performed by: INTERNAL MEDICINE

## 2022-02-04 PROCEDURE — 2580000003 HC RX 258: Performed by: PHYSICIAN ASSISTANT

## 2022-02-04 RX ORDER — AMLODIPINE BESYLATE 5 MG/1
5 TABLET ORAL ONCE
Status: COMPLETED | OUTPATIENT
Start: 2022-02-04 | End: 2022-02-04

## 2022-02-04 RX ORDER — CARVEDILOL 6.25 MG/1
6.25 TABLET ORAL 2 TIMES DAILY WITH MEALS
Status: DISCONTINUED | OUTPATIENT
Start: 2022-02-04 | End: 2022-02-07

## 2022-02-04 RX ORDER — AMLODIPINE BESYLATE 5 MG/1
10 TABLET ORAL DAILY
Status: DISCONTINUED | OUTPATIENT
Start: 2022-02-05 | End: 2022-02-14 | Stop reason: HOSPADM

## 2022-02-04 RX ADMIN — METFORMIN HYDROCHLORIDE 500 MG: 500 TABLET ORAL at 08:15

## 2022-02-04 RX ADMIN — LISINOPRIL 20 MG: 20 TABLET ORAL at 08:16

## 2022-02-04 RX ADMIN — METFORMIN HYDROCHLORIDE 500 MG: 500 TABLET ORAL at 17:32

## 2022-02-04 RX ADMIN — AMLODIPINE BESYLATE 5 MG: 5 TABLET ORAL at 10:39

## 2022-02-04 RX ADMIN — ALOGLIPTIN 25 MG: 12.5 TABLET, FILM COATED ORAL at 08:16

## 2022-02-04 RX ADMIN — Medication 1 TABLET: at 08:15

## 2022-02-04 RX ADMIN — CARVEDILOL 6.25 MG: 6.25 TABLET, FILM COATED ORAL at 08:15

## 2022-02-04 RX ADMIN — CEFTRIAXONE 1000 MG: 1 INJECTION, POWDER, FOR SOLUTION INTRAMUSCULAR; INTRAVENOUS at 08:16

## 2022-02-04 RX ADMIN — Medication 2 UNITS: at 17:29

## 2022-02-04 RX ADMIN — ZINC SULFATE 220 MG (50 MG) CAPSULE 50 MG: CAPSULE at 08:16

## 2022-02-04 RX ADMIN — CARVEDILOL 6.25 MG: 6.25 TABLET, FILM COATED ORAL at 17:32

## 2022-02-04 RX ADMIN — OXYCODONE HYDROCHLORIDE AND ACETAMINOPHEN 500 MG: 500 TABLET ORAL at 08:15

## 2022-02-04 RX ADMIN — AMLODIPINE BESYLATE 5 MG: 5 TABLET ORAL at 08:16

## 2022-02-04 RX ADMIN — Medication 10 UNITS: at 20:25

## 2022-02-04 RX ADMIN — ASPIRIN 325 MG ORAL TABLET 325 MG: 325 PILL ORAL at 08:15

## 2022-02-04 RX ADMIN — ENOXAPARIN SODIUM 40 MG: 100 INJECTION SUBCUTANEOUS at 20:21

## 2022-02-04 RX ADMIN — PANTOPRAZOLE SODIUM 40 MG: 40 TABLET, DELAYED RELEASE ORAL at 06:29

## 2022-02-04 RX ADMIN — ATORVASTATIN CALCIUM 80 MG: 80 TABLET, FILM COATED ORAL at 20:21

## 2022-02-04 RX ADMIN — SODIUM CHLORIDE: 9 INJECTION, SOLUTION INTRAVENOUS at 03:13

## 2022-02-04 ASSESSMENT — PAIN SCALES - GENERAL: PAINLEVEL_OUTOF10: 0

## 2022-02-04 NOTE — FLOWSHEET NOTE
02/04/22 0013   Assessment   Charting Type Shift assessment   Neurological   Neuro (WDL) X   Level of Consciousness Alert (0)   Orientation Level Oriented X4   Cognition Appropriate judgement   Language Clear;Paraphasic errors   Size R Pupil (mm) 3   R Pupil Shape Round   R Pupil Reaction Brisk   Size L Pupil (mm) 3   L Pupil Shape Round   L Pupil Reaction Brisk   R Hand  Absent   L Hand  Strong   R Foot Dorsiflexion Absent   L Foot Dorsiflexion Strong   R Foot Plantar Flexion Absent   L Foot Plantar Flexion Strong   RUE Motor Response Flaccid   Sensation RUE Decreased   LUE Motor Response Responds to command;Normal extension;Normal flexion   Sensation LUE Full sensation   RLE Motor Response Flaccid   Sensation RLE Tingling   LLE Motor Response Responds to command   Sensation LLE Full sensation   Gag Present   Tongue Deviation None   NIHSS Stroke Scale Assessed No   Swallow Screening   Is the patient able to remain alert for testing? Yes   Was the Patient Eating a Modified Diet Prior to being Admitted?  No   Karmen Coma Scale   Eye Opening 4   Best Verbal Response 5   Best Motor Response 6   Karmen Coma Scale Score 15   HEENT   HEENT (WDL) WDL   Respiratory   Respiratory (WDL) WDL   Respiratory Pattern Regular   Respiratory Depth Normal   Respiratory Quality/Effort Unlabored   Chest Assessment Chest expansion symmetrical   L Breath Sounds Clear   R Breath Sounds Clear   Breath Sounds   Right Upper Lobe Clear   Right Middle Lobe Clear   Right Lower Lobe Diminished   Left Upper Lobe Clear   Left Lower Lobe Diminished   Cardiac   Cardiac (WDL) WDL   Cardiac Monitor   Telemetry Monitor On No   Gastrointestinal   Abdominal (WDL) X   RUQ Bowel Sounds Active   LUQ Bowel Sounds Active   RLQ Bowel Sounds Active   LLQ Bowel Sounds Active   Abdomen Inspection Rounded   Tenderness Soft   Peripheral Vascular   Peripheral Vascular (WDL) WDL   Edema None   Skin Color/Condition   Skin Color/Condition (WDL) WDL   Skin Integrity   Skin Integrity (WDL) WDL   Musculoskeletal   Musculoskeletal (WDL) X   RUE Flaccid   LUE Full movement   RL Extremity Flaccid   LL Extremity Full movement   Genitourinary   Genitourinary (WDL) X   Urethral Catheter 16 fr   Placement Date/Time: 01/28/22 1840   Tube Size (fr): 16 fr  Catheter Balloon Size: 10 mL  Collection Container: Standard  Securement Method: Securing device (Describe)  Urine Returned: (c) Yes   Catheter Indications Urinary retention (acute or chronic), continuous bladder irrigation or bladder outlet obstruction   Urine Color Yellow   Psychosocial   Psychosocial (WDL) WDL

## 2022-02-04 NOTE — PROGRESS NOTES
[facility]  SPEECH/LANGUAGE PATHOLOGY   INPATIENT DAILY NOTE      [x] Daily           [] Discharge    Patient:Noreen Gallardo      :1965  PZO:[id number]  Rehab Dx/Hx: Pneumonia due to COVID-19 virus [U07.1, J12.82]  Declining functional status [R53.81]   No Known Allergies  Precautions: Restrictions/Precautions: General Precautions,Isolation,Fall Risk,Contact Precautions     Home Situation/IADL:   Social/Functional History  Lives With: Spouse  Type of Home: House  Home Layout: Two level,Able to Live on Main level with bedroom/bathroom  Home Access: Stairs to enter without rails  Entrance Stairs - Number of Steps: 2  Bathroom Shower/Tub: Tub/Shower unit,Walk-in shower  Bathroom Toilet: Standard  Bathroom Equipment: Grab bars in shower  ADL Assistance: [address] [place] [address] Avenue: Independent  Homemaking Responsibilities: Yes  Ambulation Assistance: Independent  Transfer Assistance: Independent  Active : Yes  Date of Admit: 2022  Room #: G104/G104-01      Number of Minutes/Billable Intervention  Cog/Memory Deficits     Aphasia/Language     Dysarthria/Speech 30   Apraxia/Speech     Dysphagia/Swallowing     Group     Other    TOTAL Minutes Billed  30    Variance          Date: 2022  Day of ARU Week:  1       SLP Individual Minutes  Time In: 1720  Time Out: 1750  Minutes: 30 [place] in:    [place] out-   [place] total-      Variance/Reason:  [] Refusal due to   [] Medical hold/reason  [] Illness   [] Off Unit for test/procedure  [] Extra time needed to complete task  [] Other (specify)    Activity completed: Patient upright in bed, pleasant and agreeable to ST tx. Patient reported feeling better today. Completed convergent and divergent naming tasks with 60% accuracy with min cues provided; increased performance indicated this date. Continue current POC. Pain: None reported or indicated  Current Diet: ADULT DIET; Regular; 4 carb choices (60 gm/meal);  Low Fat/Low Chol/High Fiber/JARROD; Low Sodium (2 gm)  ADULT ORAL NUTRITION SUPPLEMENT; Breakfast, Lunch, Dinner; Diabetic Oral Supplement  Subjective: Patient upright in bed, pleasant and agreeable to ST eval.       Goals and POC: Co-treats where appropriate with PT or OT to facilitate patient goals in functional tasks. LTG Goal 1: Patient will demonstrate with increased speech intelligibility for increased communcation with wants/needs 100% accuracy    Timeframe for Long-term Goals: 4 weeks      Short-term Goals  Timeframe for Short-term Goals: 2 weeks  Goal 1: Patient will peform oral motor exercises for increased speech intelligibiltity 20/20 x  Goal 2: Patient will demonstrate with increased speech intelligibility for increased communication with wants/needs 90% accuracy  Goal 3: Patient will complete word finding tasks with 80% accuracy over consecutive sessions to improve overall expressive communication skills. Short-term Goals  Timeframe for Short-term Goals: ST eval only          Alarm placed: []bed [x]chair   []other:          Barriers to progress:   [] Fatigue        [] Cognitive Deficits   [] Memory Deficits   [] Reduced Attn   [] Self Limiting Behaviors    [] Reduced insight/awareness     [x] Visual Deficits   [] Premorbid Conditions   [] Impulsivity     [] Other      Education/Interventions used this date: Aspiration precaution guidelines/safe swallow strategies   [x] Progress was updated and reviewed with patient and/or family this date.       Interventions used this date:  [x] Speech/Language Treatment    [] Instruction in HEP    Group   [] Dysphagia Treatment   [] Cognitive Skill Imtiaz    Other:         Assessment / Impression                                                          Treatment/Activity Tolerance:   [x] Tolerated Treatment well:     [] Patient limited by fatigue/pain:       [] Patient limited by medical complications:    [] Adverse Reaction to Tx:   [] Significant change in status:      Electronically Signed by  Josep Jolly MS, CCC-SLP    2/4/2022  6:31 PM

## 2022-02-04 NOTE — PROGRESS NOTES
Occupational Therapy  Daily Note  Date: 2022   Patient Name: Lisa Kumar  MRN: [id number]     : 1965    Date of Service: 2022  Discharge Recommendations:  Continue to assess pending progress    OT Equipment Recommendations: Will assess needs closer to DC pending progress: Att, pt will need WC, BSC, Bed rail    Assessment  Performance deficits / Impairments: Decreased functional mobility ; Decreased ADL status; Decreased ROM; Decreased strength;Decreased endurance;Decreased balance;Decreased coordination;Decreased posture;Decreased high-level IADLs;Decreased fine motor control;Decreased vision/visual deficit    Pt seen for skilled OT interventions; no pain reported; co tx w PTA. Sittting Edge of chair, pt required CGA due to balance deficits w verbal cues to utilize unaffected side for upright balance support. Pt able to perform sit to stand w Max Ax1. During tasks, pt presents w R sided hemiplegia w fears of falling. Pt tolerated tx well w increased fatigue during activity. No volitional movement in RUE noted this date. OT completed hand hugs & jt compressions for body awareness to R side offering proprioceptive inputs. OT led UE therex, pt tolerated well. RUE elevated & positioned on pillow, boot placed on RLE foot, lift pad placed underneath pt for return to bed at later requested time- nursing staff notified. Pt in bedside chair w pressure alarm, call bell in reach, all needs met. Patient Diagnosis(es): There were no encounter diagnoses. has a past medical history of Diabetes mellitus type 2 with complications ([address] History of cerebrovascular accident (CVA) with residual deficit, and HTN (hypertension). has no past surgical history on file. Restrictions  Restrictions/Precautions: General Precautions,Isolation,Fall Risk,Contact Precautions  Required Braces or Orthoses?: No    Subjective  Subjective: Pt agreeable to OT services. Pt reports no pain & feeling well.    Patient Currently in Pain: Denies    Objective  Orientation Level: Oriented to person;Oriented to situation;Disoriented to time;Oriented to place  Observation: Pt in bedside chair, NAD, RA, Pleasant & cooperative    Posture: Fair  Sitting balance: Contact guard assistance  Sit to stand: Maximum assistance x1    Cognition: Pt able to follow one step commands, difficulty with multi step commands, STM appears intact, expressive aphasia    RUE hand hugs & jt compressions     RUE PROM x15  Shoulder flexion/extension  Shoulder add/abduction  Horizontal add/abduction  Internal/external rotation  Elbow flexion/extension  Forearm supination/pronation  Wrist flexion/extension    LUE AROM x15  Shoulder flexion/extension  Shoulder add/abduction  Horizontal add/abduction  Internal/external rotation  Elbow flexion/extension  Forearm supination/pronation  Wrist flexion/extension    Plan  Times per week: 3-5  Times per day: Daily  Plan weeks: 2-4  Current Treatment Recommendations: Strengthening,ROM,Balance Training,Functional Mobility Training,Endurance Training,Equipment Evaluation, Education, & procurement,Neuromuscular Re-education,Self-Care / ADL,Patient/Caregiver Education & Training,Safety Education & Training,Positioning    Goals  Short term goals  Time Frame for Short term goals: 2 weeks  Short term goal 1: pt to complete bed mobility with MOD I. Short term goal 2: pt to complete sliding board transfer with min A. Short term goal 3: pt to complete UB dressing with CGA and LB dressing with min A. Short term goal 4: pt to complete hygiene/grooming with HERNANDEZ seated at EOB with no VC. Short term goal 5: Pt to tolerate sitting at EOB x15 minutes with SBA. Long term goals  Time Frame for Long term goals : 4 weeks  Long term goal 1: pt to complete sliding board transfers with HERNANDEZ. Long term goal 2: Pt to self propel x100 feet with SUP.   Long term goal 3: Pt to complete UB dressing with HERNANDEZ and LB dressing with SBA  Long term goal 4: Pt to complete bathing with min A. Long term goal 5: Pt to demo ability to complete visual scanning with no verbal cuing. Therapy Time   Individual Co-treatment   Time In  255   Time Out  349   Minutes   54     This note serves as a DC summary in the event of pt discharge.    Stefani Moreland, OTR/L

## 2022-02-04 NOTE — PLAN OF CARE
Problem: Mobility - Impaired:  Goal: Mobility will improve  Description: Mobility will improve  Outcome: Met This Shift

## 2022-02-04 NOTE — FLOWSHEET NOTE
02/04/22 0822   Assessment   Charting Type Shift assessment   Neurological   Neuro (WDL) X   Level of Consciousness Alert (0)   Orientation Level Oriented X4   Cognition Appropriate judgement   Language Clear;Paraphasic errors   Size R Pupil (mm) 3   R Pupil Shape Round   R Pupil Reaction Brisk   Size L Pupil (mm) 3   L Pupil Shape Round   L Pupil Reaction Brisk   R Hand  Absent   L Hand  Strong   R Foot Dorsiflexion Absent   L Foot Dorsiflexion Strong   R Foot Plantar Flexion Absent   L Foot Plantar Flexion Strong   RUE Motor Response Flaccid   Sensation RUE Decreased   LUE Motor Response Responds to command;Normal extension;Normal flexion   Sensation LUE Full sensation   RLE Motor Response Flaccid   Sensation RLE Tingling   LLE Motor Response Responds to command   Sensation LLE Full sensation   Gag Present   [place] Coma Scale   Eye Opening 4   Best Verbal Response 5   Best Motor Response 6   [place] Coma Scale Score 15   HEENT   HEENT (WDL) WDL   Respiratory   Respiratory (WDL) WDL   Respiratory Pattern Regular   Respiratory Depth Normal   Respiratory Quality/Effort Unlabored   Chest Assessment Chest expansion symmetrical   L Breath Sounds Clear   R Breath Sounds Clear   Breath Sounds   Right Upper Lobe Clear   Right Middle Lobe Clear   Right Lower Lobe Diminished   Left Upper Lobe Clear   Left Lower Lobe Diminished   Cardiac   Cardiac (WDL) WDL   Cardiac Monitor   Telemetry Monitor On No   Gastrointestinal   Abdominal (WDL) X   RUQ Bowel Sounds Active   LUQ Bowel Sounds Active   RLQ Bowel Sounds Active   LLQ Bowel Sounds Active   Abdomen Inspection Rounded   Tenderness Soft   Peripheral Vascular   Peripheral Vascular (WDL) WDL   Edema None   Skin Color/Condition   Skin Color/Condition (WDL) WDL   Skin Integrity   Skin Integrity (WDL) WDL   Musculoskeletal   Musculoskeletal (WDL) X   RUE Flaccid   LUE Full movement   RL Extremity Flaccid   LL Extremity Full movement   Genitourinary   Genitourinary (WDL) X   Urethral Catheter 16 fr   Placement Date/Time: 01/28/22 3740   Tube Size (fr): 16 fr  Catheter Balloon Size: 10 mL  Collection Container: Standard  Securement Method: Securing device (Describe)  Urine Returned: (c) Yes   Catheter Indications Urinary retention (acute or chronic), continuous bladder irrigation or bladder outlet obstruction   Urine Color Yellow   Psychosocial   Psychosocial (WDL) WDL

## 2022-02-04 NOTE — CARE COORDINATION
PA and CM spoke with family about POC. Family is agreeable for referral to be sent to [facility] for inpatient acute rehab referral.  Referral faxed. CM to monitor.

## 2022-02-04 NOTE — PROGRESS NOTES
Physical Therapy  Facility/Department: [facility] MED SURG  Daily Treatment Note  NAME: Noreen Mckenna  : 1965  MRN: [id number]    Date of Service: 2022    Discharge Recommendations:  Continue to assess pending progress      Assessment   Assessment: Cotreated with OT. Patient up in recliner today. Focused on L LE therex and R LE PROM. Also worked on patient's sitting balance and trunk control with her sitting up at the edge of the recliner and not holding on. Patient able to slightly lateral lean side to side without losing balance. Stood with Max A of 1 for cushion and lift pad to be placed under patient. Donned R heel protector boot to keep ankle/foot in neutral position. REQUIRES PT FOLLOW UP: Yes  Activity Tolerance  Activity Tolerance: Patient Tolerated treatment well     Patient Diagnosis(es): There were no encounter diagnoses. has a past medical history of Diabetes mellitus type 2 with complications ([address] History of cerebrovascular accident (CVA) with residual deficit, and HTN (hypertension). has no past surgical history on file. Restrictions  Restrictions/Precautions  Restrictions/Precautions: General Precautions,Isolation,Fall Risk,Contact Precautions  Required Braces or Orthoses?: No  Subjective   General  Chart Reviewed: Yes  Response To Previous Treatment: Patient with no complaints from previous session. Family / Caregiver Present: No  Subjective  Subjective: Patient reports she may go to [facility] next week if they accept her.   Pain Screening  Patient Currently in Pain: Denies  Vital Signs  Patient Currently in Pain: Denies       Objective      Transfers  Sit to Stand: Maximum Assistance  Stand to sit: Maximum Assistance     Balance  Posture: Fair  Sitting - Static: Fair  Sitting - Dynamic: Fair;-  Standing - Static: Poor  Exercises  Hip Flexion: 2x10 LLE  Hip Abduction: 2x10 LLE  Knee Long Arc Quad: 2x10 LLE  Ankle Pumps: 2x10 LLE  Comments: PROM for R LE Goals  Long term goals  Time Frame for Long term goals : 4 weeks  Long term goal 1: Patient to achieve bed mobility with mod assist x 1. Long term goal 2: Patient to transfer supine to sit and sit to supine with mod assist x 1. Long term goal 3: Patient to transfer bed to chair with sliding board with mod assist x 1. Long term goal 4: Patient to achieve Fair + static sitting balance. Plan    Plan  Times per week: 4-5 x week  Plan weeks: 4 weeks  Current Treatment Recommendations: Strengthening,Neuromuscular Re-education,Home Exercise Program,Safety Education & Training,Patient/Caregiver Education & Training,Manual Therapy - Soft Tissue Mobilization,Manual Therapy - Joint Manipulation,Functional Mobility Training,Transfer Training,ADL/Self-care Training,Balance Training  Safety Devices  Type of devices: Left in chair,Chair alarm in place,Call light within reach     Therapy Time   Individual Concurrent Group Co-treatment   Time In 1453         Time Out 7266         Minutes 56              This note serves as D/C summary if patient is discharged prior to next visit.   Constance Vazquez, PTA

## 2022-02-04 NOTE — PROGRESS NOTES
Progress Note      Subjective:   Chief complaint:   Declining functional status    Interval History:   Pt seen and examined multiple times this week. Urine in silva collection bag noted to be cris colored early in the week. IVFs were added and pt was encouraged to increase po intake. On Thursday, 2/3, pt noted to be confused and speaking gibberish. Also had an episode of increased confusion overnight. CT head repeated with no significant change. Silva exchanged. UA c/w UTI. Rocephin started. MS improved shortly thereafter. Word finding difficulty also continues to improve. Pt also noted to have right sided partial hemianopia on exam. Right sided hemiplegia persists. Review of systems:   Constitutional:  Denies fever or chills. Eyes:  Denies change in visual acuity or discharge. HENT:  Denies nasal congestion or sore throat. Respiratory:  Denies cough or shortness of breath. Cardiovascular:  Denies chest pain, palpitation or swelling in LEs. GI:  Denies abdominal pain, nausea, vomiting, bloody stools or diarrhea. :  Denies dysuria or frequency. Musculoskeletal:  Denies back pain or joint pain. Integument:  Denies rash or itching. Neurologic:  Denies headache or sensory changes. Positive for right sided hemiplegia, right sided partial hemianopia and word finding difficulty (improved). Endocrine:  Denies polyuria or polydipsia. Lymphatic:  Denies swollen glands or night sweats. Psychiatric:  Denies depression or anxiety. Past medical history, surgical history, family history and social history reviewed and unchanged compared to H&P earlier this admission.     Medications:   Scheduled Meds:   carvedilol  6.25 mg Oral BID WC    [START ON 2/5/2022] amLODIPine  10 mg Oral Daily    amLODIPine  5 mg Oral Once    cefTRIAXone (ROCEPHIN) IV  1,000 mg IntraVENous Daily    insulin glargine  10 Units SubCUTAneous Nightly    pantoprazole  40 mg Oral QAM AC    vitamin D  50,000 Units Oral Weekly    zinc sulfate  50 mg Oral Daily    ascorbic acid  500 mg Oral Daily    multivitamin  1 tablet Oral Daily    alogliptin  25 mg Oral Daily    aspirin  325 mg Oral Daily    atorvastatin  80 mg Oral Nightly    metFORMIN  500 mg Oral BID     lisinopril  20 mg Oral Daily    enoxaparin  40 mg SubCUTAneous Daily    insulin lispro  0-6 Units SubCUTAneous TID     insulin lispro  0-3 Units SubCUTAneous Nightly     Continuous Infusions:   sodium chloride 100 mL/hr at 02/04/22 0313    dextrose         Objective:     Vital Signs  Temp: 97.9 °F (36.6 °C)  Pulse: 79  Resp: 18  BP: (!) 170/86  SpO2: 94 %  O2 Device: None (Room air)       Vital signs reviewed in electronic charts. Physical exam  Constitutional:  Well developed, well nourished, thin lady laying reclined in bed in no acute distress. Eyes:  conjunctiva normal, no scleral icterus. Right sided hemiopia. HENT:  Atraumatic, external ears normal, external nose/nares normal, oropharynx moist, no pharyngeal exudates. Neck:  Supple. No JVD or thyromegaly. Respiratory:  No respiratory distress, normal breath sounds, no rales, no wheezing. Cardiovascular:  Normal rate, normal rhythm, no murmurs, no gallops, no rubs. GI:  Soft, nondistended, normal bowel sounds, nontender, no organomegaly, no mass. Musculoskeletal:  No edema, no tenderness, no obvious deformities. Full function of LUE and LLE. Right sided hemiplegia. Integument:  Well hydrated, no rash. Lymphatic:  No cervical or axillary lymphadenopathy noted. Neurologic:  Alert & oriented x 3. Right sided hemiplegia. Right sided partial hemianopia. Normal sensation RUE and RLE. Slight word finding difficulty. Speech slightly slurred. Psychiatric:  Speech slightly slurred. Behavior appropriate.     Results:     Lab Results   Component Value Date    WBC 6.9 02/02/2022    HGB 15.1 02/02/2022    HCT 43.3 02/02/2022    MCV 90.6 02/02/2022     02/02/2022       Lab Results   Component Value Date  02/02/2022    K 3.7 02/02/2022     02/02/2022    CO2 20 02/02/2022    BUN 17 02/02/2022    CREATININE <0.5 02/02/2022    GLUCOSE 135 02/02/2022    CALCIUM 8.6 02/02/2022        Assessment and Plan: Active Hospital Problems    Diagnosis Date Noted    Acute encephalopathy  -episode of acute encephalopathy on 2/3; CT head repeated 2/3 with no acute intracranial abnormality. Evidence of prior infarction in the posterior limb of the left internal capsule with extension into the medial left temporal lobe. Recommend correlation with outside CT from 1/26/22 (not available for review in the system). -per OSH records, initial CT head unremarkable  -UA repeated with evidence of UTI; MS improved after initiation of Rocephin  -reviewed CT findings from 2/3 with [facility]). CVA expansion noted is likely reflective of completion of the recent CVA and not new CVA, since pt's MS have improved with tx of UTI.    UTI (urinary tract infection) [N39.0]  -UA 2/3 c/w UTI; UCx pending. Started Rocephin with improvement of MS. Will await UCx and make adjustments if needed. 02/04/2022    Acute CVA (cerebrovascular accident) [address] [address] [I63.9]  -Dx with COVID on 1/24. Vaccinated + booster.  -presented to OSH on 1/26 with right sided weakness, dysarthria and profound HTN (). Initial NIHSS unclear.  -per OSH records, initial CT head 1/26 was unremarkable. MRI brain w/o contrast revealed an acute left basal ganglia CVA. CTA H/N neg for LVO or significant stenosis. Repeat CT head demonstrated a focal infarct of the left internal capsule. Teleneurology was consulted and pt was deemed outside the window for TPA. -OSH echo w/bubble study: negative, with findings of LVH c/w uncontrolled HTN  -OSH SLP, PT OT with recs for IP skilled rehab and pt was transferred here for swing bed  -PT OT following.  Pt continues to have right sided hemiplegia, right sided parital hemianopia, slight slurred speech and slight word finding difficulty. Current NIHSS 15.  -continue PT OT  -SLP following  -diet: regular, thin liquids  -continue  mg daily and lipitor 80 mg QHS per Neurology  -avoid hypotension  -will send referral to [facility] today   01/29/2022    Uncontrolled hypertension [I10]  -with HTN emergency at OSH at time of CVA on 1/26 with 's, requiring Cardene gtt. Was not taking any anti-HTN meds prior to dx of CVA. -BP fluctuating over course of admission with -170. Titrating anti-HTN regimen. Further adjustments made 2/4. Increased norvasc to 10mg daily on 2/4. Continue coreg 6.25mg BID, lisinopril 20mg daily and PRN hydralazine.  -monitor and titrate as needed. Goal -150.   01/29/2022    Type 2 diabetes mellitus ([facility] [address] [E11.9]  -newly dx (at OSH)  -A1c 12.3  -BGs controlled with current regimen of alogliptin 25mg daily, metformin 500 mg BID, lantus 10 units QHS and SSI   01/29/2022    Hypokalemia [E87.6]  -replaced  -monitor and replete PRN   01/29/2022    COVID-19 [U07.1]  -dx on 1/24; mild sxs at time of dx  -fully vaccinated + booster  -taken out of precautions on 2/2 01/29/2022    Urinary retention [R33.9]  -per [employer]: \"After a stroke, the brain may enter into a temporary acute cerebral shock phase. During this time, the urinary bladder will be in retentiondetrusor areflexia. Almost 25% of affected individuals develop acute urinary retention after a stroke. After the cerebral shock phase wears off, the bladder demonstrates detrusor hyperreflexia with coordinated urethral sphincter activity. This occurs because the [facility] is released from the cerebral inhibitory center. Patients with detrusor hyperreflexia complain of urinary frequency, urinary urgency, and urge incontinence. The treatment for the cerebral shock phase is indwelling R[place]ussen catheter placement or clean intermittent catheterization (CIC).  Detrusor hyperreflexia is treated with anticholinergic medications to facilitate bladder filling and storage. \"  -suspect due to neurogenic bladder from recent CVA; given CVA was recent, this could be temporary. Will attempt voiding trials today. If pt develops signs of urinary frequency, urinary urgency, and urge incontinence, will need to start anticholinergic medications. -monitor for retention and re-anchor silva if needed; then attempt voiding trials after a few days   01/29/2022    Declining functional status [R53.81]  -due to recent CVA with residual right sided hemiplegia, right sided partial hemianopia, slight slurred speech and slight word finding difficulty (improving) 01/28/2022     Patient was seen and examined by Dr. Larry Fleming and plan of care reviewed. Treatment plan was formulated collaboratively.       Electronically signed by ALEJANDRO Pruitt on 2/4/2022 at 9:33 AM

## 2022-02-05 LAB
A/G RATIO: 1.1 (ref 0.8–2)
ALBUMIN SERPL-MCNC: 3.3 G/DL (ref 3.4–4.8)
ALP BLD-CCNC: 102 U/L (ref 25–100)
ALT SERPL-CCNC: 32 U/L (ref 4–36)
ANION GAP SERPL CALCULATED.3IONS-SCNC: 12 MMOL/L (ref 3–16)
AST SERPL-CCNC: 25 U/L (ref 8–33)
BILIRUB SERPL-MCNC: 0.4 MG/DL (ref 0.3–1.2)
BUN BLDV-MCNC: 10 MG/DL (ref 6–20)
CALCIUM SERPL-MCNC: 9.2 MG/DL (ref 8.5–10.5)
CHLORIDE BLD-SCNC: 98 MMOL/L (ref 98–107)
CO2: 23 MMOL/L (ref 20–30)
CREAT SERPL-MCNC: <0.5 MG/DL (ref 0.4–1.2)
GFR AFRICAN AMERICAN: >59
GFR NON-AFRICAN AMERICAN: >60
GLOBULIN: 2.9 G/DL
GLUCOSE BLD-MCNC: 161 MG/DL (ref 74–106)
GLUCOSE BLD-MCNC: 183 MG/DL (ref 74–106)
GLUCOSE BLD-MCNC: 183 MG/DL (ref 74–106)
GLUCOSE BLD-MCNC: 189 MG/DL (ref 74–106)
GLUCOSE BLD-MCNC: 198 MG/DL (ref 74–106)
HCT VFR BLD CALC: 42 % (ref 37–47)
HEMOGLOBIN: 14.7 G/DL (ref 11.5–16.5)
MCH RBC QN AUTO: 31.8 PG (ref 27–32)
MCHC RBC AUTO-ENTMCNC: 35 G/DL (ref 31–35)
MCV RBC AUTO: 90.9 FL (ref 80–100)
PDW BLD-RTO: 11.6 % (ref 11–16)
PERFORMED ON: ABNORMAL
PLATELET # BLD: 235 K/UL (ref 150–400)
PMV BLD AUTO: 10.3 FL (ref 6–10)
POTASSIUM REFLEX MAGNESIUM: 3.6 MMOL/L (ref 3.4–5.1)
RBC # BLD: 4.62 M/UL (ref 3.8–5.8)
SODIUM BLD-SCNC: 133 MMOL/L (ref 136–145)
TOTAL PROTEIN: 6.2 G/DL (ref 6.4–8.3)
WBC # BLD: 10.7 K/UL (ref 4–11)

## 2022-02-05 PROCEDURE — 36415 COLL VENOUS BLD VENIPUNCTURE: CPT

## 2022-02-05 PROCEDURE — 6370000000 HC RX 637 (ALT 250 FOR IP): Performed by: PHYSICIAN ASSISTANT

## 2022-02-05 PROCEDURE — 1200000002 HC SEMI PRIVATE SWING BED

## 2022-02-05 PROCEDURE — 2580000003 HC RX 258: Performed by: PHYSICIAN ASSISTANT

## 2022-02-05 PROCEDURE — 6360000002 HC RX W HCPCS: Performed by: PHYSICIAN ASSISTANT

## 2022-02-05 PROCEDURE — 6370000000 HC RX 637 (ALT 250 FOR IP): Performed by: INTERNAL MEDICINE

## 2022-02-05 PROCEDURE — 80053 COMPREHEN METABOLIC PANEL: CPT

## 2022-02-05 PROCEDURE — 85027 COMPLETE CBC AUTOMATED: CPT

## 2022-02-05 RX ORDER — INSULIN GLARGINE 100 [IU]/ML
12 INJECTION, SOLUTION SUBCUTANEOUS NIGHTLY
Status: DISCONTINUED | OUTPATIENT
Start: 2022-02-05 | End: 2022-02-14 | Stop reason: HOSPADM

## 2022-02-05 RX ADMIN — AMLODIPINE BESYLATE 10 MG: 5 TABLET ORAL at 08:15

## 2022-02-05 RX ADMIN — ASPIRIN 325 MG ORAL TABLET 325 MG: 325 PILL ORAL at 08:15

## 2022-02-05 RX ADMIN — OXYCODONE HYDROCHLORIDE AND ACETAMINOPHEN 500 MG: 500 TABLET ORAL at 08:16

## 2022-02-05 RX ADMIN — ATORVASTATIN CALCIUM 80 MG: 80 TABLET, FILM COATED ORAL at 20:55

## 2022-02-05 RX ADMIN — Medication 1 UNITS: at 08:16

## 2022-02-05 RX ADMIN — LISINOPRIL 20 MG: 20 TABLET ORAL at 08:15

## 2022-02-05 RX ADMIN — CARVEDILOL 6.25 MG: 6.25 TABLET, FILM COATED ORAL at 17:25

## 2022-02-05 RX ADMIN — ALOGLIPTIN 25 MG: 12.5 TABLET, FILM COATED ORAL at 08:16

## 2022-02-05 RX ADMIN — METFORMIN HYDROCHLORIDE 500 MG: 500 TABLET ORAL at 08:16

## 2022-02-05 RX ADMIN — Medication 1 TABLET: at 08:15

## 2022-02-05 RX ADMIN — Medication 1 UNITS: at 20:57

## 2022-02-05 RX ADMIN — METFORMIN HYDROCHLORIDE 500 MG: 500 TABLET ORAL at 17:25

## 2022-02-05 RX ADMIN — Medication 1 UNITS: at 17:20

## 2022-02-05 RX ADMIN — CARVEDILOL 6.25 MG: 6.25 TABLET, FILM COATED ORAL at 08:15

## 2022-02-05 RX ADMIN — ZINC SULFATE 220 MG (50 MG) CAPSULE 50 MG: CAPSULE at 08:15

## 2022-02-05 RX ADMIN — Medication 12 UNITS: at 20:56

## 2022-02-05 RX ADMIN — Medication 1 UNITS: at 11:20

## 2022-02-05 RX ADMIN — PANTOPRAZOLE SODIUM 40 MG: 40 TABLET, DELAYED RELEASE ORAL at 06:10

## 2022-02-05 RX ADMIN — CEFTRIAXONE 1000 MG: 1 INJECTION, POWDER, FOR SOLUTION INTRAMUSCULAR; INTRAVENOUS at 08:28

## 2022-02-05 RX ADMIN — ENOXAPARIN SODIUM 40 MG: 100 INJECTION SUBCUTANEOUS at 20:54

## 2022-02-06 LAB
GLUCOSE BLD-MCNC: 169 MG/DL (ref 74–106)
GLUCOSE BLD-MCNC: 175 MG/DL (ref 74–106)
GLUCOSE BLD-MCNC: 234 MG/DL (ref 74–106)
GLUCOSE BLD-MCNC: 255 MG/DL (ref 74–106)
ORGANISM: ABNORMAL
PERFORMED ON: ABNORMAL
URINE CULTURE, ROUTINE: ABNORMAL

## 2022-02-06 PROCEDURE — 1200000002 HC SEMI PRIVATE SWING BED

## 2022-02-06 PROCEDURE — 2580000003 HC RX 258: Performed by: PHYSICIAN ASSISTANT

## 2022-02-06 PROCEDURE — 6360000002 HC RX W HCPCS: Performed by: PHYSICIAN ASSISTANT

## 2022-02-06 PROCEDURE — 6370000000 HC RX 637 (ALT 250 FOR IP): Performed by: PHYSICIAN ASSISTANT

## 2022-02-06 PROCEDURE — 6370000000 HC RX 637 (ALT 250 FOR IP): Performed by: INTERNAL MEDICINE

## 2022-02-06 PROCEDURE — 99309 SBSQ NF CARE MODERATE MDM 30: CPT | Performed by: INTERNAL MEDICINE

## 2022-02-06 RX ORDER — CEFDINIR 300 MG/1
300 CAPSULE ORAL EVERY 12 HOURS SCHEDULED
Status: COMPLETED | OUTPATIENT
Start: 2022-02-07 | End: 2022-02-09

## 2022-02-06 RX ADMIN — Medication 1 UNITS: at 11:45

## 2022-02-06 RX ADMIN — Medication 2 UNITS: at 17:22

## 2022-02-06 RX ADMIN — Medication 1 TABLET: at 08:06

## 2022-02-06 RX ADMIN — METFORMIN HYDROCHLORIDE 500 MG: 500 TABLET ORAL at 08:04

## 2022-02-06 RX ADMIN — ALOGLIPTIN 25 MG: 12.5 TABLET, FILM COATED ORAL at 08:05

## 2022-02-06 RX ADMIN — METFORMIN HYDROCHLORIDE 500 MG: 500 TABLET ORAL at 17:21

## 2022-02-06 RX ADMIN — ENOXAPARIN SODIUM 40 MG: 100 INJECTION SUBCUTANEOUS at 21:26

## 2022-02-06 RX ADMIN — ZINC SULFATE 220 MG (50 MG) CAPSULE 50 MG: CAPSULE at 08:04

## 2022-02-06 RX ADMIN — Medication 1 UNITS: at 08:15

## 2022-02-06 RX ADMIN — ATORVASTATIN CALCIUM 80 MG: 80 TABLET, FILM COATED ORAL at 21:26

## 2022-02-06 RX ADMIN — OXYCODONE HYDROCHLORIDE AND ACETAMINOPHEN 500 MG: 500 TABLET ORAL at 08:05

## 2022-02-06 RX ADMIN — Medication 12 UNITS: at 21:29

## 2022-02-06 RX ADMIN — CARVEDILOL 6.25 MG: 6.25 TABLET, FILM COATED ORAL at 17:21

## 2022-02-06 RX ADMIN — Medication 2 UNITS: at 21:28

## 2022-02-06 RX ADMIN — CEFTRIAXONE 1000 MG: 1 INJECTION, POWDER, FOR SOLUTION INTRAMUSCULAR; INTRAVENOUS at 08:06

## 2022-02-06 RX ADMIN — ASPIRIN 325 MG ORAL TABLET 325 MG: 325 PILL ORAL at 08:05

## 2022-02-06 RX ADMIN — LISINOPRIL 20 MG: 20 TABLET ORAL at 08:04

## 2022-02-06 RX ADMIN — AMLODIPINE BESYLATE 10 MG: 5 TABLET ORAL at 08:05

## 2022-02-06 RX ADMIN — PANTOPRAZOLE SODIUM 40 MG: 40 TABLET, DELAYED RELEASE ORAL at 08:05

## 2022-02-06 RX ADMIN — CARVEDILOL 6.25 MG: 6.25 TABLET, FILM COATED ORAL at 08:06

## 2022-02-06 ASSESSMENT — PAIN SCALES - GENERAL: PAINLEVEL_OUTOF10: 0

## 2022-02-06 NOTE — PROGRESS NOTES
Pt desat to 85% on room air while napping. Sleeps with mouth open and has history of sleep apnea. Placed on CPAP and sats 96% while asleep.

## 2022-02-06 NOTE — PROGRESS NOTES
Progress Note      Subjective:   Chief complaint:   Declining functional status    Interval History:   Pt seen and examined multiple times this week. MS improving, but still with speech intelligibility and word finding at times. Pt drinking more, so discontinued IVFs today. Also transitioned IV abx to po. Awaiting pre-cert from [facility]. Review of systems:   Constitutional:  Denies fever or chills. Eyes:  Denies change in visual acuity or discharge. HENT:  Denies nasal congestion or sore throat. Respiratory:  Denies cough or shortness of breath. Cardiovascular:  Denies chest pain, palpitation or swelling in LEs. GI:  Denies abdominal pain, nausea, vomiting, bloody stools or diarrhea. :  Denies dysuria or frequency. Musculoskeletal:  Denies back pain or joint pain. Integument:  Denies rash or itching. Neurologic:  Denies headache or sensory changes. Positive for right sided hemiplegia, right sided partial hemianopia and word finding difficulty (improved). Endocrine:  Denies polyuria or polydipsia. Lymphatic:  Denies swollen glands or night sweats. Psychiatric:  Denies depression or anxiety. Past medical history, surgical history, family history and social history reviewed and unchanged compared to H&P earlier this admission.     Medications:   Scheduled Meds:   cefdinir  300 mg Oral 2 times per day    insulin glargine  12 Units SubCUTAneous Nightly    carvedilol  6.25 mg Oral BID WC    amLODIPine  10 mg Oral Daily    pantoprazole  40 mg Oral QAM AC    vitamin D  50,000 Units Oral Weekly    zinc sulfate  50 mg Oral Daily    ascorbic acid  500 mg Oral Daily    multivitamin  1 tablet Oral Daily    alogliptin  25 mg Oral Daily    aspirin  325 mg Oral Daily    atorvastatin  80 mg Oral Nightly    metFORMIN  500 mg Oral BID WC    lisinopril  20 mg Oral Daily    enoxaparin  40 mg SubCUTAneous Daily    insulin lispro  0-6 Units SubCUTAneous TID WC    insulin lispro  0-3 Units SubCUTAneous Nightly     Continuous Infusions:   dextrose         Objective:     Vital Signs  Temp: 97.5 °F (36.4 °C)  Pulse: 70  Resp: 18  BP: (!) 145/74  SpO2: 95 %  O2 Device: None (Room air)       Vital signs reviewed in electronic charts. Physical exam  Constitutional:  Well developed, well nourished, thin lady laying reclined in bed in no acute distress. Eyes:  conjunctiva normal, no scleral icterus. Right sided hemiopia. HENT:  Atraumatic, external ears normal, external nose/nares normal, oropharynx moist, no pharyngeal exudates. Neck:  Supple. No JVD or thyromegaly. Respiratory:  No respiratory distress, normal breath sounds, no rales, no wheezing. Cardiovascular:  Normal rate, normal rhythm, no murmurs, no gallops, no rubs. GI:  Soft, nondistended, normal bowel sounds, nontender, no organomegaly, no mass. Musculoskeletal:  No edema, no tenderness, no obvious deformities. Full function of LUE and LLE. Right sided hemiplegia. Integument:  Well hydrated, no rash. Lymphatic:  No cervical or axillary lymphadenopathy noted. Neurologic:  Alert & oriented x 3. Right sided hemiplegia. Right sided partial hemianopia. Normal sensation RUE and RLE. Slight word finding difficulty. Speech slightly slurred. Psychiatric:  Speech slightly slurred. Behavior appropriate. Results:     Lab Results   Component Value Date    WBC 10.7 02/05/2022    HGB 14.7 02/05/2022    HCT 42.0 02/05/2022    MCV 90.9 02/05/2022     02/05/2022       Lab Results   Component Value Date     02/05/2022    K 3.6 02/05/2022    CL 98 02/05/2022    CO2 23 02/05/2022    BUN 10 02/05/2022    CREATININE <0.5 02/05/2022    GLUCOSE 183 02/05/2022    CALCIUM 9.2 02/05/2022        Assessment and Plan: Active Hospital Problems    Diagnosis Date Noted    UTI (urinary tract infection) [N39.0]  -UA 2/3 c/w UTI; UCx grew E.coli; sensitivities pending.   -Started Rocephin with improvement of MS on 2/3.  Transitioned to [facility] Cardene gtt. Was not taking any anti-HTN meds prior to dx of CVA. -titrated BP regimen this admission. -160 over the past 24 hours. Continue norvasc 10mg daily, coreg 6.25mg BID, lisinopril 20mg daily and PRN hydralazine.  -monitor and titrate as needed. Goal -150.   01/29/2022    Type 2 diabetes mellitus ([facility] [address] [E11.9]  -newly dx (at OSH)  -A1c 12.3  -BGs controlled with current regimen of alogliptin 25mg daily, metformin 500 mg BID, lantus 12 units QHS and SSI   01/29/2022    Hypokalemia [E87.6]  -replaced  -monitor and replete PRN   01/29/2022    COVID-19 [U07.1]  -dx on 1/24; mild sxs at time of dx  -fully vaccinated + booster  -taken out of precautions on 2/2 01/29/2022    Urinary retention [R33.9]  -per [employer]: \"After a stroke, the brain may enter into a temporary acute cerebral shock phase. During this time, the urinary bladder will be in retentiondetrusor areflexia. Almost 25% of affected individuals develop acute urinary retention after a stroke. After the cerebral shock phase wears off, the bladder demonstrates detrusor hyperreflexia with coordinated urethral sphincter activity. This occurs because the [facility] is released from the cerebral inhibitory center. Patients with detrusor hyperreflexia complain of urinary frequency, urinary urgency, and urge incontinence. The treatment for the cerebral shock phase is indwelling Silva catheter placement or clean intermittent catheterization (CIC). Detrusor hyperreflexia is treated with anticholinergic medications to facilitate bladder filling and storage. \"  -suspect due to neurogenic bladder from recent CVA; given CVA was recent, this could be temporary.   -failed voiding trial on 2/4 and silva was re-anchored  -plan to re-attempt voiding trials in 1 week  -after silva is successfully removed, pt will need to be monitored closely for signs of urinary frequency, urinary urgency, and urge incontinence.  If those sxs develop, will need to start anticholinergic medications. 01/29/2022    Declining functional status [R53.81]  -due to recent CVA with residual right sided hemiplegia, right sided partial hemianopia, slight slurred speech and slight word finding difficulty (improving) 01/28/2022     Patient was seen and examined by Dr. Roland Watters and plan of care reviewed. Treatment plan was formulated collaboratively.       Electronically signed by ALEJANDRO Luz on 2/6/2022 at 9:12 AM no

## 2022-02-06 NOTE — FLOWSHEET NOTE
02/06/22 0811   Assessment   Charting Type Shift assessment   Neurological   Neuro (WDL) X   Level of Consciousness Alert (0)   Orientation Level Oriented X4   Cognition Appropriate judgement   Language Clear;Paraphasic errors   R Hand  Absent   L Hand  Strong   R Foot Dorsiflexion Absent   L Foot Dorsiflexion Strong   R Foot Plantar Flexion Absent   L Foot Plantar Flexion Strong   RUE Motor Response Flaccid   Sensation RUE Decreased   LUE Motor Response Responds to command;Normal extension;Normal flexion   Sensation LUE Full sensation   RLE Motor Response Flaccid   Sensation RLE Tingling   LLE Motor Response Responds to command   Sensation LLE Full sensation   Gag Present   [place] Coma Scale   Eye Opening 4   Best Verbal Response 5   Best Motor Response 6   [place] Coma Scale Score 15   HEENT   HEENT (WDL) WDL   Respiratory   Respiratory (WDL) WDL   Respiratory Pattern Regular   Respiratory Depth Normal   Respiratory Quality/Effort Unlabored   Chest Assessment Chest expansion symmetrical   L Breath Sounds Clear   R Breath Sounds Clear   Breath Sounds   Right Upper Lobe Clear   Right Middle Lobe Clear   Right Lower Lobe Diminished   Left Upper Lobe Clear   Left Lower Lobe Diminished   Cardiac   Cardiac (WDL) WDL   Cardiac Monitor   Telemetry Monitor On No   Gastrointestinal   Abdominal (WDL) X   RUQ Bowel Sounds Active   LUQ Bowel Sounds Active   RLQ Bowel Sounds Active   LLQ Bowel Sounds Active   Abdomen Inspection Rounded   Tenderness Soft   Peripheral Vascular   Peripheral Vascular (WDL) WDL   Edema None   Skin Color/Condition   Skin Color/Condition (WDL) WDL   Skin Integrity   Skin Integrity (WDL) WDL   Musculoskeletal   Musculoskeletal (WDL) X   RUE Flaccid   LUE Full movement   RL Extremity Flaccid   LL Extremity Full movement   Genitourinary   Genitourinary (WDL) X  (urethral catheter)   Anus/Rectum   Anus/Rectum (WDL) WDL   Urethral Catheter Straight-tip 14 fr   Placement Date/Time: 02/04/22 7865 Urethral Catheter Timeout: Patient; Appropriate Equipment;Sterile technique  Inserted by: Casey Villafuerte RN  Catheter Type: Straight-tip  Tube Size (fr): 14 fr  Catheter Balloon Size: 10 mL  Collection Container: St... Catheter Indications Urinary retention (acute or chronic), continuous bladder irrigation or bladder outlet obstruction   Site Assessment Moist;Pink   Urine Color Yellow   Urine Appearance Clear   Psychosocial   Psychosocial (WDL) WDL   Passive ROM exercises performed on right side. Awake and alert. Ability to talk improving slightly. Right side flaccid.

## 2022-02-07 LAB
GLUCOSE BLD-MCNC: 165 MG/DL (ref 74–106)
GLUCOSE BLD-MCNC: 198 MG/DL (ref 74–106)
GLUCOSE BLD-MCNC: 208 MG/DL (ref 74–106)
GLUCOSE BLD-MCNC: 226 MG/DL (ref 74–106)
PERFORMED ON: ABNORMAL

## 2022-02-07 PROCEDURE — 97112 NEUROMUSCULAR REEDUCATION: CPT

## 2022-02-07 PROCEDURE — 6370000000 HC RX 637 (ALT 250 FOR IP): Performed by: PHYSICIAN ASSISTANT

## 2022-02-07 PROCEDURE — 97530 THERAPEUTIC ACTIVITIES: CPT

## 2022-02-07 PROCEDURE — 1200000002 HC SEMI PRIVATE SWING BED

## 2022-02-07 PROCEDURE — 97110 THERAPEUTIC EXERCISES: CPT

## 2022-02-07 PROCEDURE — 92507 TX SP LANG VOICE COMM INDIV: CPT

## 2022-02-07 PROCEDURE — 6370000000 HC RX 637 (ALT 250 FOR IP): Performed by: INTERNAL MEDICINE

## 2022-02-07 PROCEDURE — 6360000002 HC RX W HCPCS: Performed by: PHYSICIAN ASSISTANT

## 2022-02-07 RX ORDER — CARVEDILOL 12.5 MG/1
12.5 TABLET ORAL 2 TIMES DAILY WITH MEALS
Status: DISCONTINUED | OUTPATIENT
Start: 2022-02-08 | End: 2022-02-14 | Stop reason: HOSPADM

## 2022-02-07 RX ORDER — CARVEDILOL 6.25 MG/1
6.25 TABLET ORAL ONCE
Status: COMPLETED | OUTPATIENT
Start: 2022-02-07 | End: 2022-02-07

## 2022-02-07 RX ADMIN — CEFDINIR 300 MG: 300 CAPSULE ORAL at 07:48

## 2022-02-07 RX ADMIN — ENOXAPARIN SODIUM 40 MG: 100 INJECTION SUBCUTANEOUS at 20:34

## 2022-02-07 RX ADMIN — Medication 2 UNITS: at 17:10

## 2022-02-07 RX ADMIN — METFORMIN HYDROCHLORIDE 500 MG: 500 TABLET ORAL at 07:48

## 2022-02-07 RX ADMIN — PANTOPRAZOLE SODIUM 40 MG: 40 TABLET, DELAYED RELEASE ORAL at 07:48

## 2022-02-07 RX ADMIN — CEFDINIR 300 MG: 300 CAPSULE ORAL at 20:34

## 2022-02-07 RX ADMIN — ZINC SULFATE 220 MG (50 MG) CAPSULE 50 MG: CAPSULE at 07:49

## 2022-02-07 RX ADMIN — CARVEDILOL 6.25 MG: 6.25 TABLET, FILM COATED ORAL at 16:33

## 2022-02-07 RX ADMIN — CARVEDILOL 6.25 MG: 6.25 TABLET, FILM COATED ORAL at 23:25

## 2022-02-07 RX ADMIN — Medication 1 UNITS: at 11:44

## 2022-02-07 RX ADMIN — Medication 1 UNITS: at 20:44

## 2022-02-07 RX ADMIN — ASPIRIN 325 MG ORAL TABLET 325 MG: 325 PILL ORAL at 07:49

## 2022-02-07 RX ADMIN — OXYCODONE HYDROCHLORIDE AND ACETAMINOPHEN 500 MG: 500 TABLET ORAL at 07:49

## 2022-02-07 RX ADMIN — LISINOPRIL 20 MG: 20 TABLET ORAL at 07:49

## 2022-02-07 RX ADMIN — ATORVASTATIN CALCIUM 80 MG: 80 TABLET, FILM COATED ORAL at 20:34

## 2022-02-07 RX ADMIN — ALOGLIPTIN 25 MG: 12.5 TABLET, FILM COATED ORAL at 07:48

## 2022-02-07 RX ADMIN — CARVEDILOL 6.25 MG: 6.25 TABLET, FILM COATED ORAL at 07:48

## 2022-02-07 RX ADMIN — METFORMIN HYDROCHLORIDE 500 MG: 500 TABLET ORAL at 16:33

## 2022-02-07 RX ADMIN — Medication 1 TABLET: at 07:49

## 2022-02-07 RX ADMIN — Medication 1 UNITS: at 07:48

## 2022-02-07 RX ADMIN — Medication 12 UNITS: at 20:44

## 2022-02-07 RX ADMIN — AMLODIPINE BESYLATE 10 MG: 5 TABLET ORAL at 07:49

## 2022-02-07 ASSESSMENT — PAIN SCALES - GENERAL: PAINLEVEL_OUTOF10: 0

## 2022-02-07 NOTE — PROGRESS NOTES
Occupational Therapy  Facility/Department: [facility] MED SURG  Daily Treatment Note  NAME: Noreen Hernandez  : 1965  MRN: [id number]    Date of Service: 2022    Discharge Recommendations:  Continue to assess pending progress       Assessment   Assessment: Pt agreeable to OT services Co tx with PTA. Pt transferred to standing position x4 trials with min x2. Pt transferred from chair to [facility] with MOD x2 assist. Pt self propelled chair with CGA occasional min A for assisting with navigating around objects to the right side. Pt fatigued and required several rest breaks. Pt self propelled 200 feet x2. Pt returned to room requiring min assist to navigate through door way and completed squat pivot transfer with MOD x2. Pt reports significant fatigue upon resting in chair. Left with call light in reach. Patient Diagnosis(es): There were no encounter diagnoses. has a past medical history of Diabetes mellitus type 2 with complications ([address] History of cerebrovascular accident (CVA) with residual deficit, and HTN (hypertension). has no past surgical history on file. Restrictions  Restrictions/Precautions  Restrictions/Precautions: General Precautions,Isolation,Fall Risk,Contact Precautions  Required Braces or Orthoses?: No  Subjective   General  Chart Reviewed: Yes  Patient assessed for rehabilitation services?: Yes  Family / Caregiver Present: No  Referring Practitioner: Shannan Linton PA-C  Diagnosis: Declining functional status  Subjective  Subjective: Pt agreeable to OT services. Pt reports no pain. Orientation     Objective             Functional Mobility  Functional - Mobility Device: Wheelchair  Assist Level: Contact guard assistance  Functional Mobility Comments: 200 feet x2     Transfers  Stand Pivot Transfers: Moderate assistance;2 Person assistance  Sit Pivot Transfers:  Moderate assistance;2 Person assistance  Sit to stand: Minimal assistance;2 Person assistance           Plan Plan  Times per week: 3-5  Times per day: Daily  Plan weeks: 2-4  Current Treatment Recommendations: Strengthening,ROM,Balance Training,Functional Mobility Training,Endurance Training,Equipment Evaluation, Education, & procurement,Neuromuscular Re-education,Self-Care / ADL,Patient/Caregiver Education & Training,Safety Education & Training,Positioning    Goals  Short term goals  Time Frame for Short term goals: 2 weeks  Short term goal 1: pt to complete bed mobility with MOD I. Short term goal 2: pt to complete sliding board transfer with min A. Short term goal 3: pt to complete UB dressing with CGA and LB dressing with min A. Short term goal 4: pt to complete hygiene/grooming with HERNANDEZ seated at EOB with no VC. Short term goal 5: Pt to tolerate sitting at EOB x15 minutes with SBA. Long term goals  Time Frame for Long term goals : 4 weeks  Long term goal 1: pt to complete sliding board transfers with HERNANDEZ. Long term goal 2: Pt to self propel x100 feet with SUP. Long term goal 3: Pt to complete UB dressing with HERNANDEZ and LB dressing with SBA  Long term goal 4: Pt to complete bathing with min A. Long term goal 5: Pt to demo ability to complete visual scanning with no verbal cuing. Therapy Time   Individual Concurrent Group Co-treatment   Time In 0159         Time Out 9381         Minutes 57              This note serves as a DC summary in the event of pt discharge.      LUISITO Ac/L

## 2022-02-07 NOTE — PROGRESS NOTES
Physical Therapy  Facility/Department: [facility] MED SURG  Daily Treatment Note  NAME: Noreen Hernandez  : 1965  MRN: [id number]    Date of Service: 2022    Discharge Recommendations:  Continue to assess pending progress      Assessment   Assessment: Patient was seen for BID PT/cotreatment with OT. Patient stood from recliner 4x with Min A of 2 and support to block R knee. Patient required cues and Mod A of 2 for transfer to w/c. Patient able to propel w/c in the hallway with L hand and foot and cues for awareness of objects on her R side. Patient transferred back to the recliner with Mod A of 2.  REQUIRES PT FOLLOW UP: Yes  Activity Tolerance  Activity Tolerance: Patient Tolerated treatment well     Patient Diagnosis(es): There were no encounter diagnoses. has a past medical history of Diabetes mellitus type 2 with complications ([facility] [address] History of cerebrovascular accident (CVA) with residual deficit, and HTN (hypertension). has no past surgical history on file. Restrictions  Restrictions/Precautions  Restrictions/Precautions: General Precautions,Isolation,Fall Risk,Contact Precautions  Required Braces or Orthoses?: No  Subjective   General  Chart Reviewed: Yes  Response To Previous Treatment: Patient with no complaints from previous session. Family / Caregiver Present: No  Subjective  Subjective: Patient up in chair and agreeable to BID PT session.   Pain Screening  Patient Currently in Pain: Denies  Vital Signs  Patient Currently in Pain: Denies       Objective   Bed mobility  Rolling to Left: Moderate assistance  Supine to Sit: Minimal assistance  Scooting: Minimal assistance  Transfers  Sit to Stand: Minimal Assistance;2 Person Assistance  Stand to sit: Minimal Assistance;2 Person Assistance  Bed to Chair: Moderate assistance;2 Person Assistance        Balance  Posture: Fair  Sitting - Static: Fair  Sitting - Dynamic: Fair  Standing - Static: Poor  Standing - Dynamic: Poor;-  Exercises  Hip Flexion: 2x10 LLE  Hip Abduction: 2x10 LLE  Knee Long Arc Quad: 2x10 LLE  Ankle Pumps: 2x10 LLE  Comments: PROM for R LE      Goals  Long term goals  Time Frame for Long term goals : 4 weeks  Long term goal 1: Patient to achieve bed mobility with mod assist x 1. Long term goal 2: Patient to transfer supine to sit and sit to supine with mod assist x 1. Long term goal 3: Patient to transfer bed to chair with sliding board with mod assist x 1. Long term goal 4: Patient to achieve Fair + static sitting balance. Plan    Plan  Times per week: 4-5 x week  Plan weeks: 4 weeks  Current Treatment Recommendations: Strengthening,Neuromuscular Re-education,Home Exercise Program,Safety Education & Training,Patient/Caregiver Education & Training,Manual Therapy - Soft Tissue Mobilization,Manual Therapy - Joint Manipulation,Functional Mobility Training,Transfer Training,ADL/Self-care Training,Balance Training  Safety Devices  Type of devices: Left in chair,Call light within reach     Therapy Time   Individual Concurrent Group Co-treatment   Time In 1400         Time Out 5150         Minutes 56              This note serves as D/C summary if patient is discharged prior to next visit.   Carolina Mehta, PTA

## 2022-02-07 NOTE — PROGRESS NOTES
Occupational Therapy  Facility/Department: [facility] MED SURG  Daily Treatment Note  NAME: Noreen Bennett  : 1965  MRN: [id number]    Date of Service: 2022    Discharge Recommendations:  Continue to assess pending progress       Assessment   Assessment: Pt agreeable to OT services. Pt sitting upright at EOB with PTA upon entry. Pt able to self correct when positioned on RUE elbow 3 trials demo improved sitting balance. Pt come to stand with min x2 with poor control of knee. Pt able to tolerate standing ~1 minute then needed seated rest break. Pt transferred from bed to chair with MOD x2. Pt required max assist x2 for scooting in chair. Pt HERNANDEZ with lunch will attempt BID session. Pt will benefit from continued therapy services pt would benefit from acute rehab at this time. Pt left with call light in reach. Patient Diagnosis(es): There were no encounter diagnoses. has a past medical history of Diabetes mellitus type 2 with complications ([address] History of cerebrovascular accident (CVA) with residual deficit, and HTN (hypertension). has no past surgical history on file. Restrictions  Restrictions/Precautions  Restrictions/Precautions: General Precautions,Isolation,Fall Risk,Contact Precautions  Required Braces or Orthoses?: No  Subjective   General  Chart Reviewed: Yes  Patient assessed for rehabilitation services?: Yes  Family / Caregiver Present: No  Referring Practitioner: Jaci Francois PA-C  Diagnosis: Declining functional status  Subjective  Subjective: Pt agreeable to OT services. Pt reports no pain. Orientation     Objective                   Transfers  Stand Pivot Transfers:  Moderate assistance;2 Person assistance  Sit to stand: Minimal assistance;2 Person assistance        Plan   Plan  Times per week: 3-5  Times per day: Daily  Plan weeks: 2-4  Current Treatment Recommendations: Strengthening,ROM,Balance Training,Functional Mobility Training,Endurance Training,Equipment Evaluation, Education, & procurement,Neuromuscular Re-education,Self-Care / ADL,Patient/Caregiver Education & Training,Safety Education & Training,Positioning    Goals  Short term goals  Time Frame for Short term goals: 2 weeks  Short term goal 1: pt to complete bed mobility with MOD I. Short term goal 2: pt to complete sliding board transfer with min A. Short term goal 3: pt to complete UB dressing with CGA and LB dressing with min A. Short term goal 4: pt to complete hygiene/grooming with HERNANDEZ seated at EOB with no VC. Short term goal 5: Pt to tolerate sitting at EOB x15 minutes with SBA. Long term goals  Time Frame for Long term goals : 4 weeks  Long term goal 1: pt to complete sliding board transfers with HERNANDEZ. Long term goal 2: Pt to self propel x100 feet with SUP. Long term goal 3: Pt to complete UB dressing with HERNANDEZ and LB dressing with SBA  Long term goal 4: Pt to complete bathing with min A. Long term goal 5: Pt to demo ability to complete visual scanning with no verbal cuing. Therapy Time   Individual Concurrent Group Co-treatment   Time In 8621         Time Out 1200         Minutes 18              This note serves as a DC summary in the event of pt discharge.      Barbara Aden, OTR/L

## 2022-02-07 NOTE — PROGRESS NOTES
[facility]  SPEECH/LANGUAGE PATHOLOGY   INPATIENT DAILY NOTE      [x] Daily           [] Discharge    Patient:Noreen Luna      :1965  VP  Rehab Dx/Hx: Pneumonia due to COVID-19 virus [U07.1, J12.82]  Declining functional status [R53.81]   No Known Allergies  Precautions: Restrictions/Precautions: General Precautions,Isolation,Fall Risk,Contact Precautions     Home Situation/IADL:   Social/Functional History  Lives With: Spouse  Type of Home: House  Home Layout: Two level,Able to Live on Main level with bedroom/bathroom  Home Access: Stairs to enter without rails  Entrance Stairs - Number of Steps: 2  Bathroom Shower/Tub: Tub/Shower unit,Walk-in shower  Bathroom Toilet: Standard  Bathroom Equipment: Grab bars in shower  ADL Assistance: [address] [place] [address] Avenue: Independent  Homemaking Responsibilities: Yes  Ambulation Assistance: Independent  Transfer Assistance: Independent  Active : Yes  Date of Admit: 2022  Room #: G104/G104-01      Number of Minutes/Billable Intervention  Cog/Memory Deficits     Aphasia/Language     Dysarthria/Speech 30   Apraxia/Speech     Dysphagia/Swallowing     Group     Other    TOTAL Minutes Billed  30    Variance          Date: 2022  Day of ARU Week:  4       SLP Individual Minutes  Time In: 1055  Time Out: 11:25  Minutes: 30 [place] in:    [place] out-   [place] total-      Variance/Reason:  [] Refusal due to   [] Medical hold/reason  [] Illness   [] Off Unit for test/procedure  [] Extra time needed to complete task  [] Other (specify)    Activity completed: Patient upright in bed, pleasant and agreeable to ST tx. Patient pleasant and cooperative. Patient completes convergent/divergent naming tasks with Completed convergent and divergent naming tasks with 68% accuracy with min to mod verbal cues;  Patient performs oral motor exercises 20/20x, Patient presents with increased speech intelligibility, increased performance indicated this date. Patient would benefit from continued acute rehab services,     Pain: None reported or indicated  Current Diet: ADULT DIET; Regular; 4 carb choices (60 gm/meal); Low Fat/Low Chol/High Fiber/JARROD; Low Sodium (2 gm)  ADULT ORAL NUTRITION SUPPLEMENT; Breakfast, Lunch, Dinner; Diabetic Oral Supplement     Subjective: Patient upright in bed , expresses that Speech therapy makes her nervous  Goals and POC: Co-treats where appropriate with PT or OT to facilitate patient goals in functional tasks. , Patient would benefit from continued acute rehab services. LTG Goal 1: Patient will demonstrate with increased speech intelligibility for increased communcation with wants/needs 100% accuracy    Timeframe for Long-term Goals: 4 weeks      Short-term Goals  Timeframe for Short-term Goals: 2 weeks  Goal 1: Patient will peform oral motor exercises for increased speech intelligibiltity 20/20 x  Goal 2: Patient will demonstrate with increased speech intelligibility for increased communication with wants/needs 90% accuracy  Goal 3: Patient will complete word finding tasks with 80% accuracy over consecutive sessions to improve overall expressive communication skills. Alarm placed: [x]bed []chair   []other:          Barriers to progress:   [] Fatigue        [] Cognitive Deficits   [] Memory Deficits   [] Reduced Attn   [] Self Limiting Behaviors    [] Reduced insight/awareness     [x] Visual Deficits   [] Premorbid Conditions   [] Impulsivity     [x] Other      Education/Interventions used this date: Aspiration precaution guidelines/safe swallow strategies   [x] Progress was updated and reviewed with patient and/or family this date.       Interventions used this date:  [x] Speech/Language Treatment    [] Instruction in HEP    Group   [] Dysphagia Treatment   [] Cognitive Skill Imtiaz    Other:         Assessment / Impression Treatment/Activity Tolerance:   [x] Tolerated Treatment well:     [] Patient limited by fatigue/pain:       [] Patient limited by medical complications:    [] Adverse Reaction to Tx:   [] Significant change in status:

## 2022-02-07 NOTE — FLOWSHEET NOTE
02/07/22 0751   Assessment   Charting Type Shift assessment   Neurological   Neuro (WDL) X   Level of Consciousness Alert (0)   Orientation Level Oriented X4   Language Clear;Paraphasic errors   R Hand  Absent   L Hand  Strong   R Foot Dorsiflexion Absent   L Foot Dorsiflexion Strong   R Foot Plantar Flexion Absent   L Foot Plantar Flexion Strong   RUE Motor Response Flaccid   Sensation RUE Decreased   LUE Motor Response Responds to command;Normal extension;Normal flexion   Sensation LUE Full sensation   RLE Motor Response Flaccid   Sensation RLE Tingling   LLE Motor Response Responds to command   Sensation LLE Full sensation   Gag Present   Karmen Coma Scale   Eye Opening 4   Best Verbal Response 5   Best Motor Response 6   Clever Coma Scale Score 15   HEENT   HEENT (WDL) WDL   Respiratory   Respiratory (WDL) WDL   Breath Sounds   Right Upper Lobe Clear   Right Middle Lobe Clear   Right Lower Lobe Diminished   Left Upper Lobe Clear   Left Lower Lobe Diminished   Cardiac   Cardiac (WDL) WDL   Gastrointestinal   Abdominal (WDL) X   Last BM (including prior to admit) 02/05/22  (per patient report)   RUQ Bowel Sounds Active   LUQ Bowel Sounds Active   RLQ Bowel Sounds Active   LLQ Bowel Sounds Active   Abdomen Inspection Rounded   Tenderness Soft;Nontender   Peripheral Vascular   Peripheral Vascular (WDL) WDL   Edema None   Skin Color/Condition   Skin Color/Condition (WDL) WDL   Skin Integrity   Skin Integrity (WDL) WDL   Musculoskeletal   Musculoskeletal (WDL) X   RUE Flaccid   LUE Full movement   RL Extremity Flaccid   LL Extremity Full movement   Genitourinary   Genitourinary (WDL) X   Anus/Rectum   Anus/Rectum (WDL) WDL   Urethral Catheter Straight-tip 14 fr   Placement Date/Time: 02/04/22 2320   Urethral Catheter Timeout: Patient; Appropriate Equipment;Sterile technique  Inserted by: Cristin Sherman RN  Catheter Type: Straight-tip  Tube Size (fr): 14 fr  Catheter Balloon Size: 10 mL  Collection Container: St...    Catheter Indications Urinary retention (acute or chronic), continuous bladder irrigation or bladder outlet obstruction   Site Assessment Pink   Urine Color Yellow   Urine Appearance Clear   Psychosocial   Psychosocial (WDL) WDL

## 2022-02-07 NOTE — PROGRESS NOTES
Physical Therapy  Facility/Department: [facility] MED SURG  Daily Treatment Note  NAME: Noreen Chen  : 1965  MRN: [id number]    Date of Service: 2022    Discharge Recommendations:  Continue to assess pending progress      Assessment   Assessment: Patient required Mod A for rolling to her left side. Transitioned supine to sit with Min A and was able to complete L LE therex while seated EOB holding rail with L hand. Partial cotreatment with OT. Worked on patient's sitting balance and trunk control with patient exhibiting improved trunk control and improved ability to correct LOB. Patient stood at bedside with Min A of 2 and tolerated ~1 minute of standing. Patient would benefit from a R KAFO for knee and ankle support when standing. Patient transferred to the recliner with Mod A of 2. Patient is making slow but steady progress and will benefit from continued rehab services. REQUIRES PT FOLLOW UP: Yes  Activity Tolerance  Activity Tolerance: Patient Tolerated treatment well     Patient Diagnosis(es): There were no encounter diagnoses. has a past medical history of Diabetes mellitus type 2 with complications ([address] History of cerebrovascular accident (CVA) with residual deficit, and HTN (hypertension). has no past surgical history on file. Restrictions  Restrictions/Precautions  Restrictions/Precautions: General Precautions,Isolation,Fall Risk,Contact Precautions  Required Braces or Orthoses?: No  Subjective   General  Chart Reviewed: Yes  Response To Previous Treatment: Not applicable  Family / Caregiver Present: No  Subjective  Subjective: Patient states she's ready to get up. Offers no new c/o.   Pain Screening  Patient Currently in Pain: Denies  Vital Signs  Patient Currently in Pain: Denies       Objective   Bed mobility  Rolling to Left: Moderate assistance  Supine to Sit: Minimal assistance  Scooting: Minimal assistance  Transfers  Sit to Stand: Minimal Assistance;2 Person Assistance  Stand to sit: Minimal Assistance;2 Person Assistance  Bed to Chair: Moderate assistance;2 Person Assistance        Balance  Posture: Fair  Sitting - Static: Fair  Sitting - Dynamic: Fair  Standing - Static: Poor  Standing - Dynamic: Poor;-  Exercises  Hip Flexion: 2x10 LLE  Hip Abduction: 2x10 LLE  Knee Long Arc Quad: 2x10 LLE  Ankle Pumps: 2x10 LLE  Comments: PROM for R LE      Goals  Long term goals  Time Frame for Long term goals : 4 weeks  Long term goal 1: Patient to achieve bed mobility with mod assist x 1. Long term goal 2: Patient to transfer supine to sit and sit to supine with mod assist x 1. Long term goal 3: Patient to transfer bed to chair with sliding board with mod assist x 1. Long term goal 4: Patient to achieve Fair + static sitting balance. Plan    Plan  Times per week: 4-5 x week  Plan weeks: 4 weeks  Current Treatment Recommendations: Strengthening,Neuromuscular Re-education,Home Exercise Program,Safety Education & Training,Patient/Caregiver Education & Training,Manual Therapy - Soft Tissue Mobilization,Manual Therapy - Joint Manipulation,Functional Mobility Training,Transfer Training,ADL/Self-care Training,Balance Training  Safety Devices  Type of devices: Left in chair,Call light within reach     Therapy Time   Individual Concurrent Group Co-treatment   Time In 1134         Time Out 1200         Minutes 26              This note serves as D/C summary if patient is discharged prior to next visit.   Talon Gimenez, PTA

## 2022-02-08 LAB
GLUCOSE BLD-MCNC: 144 MG/DL (ref 74–106)
GLUCOSE BLD-MCNC: 147 MG/DL (ref 74–106)
GLUCOSE BLD-MCNC: 154 MG/DL (ref 74–106)
GLUCOSE BLD-MCNC: 309 MG/DL (ref 74–106)
PERFORMED ON: ABNORMAL

## 2022-02-08 PROCEDURE — 1200000002 HC SEMI PRIVATE SWING BED

## 2022-02-08 PROCEDURE — 6370000000 HC RX 637 (ALT 250 FOR IP): Performed by: PHYSICIAN ASSISTANT

## 2022-02-08 PROCEDURE — 97110 THERAPEUTIC EXERCISES: CPT

## 2022-02-08 PROCEDURE — 92507 TX SP LANG VOICE COMM INDIV: CPT

## 2022-02-08 PROCEDURE — 97112 NEUROMUSCULAR REEDUCATION: CPT

## 2022-02-08 PROCEDURE — 6360000002 HC RX W HCPCS: Performed by: PHYSICIAN ASSISTANT

## 2022-02-08 PROCEDURE — 97803 MED NUTRITION INDIV SUBSEQ: CPT

## 2022-02-08 PROCEDURE — 97530 THERAPEUTIC ACTIVITIES: CPT

## 2022-02-08 RX ADMIN — Medication 4 UNITS: at 17:03

## 2022-02-08 RX ADMIN — OXYCODONE HYDROCHLORIDE AND ACETAMINOPHEN 500 MG: 500 TABLET ORAL at 08:00

## 2022-02-08 RX ADMIN — Medication 1 UNITS: at 11:10

## 2022-02-08 RX ADMIN — ENOXAPARIN SODIUM 40 MG: 100 INJECTION SUBCUTANEOUS at 20:11

## 2022-02-08 RX ADMIN — CEFDINIR 300 MG: 300 CAPSULE ORAL at 20:11

## 2022-02-08 RX ADMIN — Medication 12 UNITS: at 20:15

## 2022-02-08 RX ADMIN — CARVEDILOL 12.5 MG: 12.5 TABLET, FILM COATED ORAL at 17:04

## 2022-02-08 RX ADMIN — ALOGLIPTIN 25 MG: 12.5 TABLET, FILM COATED ORAL at 08:00

## 2022-02-08 RX ADMIN — ATORVASTATIN CALCIUM 80 MG: 80 TABLET, FILM COATED ORAL at 20:11

## 2022-02-08 RX ADMIN — CARVEDILOL 12.5 MG: 12.5 TABLET, FILM COATED ORAL at 08:00

## 2022-02-08 RX ADMIN — LISINOPRIL 20 MG: 20 TABLET ORAL at 08:00

## 2022-02-08 RX ADMIN — ZINC SULFATE 220 MG (50 MG) CAPSULE 50 MG: CAPSULE at 08:00

## 2022-02-08 RX ADMIN — Medication 1 UNITS: at 20:15

## 2022-02-08 RX ADMIN — CEFDINIR 300 MG: 300 CAPSULE ORAL at 08:00

## 2022-02-08 RX ADMIN — ASPIRIN 325 MG ORAL TABLET 325 MG: 325 PILL ORAL at 08:00

## 2022-02-08 RX ADMIN — METFORMIN HYDROCHLORIDE 500 MG: 500 TABLET ORAL at 17:05

## 2022-02-08 RX ADMIN — Medication 1 TABLET: at 08:00

## 2022-02-08 RX ADMIN — METFORMIN HYDROCHLORIDE 500 MG: 500 TABLET ORAL at 08:00

## 2022-02-08 RX ADMIN — AMLODIPINE BESYLATE 10 MG: 5 TABLET ORAL at 08:00

## 2022-02-08 RX ADMIN — PANTOPRAZOLE SODIUM 40 MG: 40 TABLET, DELAYED RELEASE ORAL at 06:31

## 2022-02-08 RX ADMIN — Medication 1 UNITS: at 08:00

## 2022-02-08 ASSESSMENT — PAIN SCALES - GENERAL: PAINLEVEL_OUTOF10: 0

## 2022-02-08 NOTE — FLOWSHEET NOTE
02/07/22 2159   Assessment   Charting Type Shift assessment   Neurological   Neuro (WDL) X   Level of Consciousness Alert (0)   Orientation Level Oriented X4   Cognition Appropriate judgement   Language Clear;Paraphasic errors   Size R Pupil (mm) 3   R Pupil Shape Round   R Pupil Reaction Brisk   Size L Pupil (mm) 3   L Pupil Shape Round   L Pupil Reaction Brisk   R Hand  Absent   L Hand  Strong   R Foot Dorsiflexion Absent   L Foot Dorsiflexion Strong   R Foot Plantar Flexion Absent   L Foot Plantar Flexion Strong   RUE Motor Response Flaccid   Sensation RUE Decreased   LUE Motor Response Responds to command;Normal extension;Normal flexion   Sensation LUE Full sensation   RLE Motor Response Flaccid   Sensation RLE Tingling   LLE Motor Response Responds to command   Sensation LLE Full sensation   Gag Present   Tongue Deviation None   Swallow Screening   Is the patient able to remain alert for testing? Yes   Was the Patient Eating a Modified Diet Prior to being Admitted? No   Is there an Existing PEG or Abdominal Feeding Tube?  No   Greer Coma Scale   Eye Opening 4   Best Verbal Response 5   Best Motor Response 6   Greer Coma Scale Score 15   HEENT   HEENT (WDL) WDL   Respiratory   Respiratory (WDL) WDL   Respiratory Pattern Regular   Respiratory Depth Normal   Respiratory Quality/Effort Unlabored   Chest Assessment Chest expansion symmetrical   L Breath Sounds Clear   R Breath Sounds Clear   Breath Sounds   Right Upper Lobe Clear   Right Middle Lobe Clear   Right Lower Lobe Diminished   Left Upper Lobe Clear   Left Lower Lobe Diminished   Cardiac   Cardiac (WDL) WDL   Cardiac Monitor   Telemetry Monitor On No   Gastrointestinal   Abdominal (WDL) X   Last BM (including prior to admit) 02/05/22   RUQ Bowel Sounds Active   LUQ Bowel Sounds Active   RLQ Bowel Sounds Active   LLQ Bowel Sounds Active   Abdomen Inspection Rounded   Tenderness Soft;Nontender   Peripheral Vascular   Peripheral Vascular (WDL)

## 2022-02-08 NOTE — PROGRESS NOTES
Physical Therapy  Facility/Department: [facility] MED SURG  Daily Treatment Note  NAME: Noreen Fraga  : 1965  MRN: [id number]    Date of Service: 2022    Discharge Recommendations:  Continue to assess pending progress      Assessment   Assessment: Cotreated with OT. Patient completed bed mobility tasks with min A. Requires cues for hand/foot placement to establish sitting balance at bedside. Patient stood with Min A of 2 and transferred to w/c with Mod A of 2 due to having diffculty moving her feet. Patient able to propel w/c in the hallway with cues for negotiating obstacles on her R side. Patient less talkative today and appeared down. She states she's ok, just worried about getting to [facility]. She reports having difficulty with her speech today. Patient transferred to [facility] with Mod A of 2.  REQUIRES PT FOLLOW UP: Yes  Activity Tolerance  Activity Tolerance: Patient Tolerated treatment well;Patient limited by fatigue     Patient Diagnosis(es): There were no encounter diagnoses. has a past medical history of Diabetes mellitus type 2 with complications ([address] History of cerebrovascular accident (CVA) with residual deficit, and HTN (hypertension). has no past surgical history on file. Restrictions  Restrictions/Precautions  Restrictions/Precautions: General Precautions,Isolation,Fall Risk,Contact Precautions  Required Braces or Orthoses?: No  Subjective   General  Chart Reviewed: Yes  Response To Previous Treatment: Patient with no complaints from previous session. Family / Caregiver Present: No  Subjective  Subjective: Patient more quiet today but agreeable to therapy.   Pain Screening  Patient Currently in Pain: Denies  Vital Signs  Patient Currently in Pain: Denies       Objective   Bed mobility  Rolling to Right: Minimal assistance  Supine to Sit: Minimal assistance  Scooting: Minimal assistance  Transfers  Sit to Stand: Minimal Assistance;2 Person Assistance  Stand to sit: Minimal Assistance;2 Person Assistance  Bed to Chair: Moderate assistance;2 Person Assistance        Balance  Posture: Fair  Sitting - Static: Fair  Sitting - Dynamic: Fair  Standing - Static: Poor  Standing - Dynamic: Poor;-      Goals  Long term goals  Time Frame for Long term goals : 4 weeks  Long term goal 1: Patient to achieve bed mobility with mod assist x 1. Long term goal 2: Patient to transfer supine to sit and sit to supine with mod assist x 1. Long term goal 3: Patient to transfer bed to chair with sliding board with mod assist x 1. Long term goal 4: Patient to achieve Fair + static sitting balance. Plan    Plan  Times per week: 4-5 x week  Plan weeks: 4 weeks  Current Treatment Recommendations: Strengthening,Neuromuscular Re-education,Home Exercise Program,Safety Education & Training,Patient/Caregiver Education & Training,Manual Therapy - Soft Tissue Mobilization,Manual Therapy - Joint Manipulation,Functional Mobility Training,Transfer Training,ADL/Self-care Training,Balance Training  Safety Devices  Type of devices: Left in chair,Call light within reach     Therapy Time   Individual Concurrent Group Co-treatment   Time In 2510         Time Out 1490         Minutes 54              This note serves as D/C summary if patient is discharged prior to next visit.   Margaret Bush, PTA

## 2022-02-08 NOTE — FLOWSHEET NOTE
02/08/22 3835   Assessment   Charting Type Shift assessment   Neurological   Neuro (WDL) X   Level of Consciousness Alert (0)   Orientation Level Oriented X4   Cognition Appropriate judgement   Language Clear;Paraphasic errors   R Hand  Absent   L Hand  Strong   R Foot Dorsiflexion Absent   L Foot Dorsiflexion Strong   R Foot Plantar Flexion Absent   L Foot Plantar Flexion Strong   RUE Motor Response Flaccid   Sensation RUE Decreased   LUE Motor Response Responds to command;Normal extension;Normal flexion   Sensation LUE Full sensation   RLE Motor Response Flaccid   Sensation RLE Tingling   LLE Motor Response Responds to command   Sensation LLE Full sensation   Gag Present   Tongue Deviation None   Karmen Coma Scale   Eye Opening 4   Best Verbal Response 5   Best Motor Response 6   Dearborn Coma Scale Score 15   HEENT   HEENT (WDL) WDL   Respiratory   Respiratory (WDL) WDL   L Breath Sounds Clear   R Breath Sounds Clear   Breath Sounds   Right Upper Lobe Clear   Right Middle Lobe Clear   Right Lower Lobe Diminished   Left Upper Lobe Clear   Left Lower Lobe Diminished   Cardiac   Cardiac (WDL) WDL   Cardiac Monitor   Telemetry Monitor On No   Gastrointestinal   Abdominal (WDL) X   Last BM (including prior to admit) 02/05/22   RUQ Bowel Sounds Active   LUQ Bowel Sounds Active   RLQ Bowel Sounds Active   LLQ Bowel Sounds Active   Abdomen Inspection Rounded   Tenderness Soft;Nontender   Peripheral Vascular   Peripheral Vascular (WDL) WDL   Skin Color/Condition   Skin Color/Condition (WDL) WDL   Skin Integrity   Skin Integrity (WDL) WDL   Musculoskeletal   Musculoskeletal (WDL) X   RUE Flaccid   LUE Full movement   RL Extremity Flaccid   LL Extremity Full movement   Genitourinary   Genitourinary (WDL) X   Anus/Rectum   Anus/Rectum (WDL) WDL   Urethral Catheter Straight-tip 14 fr   Placement Date/Time: 02/04/22 4050   Urethral Catheter Timeout: Patient; Appropriate Equipment;Sterile technique  Inserted by: Bryson Draper Jessy Bermudez RN  Catheter Type: Straight-tip  Tube Size (fr): 14 fr  Catheter Balloon Size: 10 mL  Collection Container: St...    Catheter Indications Urinary retention (acute or chronic), continuous bladder irrigation or bladder outlet obstruction   Site Assessment Pink   Urine Color Yellow   Urine Appearance Clear   Psychosocial   Psychosocial (WDL) WDL

## 2022-02-08 NOTE — PROGRESS NOTES
Occupational Therapy  Facility/Department: [facility] MED SURG  Daily Treatment Note  NAME: Noreen Pedroza Persons  : 1965  MRN: [id number]    Date of Service: 2022    Discharge Recommendations:  Continue to assess pending progress       Assessment   Assessment: Pt seen for BID session. Pt seated upright in recliner. Pt completed visual scanning worksheets on this date with a focus on scanning using fingertip for visual cue. Pt demo ability to complete task with minimal errors requiring min verbal cuing to rescan. Pt had difficulty at times diffrentiating similar shaped letters/words. Pt demo ability to read large font passage with 90% accuracy with min verbal cuing. Pt tolerated task well. OT facilitated PROM of RUE to increase mobility with mental practice for associated motion. Pt tolerated well. Pt continues to exhibit no volitional movement on RUE during ROM. Patient Diagnosis(es): There were no encounter diagnoses. has a past medical history of Diabetes mellitus type 2 with complications ([address] History of cerebrovascular accident (CVA) with residual deficit, and HTN (hypertension). has no past surgical history on file. Restrictions  Restrictions/Precautions  Restrictions/Precautions: General Precautions,Isolation,Fall Risk,Contact Precautions  Required Braces or Orthoses?: No  Subjective   General  Chart Reviewed: Yes  Patient assessed for rehabilitation services?: Yes  Family / Caregiver Present: No  Referring Practitioner: Sayra Lam PA-C  Diagnosis: Declining functional status  Subjective  Subjective: Pt agreeable to OT services. Pt reports no pain. Orientation     Objective             Functional Mobility  Functional - Mobility Device: Wheelchair  Assist Level: Contact guard assistance (<>MIN A)  Functional Mobility Comments: 200 feet x2  Bed mobility  Rolling to Right: Minimal assistance  Supine to Sit: Minimal assistance  Transfers  Stand Pivot Transfers:  Moderate assistance;2 Person assistance  Sit Pivot Transfers: Moderate assistance;2 Person assistance  Sit to stand: Minimal assistance; Moderate assistance;2 Person assistance                                            Type of ROM/Therapeutic Exercise  Type of ROM/Therapeutic Exercise: PROM  Exercises  Shoulder Elevation: x10  Shoulder Flexion: x10  Shoulder Extension: x10  Shoulder ABduction: x10  Shoulder ADduction: x10  Horizontal ABduction: x10  Horizontal ADduction: x10  Elbow Flexion: x10  Elbow Extension: x10  Supination: x10  Pronation: x10  Wrist Flexion: x10  Wrist Extension: x10  Finger Flexion: x10  Finger Extension: x10                    Plan   Plan  Times per week: 3-5  Times per day: Daily  Plan weeks: 2-4  Current Treatment Recommendations: Strengthening,ROM,Balance Training,Functional Mobility Training,Endurance Training,Equipment Evaluation, Education, & procurement,Neuromuscular Re-education,Self-Care / ADL,Patient/Caregiver Education & Training,Safety Education & Training,Positioning    Goals  Short term goals  Time Frame for Short term goals: 2 weeks  Short term goal 1: pt to complete bed mobility with MOD I. Short term goal 2: pt to complete sliding board transfer with min A. Short term goal 3: pt to complete UB dressing with CGA and LB dressing with min A. Short term goal 4: pt to complete hygiene/grooming with HERNANDEZ seated at EOB with no VC. Short term goal 5: Pt to tolerate sitting at EOB x15 minutes with SBA. Long term goals  Time Frame for Long term goals : 4 weeks  Long term goal 1: pt to complete sliding board transfers with HERNANDEZ. Long term goal 2: Pt to self propel x100 feet with SUP. Long term goal 3: Pt to complete UB dressing with HERNANDEZ and LB dressing with SBA  Long term goal 4: Pt to complete bathing with min A. Long term goal 5: Pt to demo ability to complete visual scanning with no verbal cuing.        Therapy Time   Individual Concurrent Group Co-treatment   Time In 0345         Time Out 6548         Minutes 58              This note serves as a DC summary in the event of pt discharge.      Barbara Aden, OTR/L

## 2022-02-08 NOTE — PROGRESS NOTES
[facility]  SPEECH/LANGUAGE PATHOLOGY   INPATIENT DAILY NOTE      [x] Daily           [] Discharge    Patient:Noreen Rebollar      :1965  KWI:[id number]  Rehab Dx/Hx: Pneumonia due to COVID-19 virus [U07.1, J12.82]  Declining functional status [R53.81]   No Known Allergies  Precautions: Restrictions/Precautions: General Precautions,Isolation,Fall Risk,Contact Precautions     Home Situation/IADL:   Social/Functional History  Lives With: Spouse  Type of Home: House  Home Layout: Two level,Able to Live on Main level with bedroom/bathroom  Home Access: Stairs to enter without rails  Entrance Stairs - Number of Steps: 2  Bathroom Shower/Tub: Tub/Shower unit,Walk-in shower  Bathroom Toilet: Standard  Bathroom Equipment: Grab bars in shower  ADL Assistance: [id number] [place] [address] Avenue: Independent  Homemaking Responsibilities: Yes  Ambulation Assistance: Independent  Transfer Assistance: Independent  Active : Yes  Date of Admit: 2022  Room #: G104/G104-01      Number of Minutes/Billable Intervention  Cog/Memory Deficits     Aphasia/Language     Dysarthria/Speech 30   Apraxia/Speech     Dysphagia/Swallowing     Group     Other    TOTAL Minutes Billed  30    Variance          Date: 2022  Day of ARU Week:  5       SLP Individual Minutes  Time In: 10:50  Time Out: 11:20  Minutes: 30 [place] in:    Cotreat out-   [place] total-      Variance/Reason:  [] Refusal due to   [] Medical hold/reason  [] Illness   [] Off Unit for test/procedure  [] Extra time needed to complete task  [] Other (specify)    Activity completed: Patient upright in bed, pleasant and agreeable to ST tx. Patient pleasant and cooperative.  Patient completes expressive language tasks of general information with 84% accuracy, 93% accuracy with finishing open ended sentences, constant characteristics with 67% accuracy  Patient continues to present with increased speech intelligibility, Treatment/Activity Tolerance:   [x] Tolerated Treatment well:     [] Patient limited by fatigue/pain:       [] Patient limited by medical complications:    [] Adverse Reaction to Tx:   [] Significant change in status:

## 2022-02-08 NOTE — PROGRESS NOTES
Occupational Therapy  Facility/Department: [facility] MED SURG  Daily Treatment Note  NAME: Noreen Luque  : 1965  MRN: [id number]    Date of Service: 2022    Discharge Recommendations:  Continue to assess pending progress       Assessment   Assessment: CO tx with PTA. Pt educated on bed mobility compensatory technique to decrease level of assist. Pt continues to require min assist to come to sit at EOB. Pt requires CGA<>MIN A to maintain static sitting balance. Pt transferred from bed to chair with MOD x2 with increased difficulty on this date. Pt had difficulty pivoting LLE foot secondary to shoe. Pt self propelled in  feet x2 with decreased fatigue/rest breaks needed during this session. Pt did require increased verbal cuing for scanning and avoiding objects located to R side. Pt required assist with turning in doorway. OT facilitated WC propulsion with cues to use LLE for steering chair and LUE to assist with propusion. Pt appears sad/withdrawn today but reports she is feeling ok. BP assessed 112/73. Pt states she feels as if her speech is worse today. Upon returning to room pt assisted into standing position x2 trials with MOD X2 with verbal cuing for hand placement and correcting posture. Pt able to stand ~30 seconds-1 minute. Pt shoes removed and socks donned requiring DEP with task. Pt transferred to chair with MOD x2 assist. Pt left with call light in reach and BLE elevated. Patient Diagnosis(es): There were no encounter diagnoses. has a past medical history of Diabetes mellitus type 2 with complications ([address] History of cerebrovascular accident (CVA) with residual deficit, and HTN (hypertension). has no past surgical history on file.     Restrictions  Restrictions/Precautions  Restrictions/Precautions: General Precautions,Isolation,Fall Risk,Contact Precautions  Required Braces or Orthoses?: No  Subjective   General  Chart Reviewed: Yes  Patient assessed for rehabilitation services?: Yes  Family / Caregiver Present: No  Referring Practitioner: Elena Castro PA-C  Diagnosis: Declining functional status  Subjective  Subjective: Pt agreeable to OT services. Pt reports no pain. Orientation     Objective             Functional Mobility  Functional - Mobility Device: Wheelchair  Assist Level: Contact guard assistance (<>MIN A)  Functional Mobility Comments: 200 feet x2  Bed mobility  Rolling to Right: Minimal assistance  Supine to Sit: Minimal assistance  Transfers  Stand Pivot Transfers: Moderate assistance;2 Person assistance  Sit Pivot Transfers: Moderate assistance;2 Person assistance  Sit to stand: Minimal assistance; Moderate assistance;2 Person assistance        Plan   Plan  Times per week: 3-5  Times per day: Daily  Plan weeks: 2-4  Current Treatment Recommendations: Strengthening,ROM,Balance Training,Functional Mobility Training,Endurance Training,Equipment Evaluation, Education, & procurement,Neuromuscular Re-education,Self-Care / ADL,Patient/Caregiver Education & Training,Safety Education & Training,Positioning    Goals  Short term goals  Time Frame for Short term goals: 2 weeks  Short term goal 1: pt to complete bed mobility with MOD I. Short term goal 2: pt to complete sliding board transfer with min A. Short term goal 3: pt to complete UB dressing with CGA and LB dressing with min A. Short term goal 4: pt to complete hygiene/grooming with HERNANDEZ seated at EOB with no VC. Short term goal 5: Pt to tolerate sitting at EOB x15 minutes with SBA. Long term goals  Time Frame for Long term goals : 4 weeks  Long term goal 1: pt to complete sliding board transfers with HERNANDEZ. Long term goal 2: Pt to self propel x100 feet with SUP. Long term goal 3: Pt to complete UB dressing with HERNANDEZ and LB dressing with SBA  Long term goal 4: Pt to complete bathing with min A. Long term goal 5: Pt to demo ability to complete visual scanning with no verbal cuing.        Therapy Time Individual Concurrent Group Co-treatment   Time In 0133         Time Out 6295         Minutes 54              This note serves as a DC summary in the event of pt discharge.      Barbara Aden, OTR/L

## 2022-02-08 NOTE — PROGRESS NOTES
Nutrition Assessment     Type and Reason for Visit:  (follow up)    Nutrition Recommendations/Plan: Recommend to continue with current diet and ONS and encourage intakes of meals. Encourage compliance of diet. Nutrition Assessment:  Pt continues at moderate risk for ongoing nutritional compromise r/t poor-fair intakes and rt sided hemiplegia. Malnutrition Assessment:  Malnutrition Status: Insufficient data    Estimated Daily Nutrient Needs:  Energy (kcal): 3402-3164 (22-24 kcal/kg cbw); Weight Used for Energy Requirements:  Current     Protein (g): 71 (1.3 gm/kg IBW); Weight Used for Protein Requirements:  Ideal        Fluid (ml/day): 0413-8254; Weight Used for Fluid Requirements:  1 ml/kcal      Nutrition Related Findings: NO new weight, no edema reported, no skin issues reported. Pt still exhitbits poor intakes, but says son is bringing food to her and she is drinking ONS. [id number]      Current Nutrition Therapies:    ADULT DIET; Regular; 4 carb choices (60 gm/meal);  Low Fat/Low Chol/High Fiber/JARROD; Low Sodium (2 gm)  ADULT ORAL NUTRITION SUPPLEMENT; Breakfast, Lunch, Dinner; Diabetic Oral Supplement    Anthropometric Measures:  · Height: 5' 4\" (162.6 cm)  · Current Body Wt: 147 lb 9.6 oz (67 kg)   · BMI: 25.3    Nutrition Diagnosis:   · Predicted inadequate energy intake related to cognitive or neurological impairment,other (comment) (dislike of hospital food) as evidenced by intake 0-25%      Nutrition Interventions:   Food and/or Nutrient Delivery:  Continue Current Diet,Continue Oral Nutrition Supplement  Nutrition Education/Counseling:  Education needed,Education initiated,Education completed (left contact info for follow questions or more info.)   Coordination of Nutrition Care:  Continue to monitor while inpatient,Interdisciplinary Rounds    Goals:  to meet est needs for age/condition and provide nutrition counseling for diet       Nutrition Monitoring and Evaluation: Behavioral-Environmental Outcomes:  Knowledge or Skill   Food/Nutrient Intake Outcomes:  Food and Nutrient Intake,Supplement Intake  Physical Signs/Symptoms Outcomes:  Biochemical Data,Weight     Discharge Planning:    Continue current diet     Electronically signed by Kalina Hamilton MS, RD, LD on 2/8/22 at 1:19 PM EST

## 2022-02-09 LAB
GLUCOSE BLD-MCNC: 134 MG/DL (ref 74–106)
GLUCOSE BLD-MCNC: 145 MG/DL (ref 74–106)
GLUCOSE BLD-MCNC: 176 MG/DL (ref 74–106)
GLUCOSE BLD-MCNC: 217 MG/DL (ref 74–106)
PERFORMED ON: ABNORMAL

## 2022-02-09 PROCEDURE — 1200000002 HC SEMI PRIVATE SWING BED

## 2022-02-09 PROCEDURE — 6360000002 HC RX W HCPCS: Performed by: PHYSICIAN ASSISTANT

## 2022-02-09 PROCEDURE — 97110 THERAPEUTIC EXERCISES: CPT

## 2022-02-09 PROCEDURE — 6370000000 HC RX 637 (ALT 250 FOR IP): Performed by: PHYSICIAN ASSISTANT

## 2022-02-09 PROCEDURE — 92507 TX SP LANG VOICE COMM INDIV: CPT

## 2022-02-09 PROCEDURE — 97112 NEUROMUSCULAR REEDUCATION: CPT

## 2022-02-09 PROCEDURE — 97530 THERAPEUTIC ACTIVITIES: CPT

## 2022-02-09 PROCEDURE — 97535 SELF CARE MNGMENT TRAINING: CPT

## 2022-02-09 RX ADMIN — ALOGLIPTIN 25 MG: 12.5 TABLET, FILM COATED ORAL at 08:04

## 2022-02-09 RX ADMIN — CEFDINIR 300 MG: 300 CAPSULE ORAL at 08:05

## 2022-02-09 RX ADMIN — ENOXAPARIN SODIUM 40 MG: 100 INJECTION SUBCUTANEOUS at 20:42

## 2022-02-09 RX ADMIN — Medication 1 TABLET: at 08:04

## 2022-02-09 RX ADMIN — Medication 1 UNITS: at 08:01

## 2022-02-09 RX ADMIN — CARVEDILOL 12.5 MG: 12.5 TABLET, FILM COATED ORAL at 08:04

## 2022-02-09 RX ADMIN — METFORMIN HYDROCHLORIDE 500 MG: 500 TABLET ORAL at 16:40

## 2022-02-09 RX ADMIN — OXYCODONE HYDROCHLORIDE AND ACETAMINOPHEN 500 MG: 500 TABLET ORAL at 08:06

## 2022-02-09 RX ADMIN — METFORMIN HYDROCHLORIDE 500 MG: 500 TABLET ORAL at 08:04

## 2022-02-09 RX ADMIN — Medication 2 UNITS: at 17:00

## 2022-02-09 RX ADMIN — ZINC SULFATE 220 MG (50 MG) CAPSULE 50 MG: CAPSULE at 08:05

## 2022-02-09 RX ADMIN — Medication 12 UNITS: at 20:44

## 2022-02-09 RX ADMIN — PANTOPRAZOLE SODIUM 40 MG: 40 TABLET, DELAYED RELEASE ORAL at 07:10

## 2022-02-09 RX ADMIN — LISINOPRIL 20 MG: 20 TABLET ORAL at 08:04

## 2022-02-09 RX ADMIN — Medication 1 UNITS: at 12:00

## 2022-02-09 RX ADMIN — CEFDINIR 300 MG: 300 CAPSULE ORAL at 20:42

## 2022-02-09 RX ADMIN — ATORVASTATIN CALCIUM 80 MG: 80 TABLET, FILM COATED ORAL at 20:42

## 2022-02-09 RX ADMIN — CARVEDILOL 12.5 MG: 12.5 TABLET, FILM COATED ORAL at 16:40

## 2022-02-09 RX ADMIN — ASPIRIN 325 MG ORAL TABLET 325 MG: 325 PILL ORAL at 08:05

## 2022-02-09 RX ADMIN — AMLODIPINE BESYLATE 10 MG: 5 TABLET ORAL at 08:05

## 2022-02-09 NOTE — PROGRESS NOTES
Occupational Therapy  Facility/Department: [facility] MED SURG  Daily Treatment Note  NAME: Noreen Dawson  : 1965  MRN: [id number]    Date of Service: 2022    Discharge Recommendations:  Continue to assess pending progress       Assessment   Assessment: Pt agreeable to OT services. Pt seen for BID session. Pt seated in recliner. Pt completed visual scanning activity requiring 25% verbal cuing to correct errors. Pt able to correct with verbal cuing for 100% accuracy. Pt does require rest breaks at times secondary to fatigue. Pt demo improved progress with reading and visual scanning/atten tasks. Pt completed functional reaching to R side using LUE and demo ability to scan and reach appropriately without error. Pt tolerated BUE PROM to RUE to improve flexbility and ROM. Patient Diagnosis(es): There were no encounter diagnoses. has a past medical history of Diabetes mellitus type 2 with complications ([address] History of cerebrovascular accident (CVA) with residual deficit, and HTN (hypertension). has no past surgical history on file. Restrictions  Restrictions/Precautions  Restrictions/Precautions: General Precautions,Isolation,Fall Risk,Contact Precautions  Required Braces or Orthoses?: No  Subjective   General  Chart Reviewed: Yes  Patient assessed for rehabilitation services?: Yes  Family / Caregiver Present: No  Referring Practitioner: Marco Braswell PA-C  Diagnosis: Declining functional status  Subjective  Subjective: Pt agreeable to OT services. Pt reports no pain. Orientation     Objective    ADL  Grooming: Contact guard assistance  UE Dressing: Minimal assistance  LE Dressing: Maximum assistance        Functional Mobility  Functional - Mobility Device: Wheelchair  Assist Level: Contact guard assistance  Functional Mobility Comments: 400 feet with CGA occasional min assist secondary to fatigue and uneven surfaces.   Bed mobility  Supine to Sit: Minimal assistance  Scooting: Minimal assistance  Transfers  Stand Pivot Transfers: Moderate assistance;2 Person assistance  Sit Pivot Transfers: Moderate assistance;2 Person assistance  Sit to stand: Minimal assistance;2 Person assistance        Plan   Plan  Times per week: 3-5  Times per day: Daily  Plan weeks: 2-4  Current Treatment Recommendations: Strengthening,ROM,Balance Training,Functional Mobility Training,Endurance Training,Equipment Evaluation, Education, & procurement,Neuromuscular Re-education,Self-Care / ADL,Patient/Caregiver Education & Training,Safety Education & Training,Positioning    Goals  Short term goals  Time Frame for Short term goals: 2 weeks  Short term goal 1: pt to complete bed mobility with MOD I. Short term goal 2: pt to complete sliding board transfer with min A. Short term goal 3: pt to complete UB dressing with CGA and LB dressing with min A. Short term goal 4: pt to complete hygiene/grooming with HERNANDEZ seated at EOB with no VC. Short term goal 5: Pt to tolerate sitting at EOB x15 minutes with SBA. Long term goals  Time Frame for Long term goals : 4 weeks  Long term goal 1: pt to complete sliding board transfers with HERNANDEZ. Long term goal 2: Pt to self propel x100 feet with SUP. Long term goal 3: Pt to complete UB dressing with HERNANDEZ and LB dressing with SBA  Long term goal 4: Pt to complete bathing with min A. Long term goal 5: Pt to demo ability to complete visual scanning with no verbal cuing. Therapy Time   Individual Concurrent Group Co-treatment   Time In 4586         Time Out 0400         Minutes 55              This note serves as a DC summary in the event of pt discharge.      Barbara Aden OTR/L

## 2022-02-09 NOTE — FLOWSHEET NOTE
02/09/22 0806   Assessment   Charting Type Shift assessment   Neurological   Neuro (WDL) X   Level of Consciousness Alert (0)   Orientation Level Oriented X4   Cognition Appropriate judgement; Appropriate safety awareness; Appropriate attention/concentration   Language Clear;Delayed responses;Paraphasic errors   R Hand  Absent   L Hand  Strong   R Foot Dorsiflexion Absent   L Foot Dorsiflexion Strong   R Foot Plantar Flexion Absent   L Foot Plantar Flexion Strong   RUE Motor Response Flaccid   Sensation RUE Decreased   LUE Motor Response Responds to command;Normal extension;Normal flexion   Sensation LUE Full sensation   RLE Motor Response Flaccid   Sensation RLE Tingling   LLE Motor Response Responds to command   Sensation LLE Full sensation   Gag Present   Karmen Coma Scale   Eye Opening 4   Best Verbal Response 5   Best Motor Response 6   Karmen Coma Scale Score 15   HEENT   HEENT (WDL) WDL   Respiratory   Respiratory (WDL) WDL   Respiratory Pattern Regular   Respiratory Depth Normal   Respiratory Quality/Effort Unlabored   L Breath Sounds Clear   R Breath Sounds Clear   Breath Sounds   Right Upper Lobe Clear   Right Middle Lobe Clear   Right Lower Lobe Diminished   Left Upper Lobe Clear   Left Lower Lobe Diminished   Cardiac   Cardiac (WDL) WDL   Cardiac Monitor   Telemetry Monitor On No   Gastrointestinal   Abdominal (WDL) X   Last BM (including prior to admit) 02/08/22   RUQ Bowel Sounds Active   LUQ Bowel Sounds Active   RLQ Bowel Sounds Active   LLQ Bowel Sounds Active   Abdomen Inspection Rounded   Tenderness Soft;Nontender   Peripheral Vascular   Peripheral Vascular (WDL) WDL   Edema None   Skin Color/Condition   Skin Color/Condition (WDL) WDL   Skin Integrity   Skin Integrity (WDL) WDL   Musculoskeletal   Musculoskeletal (WDL) X   RUE Flaccid   LUE Full movement   RL Extremity Flaccid   LL Extremity Full movement   Genitourinary   Genitourinary (WDL) X   Flank Tenderness No   Suprapubic Tenderness No   Dysuria No   Anus/Rectum   Anus/Rectum (WDL) WDL   Urethral Catheter Straight-tip 14 fr   Placement Date/Time: 02/04/22 9345   Urethral Catheter Timeout: Patient; Appropriate Equipment;Sterile technique  Inserted by: Ena England RN  Catheter Type: Straight-tip  Tube Size (fr): 14 fr  Catheter Balloon Size: 10 mL  Collection Container: St...    Catheter Indications Urinary retention (acute or chronic), continuous bladder irrigation or bladder outlet obstruction   Site Assessment Pink   Urine Color Yellow   Urine Appearance Sediment   Psychosocial   Psychosocial (WDL) WDL

## 2022-02-09 NOTE — CARE COORDINATION
Interdisciplinary rounding completed. Nixon Maguire (provider rep), Seth Zurita (), Trish Tillman (nursing), Sindi (PT), Barbara (OT), Kiko Corona (pharmacy), and Leanna (dietitian) all involved. Activities reviewed with OT.      * [facility] referral*  Lives with SO. No DME at baseline. Att, pt will need WC, BSC, Bed rail and HH Pt come to sit at EOB with min A using missy technique. Pt sat at EOB with CGA<>SBA. Pt did require min assist for scooting at EOB. Pt required max assist to don pants in bed but demo ability to bridge and assist with pulling up on L side. Pt doffed UB clothing with min assist and min assist to don using MISSY technique as educated. OT provided min verbal cuing to complete task and pull shirt down. Pt come to stand 3 trials with Min x2 assist to come to stand with support to extend RLE knee. pt able to stand ~1 minute each trial then fatigued requiring seated rest break. Pt transferred to [facility] with MOD x2 assist with verbal cuing for sequencing transfer and hand placement. Pt self propelled WC with decreased fatigue and rest breaks noted. Pt required occasional min assist on uneven surface and verbal cuing to use LUE hand to assist. Upon returning to room pt completed transfer to recliner with MOD x2 assist. wt. 147.6lb, no new weight since admit. 4 Carb serving, low fat/low chol/high fiber 2 gm sodium diet. ONS TID. Pt eating poorly however states son brings in food. Completed 7 days of antibitoics (Rocephin then transition to cephalexin) today for e.coli UTI. Lovenox 40mg subq daily for VTE prohylaxis. CM spoke with Rosaline Fish at [facility] this AM.  Waiting for insurance to approve and we will transfer as soon as approved. Son to drive her there.

## 2022-02-09 NOTE — PROGRESS NOTES
[facility]  SPEECH/LANGUAGE PATHOLOGY   INPATIENT DAILY NOTE      [x] Daily           [] Discharge    Patient:Noreen Nieto Core      :1965  UIU:[id number]  Rehab Dx/Hx: Pneumonia due to COVID-19 virus [U07.1, J12.82]  Declining functional status [R53.81]   No Known Allergies  Precautions: Restrictions/Precautions: General Precautions,Isolation,Fall Risk,Contact Precautions     Home Situation/IADL:   Social/Functional History  Lives With: Spouse  Type of Home: House  Home Layout: Two level,Able to Live on Main level with bedroom/bathroom  Home Access: Stairs to enter without rails  Entrance Stairs - Number of Steps: 2  Bathroom Shower/Tub: Tub/Shower unit,Walk-in shower  Bathroom Toilet: Standard  Bathroom Equipment: Grab bars in shower  ADL Assistance: [address] [place] [address] Avenue: Independent  Homemaking Responsibilities: Yes  Ambulation Assistance: Independent  Transfer Assistance: Independent  Active : Yes  Date of Admit: 2022  Room #: G104/G104-01      Number of Minutes/Billable Intervention  Cog/Memory Deficits     Aphasia/Language     Dysarthria/Speech 30   Apraxia/Speech     Dysphagia/Swallowing     Group     Other    TOTAL Minutes Billed  30    Variance          Date: 2022  Day of ARU Week:  6       SLP Individual Minutes  Time In: 1:45  Time Out: 2:24  Minutes: 34 [place] in:    [place] out-   [place] total-      Variance/Reason:  [] Refusal due to   [] Medical hold/reason  [] Illness   [] Off Unit for test/procedure  [] Extra time needed to complete task  [] Other (specify)    Activity completed: Patient seated upright in recliner, pleasant and agreeable to ST tx. Patient completes expressive language tasks with abstract categories with 15% accuracy , [place] Members with 71% accuracy, general information questions with 90% accuracy.  Patient continues to present with increased speech intelligibility, Patient continues to present Treatment/Activity Tolerance:   [x] Tolerated Treatment well:     [] Patient limited by fatigue/pain:       [] Patient limited by medical complications:    [] Adverse Reaction to Tx:   [] Significant change in status:

## 2022-02-09 NOTE — CARE COORDINATION
Randal Owen from [facility] called about patient. Percent is still pending for review. She will give us a call once patient can be accepted.

## 2022-02-09 NOTE — PROGRESS NOTES
Occupational Therapy  Facility/Department: [facility] MED SURG  Daily Treatment Note  NAME: Noreen Dewitt  : 1965  MRN: [id number]    Date of Service: 2022    Discharge Recommendations:  Continue to assess pending progress       Assessment   Assessment: Pt agreeable to OT services Co tx with PTA. Pt educated on bed mobility techniques. Pt come to sit at EOB with min A using missy technique. Pt sat at EOB with CGA<>SBA. Pt did require min assist for scooting at EOB. Pt required max assist to don pants in bed but demo ability to bridge and assist with pulling up on L side. Pt doffed UB clothing with min assist and min assist to don using MISSY technique as educated. OT provided min verbal cuing to complete task and pull shirt down. Pt come to stand 3 trials with Min x2 assist to come to stand with support to extend RLE knee. pt able to stand ~1 minute each trial then fatigued requiring seated rest break. Pt transferred to [facility] with MOD x2 assist with verbal cuing for sequencing transfer and hand placement. Pt self propelled WC with decreased fatigue and rest breaks noted. Pt required occasional min assist on uneven surface and verbal cuing to use LUE hand to assist. Upon returning to room pt completed transfer to [facility] with MOD x2 assist. Pt positioned and left with visitor present in room. Patient Diagnosis(es): There were no encounter diagnoses. has a past medical history of Diabetes mellitus type 2 with complications ([address] History of cerebrovascular accident (CVA) with residual deficit, and HTN (hypertension). has no past surgical history on file.     Restrictions  Restrictions/Precautions  Restrictions/Precautions: General Precautions,Isolation,Fall Risk,Contact Precautions  Required Braces or Orthoses?: No  Subjective   General  Chart Reviewed: Yes  Patient assessed for rehabilitation services?: Yes  Family / Caregiver Present: No  Referring Practitioner: Elena Castro PA-C  Diagnosis: Declining functional status  Subjective  Subjective: Pt agreeable to OT services. Pt reports no pain. Orientation     Objective    ADL  Grooming: Contact guard assistance  UE Dressing: Minimal assistance  LE Dressing: Maximum assistance        Functional Mobility  Functional - Mobility Device: Wheelchair  Assist Level: Contact guard assistance  Functional Mobility Comments: 400 feet with CGA occasional min assist secondary to fatigue and uneven surfaces. Bed mobility  Supine to Sit: Minimal assistance  Scooting: Minimal assistance  Transfers  Stand Pivot Transfers: Moderate assistance;2 Person assistance  Sit Pivot Transfers: Moderate assistance;2 Person assistance  Sit to stand: Minimal assistance;2 Person assistance           Plan   Plan  Times per week: 3-5  Times per day: Daily  Plan weeks: 2-4  Current Treatment Recommendations: Strengthening,ROM,Balance Training,Functional Mobility Training,Endurance Training,Equipment Evaluation, Education, & procurement,Neuromuscular Re-education,Self-Care / ADL,Patient/Caregiver Education & Training,Safety Education & Training,Positioning    Goals  Short term goals  Time Frame for Short term goals: 2 weeks  Short term goal 1: pt to complete bed mobility with MOD I. Short term goal 2: pt to complete sliding board transfer with min A. Short term goal 3: pt to complete UB dressing with CGA and LB dressing with min A. Short term goal 4: pt to complete hygiene/grooming with HERNANDEZ seated at EOB with no VC. Short term goal 5: Pt to tolerate sitting at EOB x15 minutes with SBA. Long term goals  Time Frame for Long term goals : 4 weeks  Long term goal 1: pt to complete sliding board transfers with HERNANDEZ. Long term goal 2: Pt to self propel x100 feet with SUP. Long term goal 3: Pt to complete UB dressing with HERNANDEZ and LB dressing with SBA  Long term goal 4: Pt to complete bathing with min A.   Long term goal 5: Pt to demo ability to complete visual scanning with no verbal cuing. Therapy Time   Individual Concurrent Group Co-treatment   Time In 1008         Time Out 1103         Minutes 55              This note serves as a DC summary in the event of pt discharge.      Barbara Aden, OTR/L

## 2022-02-09 NOTE — PROGRESS NOTES
Physical Therapy  Facility/Department: [facility] MED SURG  Daily Treatment Note  NAME: Noreen Luque  : 1965  MRN: [id number]    Date of Service: 2022    Discharge Recommendations:  Continue to assess pending progress      Assessment   Assessment: Cotreated with OT. Patient completed bed mobility tasks with min A and cues for hand/foot placement to establish sitting balance at bedside. Patient stood at bedside twice with Min A of 2 and hemiwalker with cues for widening CAPO and for upright posture. Patient transferred bed to w/c with Mod A of 2. Patient able to propel w/c in the hallway with only occasional cues for negotiating obstacles on her R side. Patient still concerned about getting to [facility]. Patient transferred to the recliner with Mod A of 2.  REQUIRES PT FOLLOW UP: Yes  Activity Tolerance  Activity Tolerance: Patient Tolerated treatment well     Patient Diagnosis(es): There were no encounter diagnoses. has a past medical history of Diabetes mellitus type 2 with complications ([address] History of cerebrovascular accident (CVA) with residual deficit, and HTN (hypertension). has no past surgical history on file. Restrictions  Restrictions/Precautions  Restrictions/Precautions: General Precautions,Isolation,Fall Risk,Contact Precautions  Required Braces or Orthoses?: No  Subjective   General  Chart Reviewed: Yes  Response To Previous Treatment: Patient with no complaints from previous session. Family / Caregiver Present: No  Subjective  Subjective: Patient reports feeling better today.   Pain Screening  Patient Currently in Pain: Denies  Vital Signs  Patient Currently in Pain: Denies    Objective   Bed mobility  Rolling to Right: Minimal assistance  Supine to Sit: Minimal assistance  Scooting: Minimal assistance  Transfers  Sit to Stand: Minimal Assistance;2 Person Assistance  Stand to sit: Minimal Assistance;2 Person Assistance        Balance  Posture: Fair  Sitting - Static: Fair  Sitting - Dynamic: Fair  Standing - Static: Poor  Standing - Dynamic: Poor;-  Exercises  Comments: PROM for R LE      Goals  Long term goals  Time Frame for Long term goals : 4 weeks  Long term goal 1: Patient to achieve bed mobility with mod assist x 1. Long term goal 2: Patient to transfer supine to sit and sit to supine with mod assist x 1. Long term goal 3: Patient to transfer bed to chair with sliding board with mod assist x 1. Long term goal 4: Patient to achieve Fair + static sitting balance. Plan    Plan  Times per week: 4-5 x week  Plan weeks: 4 weeks  Current Treatment Recommendations: Strengthening,Neuromuscular Re-education,Home Exercise Program,Safety Education & Training,Patient/Caregiver Education & Training,Manual Therapy - Soft Tissue Mobilization,Manual Therapy - Joint Manipulation,Functional Mobility Training,Transfer Training,ADL/Self-care Training,Balance Training  Safety Devices  Type of devices: Left in chair,Call light within reach ( present)     Therapy Time   Individual Concurrent Group Co-treatment   Time In 1008         Time Out 36         Minutes 55              This note serves as D/C summary if patient is discharged prior to next visit.   Marshall Manzanares, PTA

## 2022-02-10 LAB
GLUCOSE BLD-MCNC: 128 MG/DL (ref 74–106)
GLUCOSE BLD-MCNC: 149 MG/DL (ref 74–106)
GLUCOSE BLD-MCNC: 176 MG/DL (ref 74–106)
GLUCOSE BLD-MCNC: 186 MG/DL (ref 74–106)
PERFORMED ON: ABNORMAL

## 2022-02-10 PROCEDURE — 97530 THERAPEUTIC ACTIVITIES: CPT

## 2022-02-10 PROCEDURE — 6370000000 HC RX 637 (ALT 250 FOR IP): Performed by: PHYSICIAN ASSISTANT

## 2022-02-10 PROCEDURE — 92507 TX SP LANG VOICE COMM INDIV: CPT

## 2022-02-10 PROCEDURE — 1200000002 HC SEMI PRIVATE SWING BED

## 2022-02-10 PROCEDURE — 6360000002 HC RX W HCPCS: Performed by: PHYSICIAN ASSISTANT

## 2022-02-10 PROCEDURE — 97112 NEUROMUSCULAR REEDUCATION: CPT

## 2022-02-10 RX ADMIN — CARVEDILOL 12.5 MG: 12.5 TABLET, FILM COATED ORAL at 17:41

## 2022-02-10 RX ADMIN — METFORMIN HYDROCHLORIDE 500 MG: 500 TABLET ORAL at 17:41

## 2022-02-10 RX ADMIN — METFORMIN HYDROCHLORIDE 500 MG: 500 TABLET ORAL at 09:13

## 2022-02-10 RX ADMIN — Medication 1 UNITS: at 20:50

## 2022-02-10 RX ADMIN — Medication 1 UNITS: at 12:05

## 2022-02-10 RX ADMIN — LISINOPRIL 20 MG: 20 TABLET ORAL at 09:13

## 2022-02-10 RX ADMIN — ALOGLIPTIN 25 MG: 12.5 TABLET, FILM COATED ORAL at 09:14

## 2022-02-10 RX ADMIN — CARVEDILOL 12.5 MG: 12.5 TABLET, FILM COATED ORAL at 09:13

## 2022-02-10 RX ADMIN — Medication 1 TABLET: at 09:14

## 2022-02-10 RX ADMIN — PANTOPRAZOLE SODIUM 40 MG: 40 TABLET, DELAYED RELEASE ORAL at 09:13

## 2022-02-10 RX ADMIN — ENOXAPARIN SODIUM 40 MG: 100 INJECTION SUBCUTANEOUS at 20:49

## 2022-02-10 RX ADMIN — Medication 12 UNITS: at 20:50

## 2022-02-10 RX ADMIN — ASPIRIN 325 MG ORAL TABLET 325 MG: 325 PILL ORAL at 09:13

## 2022-02-10 RX ADMIN — ATORVASTATIN CALCIUM 80 MG: 80 TABLET, FILM COATED ORAL at 20:49

## 2022-02-10 RX ADMIN — ZINC SULFATE 220 MG (50 MG) CAPSULE 50 MG: CAPSULE at 09:13

## 2022-02-10 RX ADMIN — OXYCODONE HYDROCHLORIDE AND ACETAMINOPHEN 500 MG: 500 TABLET ORAL at 09:13

## 2022-02-10 RX ADMIN — Medication 1 UNITS: at 09:14

## 2022-02-10 RX ADMIN — AMLODIPINE BESYLATE 10 MG: 5 TABLET ORAL at 09:14

## 2022-02-10 ASSESSMENT — PAIN SCALES - GENERAL
PAINLEVEL_OUTOF10: 0
PAINLEVEL_OUTOF10: 0

## 2022-02-10 NOTE — PROGRESS NOTES
[facility]  SPEECH/LANGUAGE PATHOLOGY   INPATIENT DAILY NOTE      [x] Daily           [] Discharge    Patient:Noreen Powers      :1965  JOANA:[id number]  Rehab Dx/Hx: Pneumonia due to COVID-19 virus [U07.1, J12.82]  Declining functional status [R53.81]   No Known Allergies  Precautions: Restrictions/Precautions: General Precautions,Isolation,Fall Risk,Contact Precautions     Home Situation/IADL:   Social/Functional History  Lives With: Spouse  Type of Home: House  Home Layout: Two level,Able to Live on Main level with bedroom/bathroom  Home Access: Stairs to enter without rails  Entrance Stairs - Number of Steps: 2  Bathroom Shower/Tub: Tub/Shower unit,Walk-in shower  Bathroom Toilet: Standard  Bathroom Equipment: Grab bars in shower  ADL Assistance: [address] [place] [address] Avenue: Independent  Homemaking Responsibilities: Yes  Ambulation Assistance: Independent  Transfer Assistance: Independent  Active : Yes  Date of Admit: 2022  Room #: G104/G104-01      Number of Minutes/Billable Intervention  Cog/Memory Deficits     Aphasia/Language     Dysarthria/Speech 30   Apraxia/Speech     Dysphagia/Swallowing     Group     Other    TOTAL Minutes Billed  30    Variance          Date: 2/10/2022  Day of ARU Week:  7       SLP Individual Minutes  Time In: 1:45  Time Out: 2:24  Minutes: 34 [place] in:    [place] out-   [place] total-      Variance/Reason:  [] Refusal due to   [] Medical hold/reason  [] Illness   [] Off Unit for test/procedure  [] Extra time needed to complete task  [] Other (specify)    Activity completed: Patient seated upright in bed, pleasant and agreeable to ST tx. Patient completes expressive language tasks with personal information with 75% accuracy , [place] Members with 72% accuracy, general information questions with 93% accuracy.  Patient continues to present with increased speech intelligibility, Patient continues to present with expressive language deficits and would benefit continued acute rehab services to return to PLOF    Pain: None reported or indicated  Current Diet: ADULT DIET; Regular; 4 carb choices (60 gm/meal); Low Fat/Low Chol/High Fiber/JARROD; Low Sodium (2 gm)  ADULT ORAL NUTRITION SUPPLEMENT; Breakfast, Lunch, Dinner; Diabetic Oral Supplement     Subjective: Patient upright in bed , Patient expressing that she feels better today  Goals and POC: Co-treats where appropriate with PT or OT to facilitate patient goals in functional tasks. , Patient would benefit from continued acute rehab services to return to PLOF    LTG Goal 1: Patient will demonstrate with increased speech intelligibility for increased communcation with wants/needs 100% accuracy    Timeframe for Long-term Goals: 4 weeks      Short-term Goals  Timeframe for Short-term Goals: 2 weeks  Goal 1: Patient will peform oral motor exercises for increased speech intelligibiltity 20/20 x  Goal 2: Patient will demonstrate with increased speech intelligibility for increased communication with wants/needs 90% accuracy  Goal 3: Patient will complete word finding tasks with 80% accuracy over consecutive sessions to improve overall expressive communication skills. Alarm placed: [x]bed []chair   []other:          Barriers to progress:   [] Fatigue        [] Cognitive Deficits   [] Memory Deficits   [] Reduced Attn   [] Self Limiting Behaviors    [] Reduced insight/awareness     [x] Visual Deficits   [] Premorbid Conditions   [] Impulsivity     [] Other    Education/Interventions used this date: Aspiration precaution guidelines/safe swallow strategies   [x] Progress was updated and reviewed with patient and/or family this date.       Interventions used this date:  [x] Speech/Language Treatment    [] Instruction in HEP    Group   [] Dysphagia Treatment   [] Cognitive Skill Imtiaz    Other:         Assessment / Impression Treatment/Activity Tolerance:   [x] Tolerated Treatment well:     [] Patient limited by fatigue/pain:       [] Patient limited by medical complications:    [] Adverse Reaction to Tx:   [] Significant change in status:                      Dorothy Malik Marcos [address], [address] [facility]

## 2022-02-10 NOTE — PROGRESS NOTES
Occupational Therapy  Facility/Department: [facility] MED SURG  Daily Treatment Note  NAME: Noreen Alford  : 1965  MRN: [id number]    Date of Service: 2/10/2022    Discharge Recommendations:  Continue to assess pending progress       Assessment   Assessment: Co tx with PTA. Pt agreeable to OT services. Pt required max verbal and tactile cues for scanning to R side to reach for railing on bed for rolling. Pt completed bed mobility with min A. Pt sat at EOB with CGA. Pt come to stand with min x2 2 trials with support for RLE knee extension. Pt transferred to chair with MOD x2 for squat pivot transfer to L. Pt required assist to pull to  hallway with railing with assist to keep R knee in extension. Pt self propelled 400 feet with multiple rest breaks secondary to fatigue. Pt with decreased activity tolerance on this date. Pt transferred to recliner with MOD x2. Pt left with call light in reach. Patient Diagnosis(es): There were no encounter diagnoses. has a past medical history of Diabetes mellitus type 2 with complications ([address] History of cerebrovascular accident (CVA) with residual deficit, and HTN (hypertension). has no past surgical history on file. Restrictions  Restrictions/Precautions  Restrictions/Precautions: General Precautions,Isolation,Fall Risk,Contact Precautions  Required Braces or Orthoses?: No  Subjective   General  Chart Reviewed: Yes  Patient assessed for rehabilitation services?: Yes  Family / Caregiver Present: No  Referring Practitioner: Jayro Bertrand PA-C  Diagnosis: Declining functional status  Subjective  Subjective: Pt agreeable to OT services. Pt reports no pain.       Orientation     Objective             Functional Mobility  Functional - Mobility Device: Wheelchair  Assist Level: Contact guard assistance  Functional Mobility Comments: 400 feet with multiple rest breaks secondary to fatigue  Bed mobility  Rolling to Right: Minimal assistance  Supine to Sit: Minimal assistance  Scooting: Minimal assistance  Transfers  Stand Pivot Transfers: Moderate assistance;2 Person assistance  Sit Pivot Transfers: Moderate assistance;2 Person assistance  Sit to stand: Minimal assistance;2 Person assistance                                                                 Plan   Plan  Times per week: 3-5  Times per day: Daily  Plan weeks: 2-4  Current Treatment Recommendations: Strengthening,ROM,Balance Training,Functional Mobility Training,Endurance Training,Equipment Evaluation, Education, & procurement,Neuromuscular Re-education,Self-Care / ADL,Patient/Caregiver Education & Training,Safety Education & Training,Positioning    Goals  Short term goals  Time Frame for Short term goals: 2 weeks  Short term goal 1: pt to complete bed mobility with MOD I. Short term goal 2: pt to complete sliding board transfer with min A. Short term goal 3: pt to complete UB dressing with CGA and LB dressing with min A. Short term goal 4: pt to complete hygiene/grooming with HERNANDEZ seated at EOB with no VC. Short term goal 5: Pt to tolerate sitting at EOB x15 minutes with SBA. Long term goals  Time Frame for Long term goals : 4 weeks  Long term goal 1: pt to complete sliding board transfers with HERNANDEZ. Long term goal 2: Pt to self propel x100 feet with SUP. Long term goal 3: Pt to complete UB dressing with HERNANDEZ and LB dressing with SBA  Long term goal 4: Pt to complete bathing with min A. Long term goal 5: Pt to demo ability to complete visual scanning with no verbal cuing. Therapy Time   Individual Concurrent Group Co-treatment   Time In 1016         Time Out 1100         Minutes 44              This note serves as a DC summary in the event of pt discharge.      Barbara Aden OTR/L

## 2022-02-10 NOTE — PROGRESS NOTES
Occupational Therapy  Facility/Department: [facility] MED SURG  Daily Treatment Note  NAME: Noreen Luque  : 1965  MRN: [id number]    Date of Service: 2/10/2022    Discharge Recommendations:  Continue to assess pending progress       Assessment   Assessment: Pt agreeable BID session. Pt continues to feel fatigued throughout day. Pt participated in visual scanning/ comprehension task requiring max assist and max verbal cuing for visual scanning. Pt requiring significantly increased cues compared to previous session. Pt tolerated PROM to RUE with cues for mental practice and encouragement to complete task throughout the day pt voiced understanding. Pt left seated upright in recliner with call light in reach. Patient Diagnosis(es): There were no encounter diagnoses. has a past medical history of Diabetes mellitus type 2 with complications ([address] History of cerebrovascular accident (CVA) with residual deficit, and HTN (hypertension). has no past surgical history on file. Restrictions  Restrictions/Precautions  Restrictions/Precautions: General Precautions,Isolation,Fall Risk,Contact Precautions  Required Braces or Orthoses?: No  Subjective   General  Chart Reviewed: Yes  Patient assessed for rehabilitation services?: Yes  Family / Caregiver Present: No  Referring Practitioner: Anali Stevens PA-C  Diagnosis: Declining functional status  Subjective  Subjective: Pt agreeable to OT services. Pt reports no pain. Orientation     Objective             Functional Mobility  Functional - Mobility Device: Wheelchair  Assist Level: Contact guard assistance  Functional Mobility Comments: 400 feet with multiple rest breaks secondary to fatigue  Bed mobility  Rolling to Right: Minimal assistance  Supine to Sit: Minimal assistance  Scooting: Minimal assistance  Transfers  Stand Pivot Transfers: Moderate assistance;2 Person assistance  Sit Pivot Transfers:  Moderate assistance;2 Person assistance  Sit to stand: Minimal assistance;2 Person assistance           Type of ROM/Therapeutic Exercise  Type of ROM/Therapeutic Exercise: PROM  Exercises  Shoulder Flexion: x10  Shoulder Extension: x10  Shoulder ABduction: x10  Shoulder ADduction: x10  Horizontal ABduction: x10  Horizontal ADduction: x10  Elbow Flexion: x10  Elbow Extension: x10  Supination: x10  Pronation: x10  Wrist Flexion: x10  Wrist Extension: x10  Finger Flexion: x10  Finger Extension: x10     Plan   Plan  Times per week: 3-5  Times per day: Daily  Plan weeks: 2-4  Current Treatment Recommendations: Strengthening,ROM,Balance Training,Functional Mobility Training,Endurance Training,Equipment Evaluation, Education, & procurement,Neuromuscular Re-education,Self-Care / ADL,Patient/Caregiver Education & Training,Safety Education & Training,Positioning    Goals  Short term goals  Time Frame for Short term goals: 2 weeks  Short term goal 1: pt to complete bed mobility with MOD I. Short term goal 2: pt to complete sliding board transfer with min A. Short term goal 3: pt to complete UB dressing with CGA and LB dressing with min A. Short term goal 4: pt to complete hygiene/grooming with HERNANDEZ seated at EOB with no VC. Short term goal 5: Pt to tolerate sitting at EOB x15 minutes with SBA. Long term goals  Time Frame for Long term goals : 4 weeks  Long term goal 1: pt to complete sliding board transfers with HERNANDEZ. Long term goal 2: Pt to self propel x100 feet with SUP. Long term goal 3: Pt to complete UB dressing with HERNANDEZ and LB dressing with SBA  Long term goal 4: Pt to complete bathing with min A. Long term goal 5: Pt to demo ability to complete visual scanning with no verbal cuing. Therapy Time   Individual Concurrent Group Co-treatment   Time In 4563         Time Out 0252         Minutes 28              This note serves as a DC summary in the event of pt discharge.      Barbara Aden OTR/L

## 2022-02-10 NOTE — PROGRESS NOTES
Physical Therapy  Facility/Department: [facility] MED SURG  Daily Treatment Note  NAME: Noreen Mathew  : 1965  MRN: [id number]    Date of Service: 2/10/2022    Discharge Recommendations:  Continue to assess pending progress      Assessment   Assessment: Cotreated with OT. Patient more drowsy today but agreeable to therapy. Completed bed mobility tasks with Max verbal cueing and Min A to transition to sitting. Stood at bedside holding on to [employer] with min A of 2 and with support for R knee. Patient transferred bed to w/c with Mod A of 2. Worked on pulling up to a rail to stand with patient having more difficulty than with pushing up from a transfer surface. Patient propelled w/c in the hallway with occasional cues for negotiating obstacles on her R. Required more rest breaks today due to fatigue. Patient returned to the room and transferred w/c to recliner with Mod A of 2.  REQUIRES PT FOLLOW UP: Yes  Activity Tolerance  Activity Tolerance: Patient Tolerated treatment well;Patient limited by fatigue     Patient Diagnosis(es): There were no encounter diagnoses. has a past medical history of Diabetes mellitus type 2 with complications ([facility] [address] History of cerebrovascular accident (CVA) with residual deficit, and HTN (hypertension). has no past surgical history on file. Restrictions  Restrictions/Precautions  Restrictions/Precautions: General Precautions,Isolation,Fall Risk,Contact Precautions  Required Braces or Orthoses?: No  Subjective   General  Chart Reviewed: Yes  Response To Previous Treatment: Patient with no complaints from previous session. Family / Caregiver Present: No  Subjective  Subjective: Patient reports being sleepy today.       Objective   Bed mobility  Rolling to Right: Minimal assistance  Supine to Sit: Minimal assistance  Scooting: Minimal assistance  Transfers  Sit to Stand: Minimal Assistance;2 Person Assistance  Stand to sit: Minimal Assistance;2 Person Assistance  Bed to Chair: Moderate assistance;2 Person Assistance        Balance  Posture: Fair  Sitting - Static: Fair  Sitting - Dynamic: Fair  Standing - Static: Poor  Standing - Dynamic: Poor;-      Goals  Long term goals  Time Frame for Long term goals : 4 weeks  Long term goal 1: Patient to achieve bed mobility with mod assist x 1. Long term goal 2: Patient to transfer supine to sit and sit to supine with mod assist x 1. Long term goal 3: Patient to transfer bed to chair with sliding board with mod assist x 1. Long term goal 4: Patient to achieve Fair + static sitting balance. Plan    Plan  Times per week: 4-5 x week  Plan weeks: 4 weeks  Current Treatment Recommendations: Strengthening,Neuromuscular Re-education,Home Exercise Program,Safety Education & Training,Patient/Caregiver Education & Training,Manual Therapy - Soft Tissue Mobilization,Manual Therapy - Joint Manipulation,Functional Mobility Training,Transfer Training,ADL/Self-care Training,Balance Training  Safety Devices  Type of devices: Left in chair,Call light within reach     Therapy Time   Individual Concurrent Group Co-treatment   Time In 5958         Time Out 1100         Minutes 44              This note serves as D/C summary if patient is discharged prior to next visit.   Karrie Every, PTA

## 2022-02-11 LAB
GLUCOSE BLD-MCNC: 130 MG/DL (ref 74–106)
GLUCOSE BLD-MCNC: 164 MG/DL (ref 74–106)
GLUCOSE BLD-MCNC: 167 MG/DL (ref 74–106)
GLUCOSE BLD-MCNC: 264 MG/DL (ref 74–106)
PERFORMED ON: ABNORMAL

## 2022-02-11 PROCEDURE — 97530 THERAPEUTIC ACTIVITIES: CPT

## 2022-02-11 PROCEDURE — 92507 TX SP LANG VOICE COMM INDIV: CPT

## 2022-02-11 PROCEDURE — 97110 THERAPEUTIC EXERCISES: CPT

## 2022-02-11 PROCEDURE — 6360000002 HC RX W HCPCS: Performed by: PHYSICIAN ASSISTANT

## 2022-02-11 PROCEDURE — 6370000000 HC RX 637 (ALT 250 FOR IP): Performed by: PHYSICIAN ASSISTANT

## 2022-02-11 PROCEDURE — 99308 SBSQ NF CARE LOW MDM 20: CPT | Performed by: INTERNAL MEDICINE

## 2022-02-11 PROCEDURE — 1200000002 HC SEMI PRIVATE SWING BED

## 2022-02-11 RX ORDER — MECOBALAMIN 5000 MCG
5 TABLET,DISINTEGRATING ORAL NIGHTLY PRN
Status: DISCONTINUED | OUTPATIENT
Start: 2022-02-11 | End: 2022-02-14 | Stop reason: HOSPADM

## 2022-02-11 RX ADMIN — Medication 5 MG: at 23:06

## 2022-02-11 RX ADMIN — Medication 1 UNITS: at 21:21

## 2022-02-11 RX ADMIN — ZINC SULFATE 220 MG (50 MG) CAPSULE 50 MG: CAPSULE at 11:36

## 2022-02-11 RX ADMIN — AMLODIPINE BESYLATE 10 MG: 5 TABLET ORAL at 11:35

## 2022-02-11 RX ADMIN — CARVEDILOL 12.5 MG: 12.5 TABLET, FILM COATED ORAL at 11:35

## 2022-02-11 RX ADMIN — Medication 1 UNITS: at 11:37

## 2022-02-11 RX ADMIN — METFORMIN HYDROCHLORIDE 500 MG: 500 TABLET ORAL at 17:25

## 2022-02-11 RX ADMIN — METFORMIN HYDROCHLORIDE 500 MG: 500 TABLET ORAL at 11:35

## 2022-02-11 RX ADMIN — ASPIRIN 325 MG ORAL TABLET 325 MG: 325 PILL ORAL at 11:35

## 2022-02-11 RX ADMIN — PANTOPRAZOLE SODIUM 40 MG: 40 TABLET, DELAYED RELEASE ORAL at 06:09

## 2022-02-11 RX ADMIN — CARVEDILOL 12.5 MG: 12.5 TABLET, FILM COATED ORAL at 17:24

## 2022-02-11 RX ADMIN — Medication 3 UNITS: at 17:25

## 2022-02-11 RX ADMIN — LISINOPRIL 20 MG: 20 TABLET ORAL at 11:35

## 2022-02-11 RX ADMIN — ATORVASTATIN CALCIUM 80 MG: 80 TABLET, FILM COATED ORAL at 20:27

## 2022-02-11 RX ADMIN — ENOXAPARIN SODIUM 40 MG: 100 INJECTION SUBCUTANEOUS at 20:26

## 2022-02-11 RX ADMIN — Medication 12 UNITS: at 21:20

## 2022-02-11 RX ADMIN — ALOGLIPTIN 25 MG: 12.5 TABLET, FILM COATED ORAL at 11:35

## 2022-02-11 RX ADMIN — Medication 1 TABLET: at 11:35

## 2022-02-11 RX ADMIN — OXYCODONE HYDROCHLORIDE AND ACETAMINOPHEN 500 MG: 500 TABLET ORAL at 11:35

## 2022-02-11 ASSESSMENT — PAIN SCALES - GENERAL
PAINLEVEL_OUTOF10: 0

## 2022-02-11 NOTE — PROGRESS NOTES
Occupational Therapy  Facility/Department: [facility] MED SURG  Daily Treatment Note 14 day progress note  NAME: Noreen Dhaliwal  : 1965  MRN: [id number]    Date of Service: 2022    Discharge Recommendations:  Continue to assess pending progress       Assessment   Assessment: Pt agreeable to OT services. Pt come to sit at EOB with min assist decreased verbal cuing needed on this date for bed mobility techniques. OT facilitated RUE PROM for increased flexibility. Pt tolerated well no c/o pain. Pt come to stand x2 trials with fatifue noted requiring assist to immobilize knee. Pt able to come to stand with min x2 with tactile and  verbal cuing to correct balance. Pt transferred from bed to chair completing squat pivot transfer with MOD x2 assist. Pt able to doff sock on LLE foot with LOB and CGA. Pt required max assist to don shoes. Pt self propelled in [facility] with decreased rest breaks x200 feet x2. Pt with no rest breaks needed on this date and no assist to initiate propulsion. Pt tolerated well. pt transferred from [facility] to recliner with max x2. Pt left in chair with call light in reach. Pt is making steady progress towards goals. Pt continues to progress with static sitting balance. Demo improved ability to tolerate sitting with decreased level of assist to maintain balance. Pt demo ability to come to sit at EOB with min assist with verbal cuing using tee technique. Pt requiring min A for UB dressing with cues for tee technique. Pt demo ability to self propel in chair with verbal cuing for scanning to R to decrease bumping into objects in hallway/room. Pt has difficulty in close quarter areas. Pt continues to await transfer process of [address] [place] [address] at this time. Pt will benefit from transfer to facility to continue improved progress towards goals and independence. Pt has strong family/social support. Pt is expected to make continued progress towards self care and mobility goals. She is motivated.  Pt continues to be significantly limited by visual impairments and flaccidity of RUE/RLE. Patient Diagnosis(es): There were no encounter diagnoses. has a past medical history of Diabetes mellitus type 2 with complications ([address] History of cerebrovascular accident (CVA) with residual deficit, and HTN (hypertension). has no past surgical history on file. Restrictions  Restrictions/Precautions  Restrictions/Precautions: General Precautions,Isolation,Fall Risk,Contact Precautions  Required Braces or Orthoses?: No  Subjective   General  Chart Reviewed: Yes  Patient assessed for rehabilitation services?: Yes  Family / Caregiver Present: No  Referring Practitioner: Facundo So PA-C  Diagnosis: Declining functional status  Subjective  Subjective: Pt agreeable to OT services. Pt reports no pain. Orientation     Objective    ADL  LE Dressing: Maximum assistance        Functional Mobility  Functional - Mobility Device: Wheelchair  Assist Level: Stand by assistance  Functional Mobility Comments: 200 feet x2  Bed mobility  Rolling to Right: Minimal assistance  Supine to Sit: Minimal assistance  Scooting: Minimal assistance  Transfers  Stand Pivot Transfers: Moderate assistance;2 Person assistance  Sit Pivot Transfers:  Moderate assistance;2 Person assistance  Sit to stand: Minimal assistance;2 Person assistance     Type of ROM/Therapeutic Exercise  Type of ROM/Therapeutic Exercise: PROM  Exercises  Shoulder Elevation: x10  Shoulder Flexion: x10  Shoulder Extension: x10  Shoulder ABduction: x10  Shoulder ADduction: x10  Horizontal ABduction: x10  Horizontal ADduction: x10  Elbow Flexion: x10  Elbow Extension: x10  Supination: x10  Pronation: x10  Wrist Flexion: x10  Wrist Extension: x10  Finger Flexion: x10  Finger Extension: x10  LUE AROM (degrees)  LUE AROM : WFL  RUE PROM (degrees)  RUE PROM: [facility]                 Plan   Plan  Times per week: 3-5  Times per day: Daily  Plan weeks: 2-4  Current Treatment Recommendations: Strengthening,ROM,Balance Training,Functional Mobility Training,Endurance Training,Equipment Evaluation, Education, & procurement,Neuromuscular Re-education,Self-Care / ADL,Patient/Caregiver Education & Training,Safety Education & Training,Positioning    Goals  Short term goals  Time Frame for Short term goals: 2 weeks  Short term goal 1: pt to complete bed mobility with MOD I. PROGRESSING  Short term goal 2: pt to complete sliding board transfer with min A. PROGRESSING completing squat pivot transfer min x2  Short term goal 3: pt to complete UB dressing with CGA and LB dressing with min A. PROGRESSING  Short term goal 4: pt to complete hygiene/grooming with HERNANDEZ seated at EOB with no VC. PROGRESSING  Short term goal 5: Pt to tolerate sitting at EOB x15 minutes with SBA. PROGRESSING  Long term goals  Time Frame for Long term goals : 4 weeks  Long term goal 1: pt to complete sliding board transfers with HERNANDEZ. Long term goal 2: Pt to self propel x100 feet with SUP. Long term goal 3: Pt to complete UB dressing with HERNANDEZ and LB dressing with SBA  Long term goal 4: Pt to complete bathing with min A. Long term goal 5: Pt to demo ability to complete visual scanning with no verbal cuing. Therapy Time   Individual Concurrent Group Co-treatment   Time In 4556         Time Out 9787         Minutes 48              This note serves as a DC summary in the event of pt discharge.      LUISITO Ac/L

## 2022-02-11 NOTE — PROGRESS NOTES
Occupational Therapy  Facility/Department: [facility] MED SURG  Daily Treatment Note  NAME: Noreen Arredondo  : 1965  MRN: [id number]    Date of Service: 2022    Discharge Recommendations:  Continue to assess pending progress    Assessment  Pt seen for BID session. OT administered SLUMS cognitive assessment; pt scored 19 which indicates moderate cognitive deficits post CVA. Questions answered w most difficulty addressed short term memory, sequencing steps, processing speed, & visual perception. Pt able to answer questions addressing safety scenarios appropriately. OT provided pt education about assessment findings; pt expressed understanding. OT provided weekend program handouts to address concerns. Pt in bedside chair, call bell in reach, pressure alarm on, all needs met. Patient Diagnosis(es): There were no encounter diagnoses. has a past medical history of Diabetes mellitus type 2 with complications ([address] History of cerebrovascular accident (CVA) with residual deficit, and HTN (hypertension). has no past surgical history on file. Restrictions: General Precautions,Isolation,Fall Risk,Contact Precautions    Subjective  Subjective: Pt agreeable to OT services. Pt reports no pain. Objective  SLUMS Cognitive Assessment: 19  Safety Scenarios Activity  Weekend Program Issued     Plan  Times per week: 3-5  Times per day: Daily  Plan weeks: 2-4  Current Treatment Recommendations: Strengthening,ROM,Balance Training,Functional Mobility Training,Endurance Training,Equipment Evaluation, Education, & procurement,Neuromuscular Re-education,Self-Care / ADL,Patient/Caregiver Education & Training,Safety Education & Training,Positioning    Goals  Short term goals  Time Frame for Short term goals: 2 weeks  Short term goal 1: pt to complete bed mobility with MOD I. Short term goal 2: pt to complete sliding board transfer with min A.   Short term goal 3: pt to complete UB dressing with CGA and LB dressing with min A. Short term goal 4: pt to complete hygiene/grooming with HERNANDEZ seated at EOB with no VC. Short term goal 5: Pt to tolerate sitting at EOB x15 minutes with SBA. Long term goals  Time Frame for Long term goals : 4 weeks  Long term goal 1: pt to complete sliding board transfers with HERNANDEZ. Long term goal 2: Pt to self propel x100 feet with SUP. Long term goal 3: Pt to complete UB dressing with HERNANDEZ and LB dressing with SBA  Long term goal 4: Pt to complete bathing with min A. Long term goal 5: Pt to demo ability to complete visual scanning with no verbal cuing. Therapy Time   Individual   Time In 1233   Time Out 107   Minutes 34     This note serves as a DC summary in the event of pt discharge. Iwona Barahona OTR/L    Certification of Medical Necessity: It will be understood that this treatment plan is certified medically necessary by the documenting therapist and referring physician mentioned in this report. Unless the physician indicated otherwise through written correspondence with our office, all further referrals will act as certification of medical necessity on this treatment plan. Thank you for this referral.  If you have questions regarding this plan of care, please call [phone].

## 2022-02-11 NOTE — CARE COORDINATION
Vitals:    02/09/22 2001 02/10/22 0750 02/10/22 2034 02/11/22 0827   BP: 126/75 (!) 141/81 114/63 120/71   Pulse: 65 75 66 64   Resp: 20 18 18 20   Temp: 97.6 °F (36.4 °C) 97.7 °F (36.5 °C) 98.3 °F (36.8 °C) 98.1 °F (36.7 °C)   TempSrc: Oral Oral Oral Oral   SpO2: 99% 95% 99% 98%   Weight:       Height:           Current Facility-Administered Medications:     carvedilol (COREG) tablet 12.5 mg, 12.5 mg, Oral, BID WC, ALEJANDRO Vidal, 12.5 mg at 02/11/22 1135    insulin glargine (LANTUS) injection vial 12 Units, 12 Units, SubCUTAneous, Nightly, ALEJANDRO Harris, 12 Units at 02/10/22 2050    amLODIPine (NORVASC) tablet 10 mg, 10 mg, Oral, Daily, ALEJANDRO Purdy, 10 mg at 02/11/22 1135    pantoprazole (PROTONIX) tablet 40 mg, 40 mg, Oral, QAM AC, ALEJANDRO Purdy, 40 mg at 02/11/22 0609    vitamin D (ERGOCALCIFEROL) capsule 50,000 Units, 50,000 Units, Oral, Weekly, ALEJANDRO Harris, 50,000 Units at 01/31/22 1718    zinc sulfate (ZINCATE) capsule 50 mg, 50 mg, Oral, Daily, Aura Loco, PA, 50 mg at 02/11/22 1136    ascorbic acid (VITAMIN C) tablet 500 mg, 500 mg, Oral, Daily, Vergia Line, PA, 500 mg at 02/11/22 1135    therapeutic multivitamin-minerals 1 tablet, 1 tablet, Oral, Daily, Aura Loco PA, 1 tablet at 02/11/22 1135    alogliptin (NESINA) tablet 25 mg, 25 mg, Oral, Daily, Vergia Line, PA, 25 mg at 02/11/22 1135    benzonatate (TESSALON) capsule 100 mg, 100 mg, Oral, TID PRN, Aura Loco, PA    aspirin tablet 325 mg, 325 mg, Oral, Daily, Vergia Line, PA, 325 mg at 02/11/22 1135    atorvastatin (LIPITOR) tablet 80 mg, 80 mg, Oral, Nightly, Vergia Line, PA, 80 mg at 02/10/22 2049    metFORMIN (GLUCOPHAGE) tablet 500 mg, 500 mg, Oral, BID WC, Vergia Line, PA, 500 mg at 02/11/22 1135    lisinopril (PRINIVIL;ZESTRIL) tablet 20 mg, 20 mg, Oral, Daily, Vergia Line, PA, 20 mg at 02/11/22 1135    enoxaparin (LOVENOX) injection 40 mg, 40 mg, SubCUTAneous, Daily, Vergia Line, PA, 40 mg at 02/10/22 2049    ondansetron (ZOFRAN-ODT) disintegrating tablet 4 mg, 4 mg, Oral, Q8H PRN **OR** ondansetron (ZOFRAN) injection 4 mg, 4 mg, IntraVENous, Q6H PRN, ALEJANDRO Flores    polyethylene glycol (GLYCOLAX) packet 17 g, 17 g, Oral, Daily PRN, ALEJANDRO Flores    acetaminophen (TYLENOL) tablet 650 mg, 650 mg, Oral, Q6H PRN **OR** acetaminophen (TYLENOL) suppository 650 mg, 650 mg, Rectal, Q6H PRN, ALEJANDRO Flores    insulin lispro (HUMALOG) injection vial 0-6 Units, 0-6 Units, SubCUTAneous, TID WC, ALEJANDRO Flores, 1 Units at 02/11/22 1137    insulin lispro (HUMALOG) injection vial 0-3 Units, 0-3 Units, SubCUTAneous, Nightly, [facility], 1 Units at 02/10/22 2050    glucose (GLUTOSE) 40 % oral gel 15 g, 15 g, Oral, PRN, ALEJANDRO Flores    dextrose 50 % IV solution, 12.5 g, IntraVENous, PRN, ALEJANDRO Flores    glucagon (rDNA) injection 1 mg, 1 mg, IntraMUSCular, PRN, ALEJANDRO Flores    dextrose 5 % solution, 100 mL/hr, IntraVENous, PRN, ALEJANDRO Flores    hydrALAZINE (APRESOLINE) tablet 25 mg, 25 mg, Oral, TID PRN, ALEJANDRO Flores, 25 mg at 02/03/22 0130

## 2022-02-11 NOTE — PROGRESS NOTES
[facility]  SPEECH/LANGUAGE PATHOLOGY   INPATIENT DAILY NOTE      [x] Daily           [] Discharge    Patient:Noreen Pedroza Persons      :1965  HRW:[id number]  Rehab Dx/Hx: Pneumonia due to COVID-19 virus [U07.1, J12.82]  Declining functional status [R53.81]   No Known Allergies  Precautions: Restrictions/Precautions: General Precautions,Isolation,Fall Risk,Contact Precautions     Home Situation/IADL:   Social/Functional History  Lives With: Spouse  Type of Home: House  Home Layout: Two level,Able to Live on Main level with bedroom/bathroom  Home Access: Stairs to enter without rails  Entrance Stairs - Number of Steps: 2  Bathroom Shower/Tub: Tub/Shower unit,Walk-in shower  Bathroom Toilet: Standard  Bathroom Equipment: Grab bars in shower  ADL Assistance: [address] [place] [address] Avenue: Independent  Homemaking Responsibilities: Yes  Ambulation Assistance: Independent  Transfer Assistance: Independent  Active : Yes  Date of Admit: 2022  Room #: G104/G104-01      Number of Minutes/Billable Intervention  Cog/Memory Deficits     Aphasia/Language     Dysarthria/Speech 30   Apraxia/Speech     Dysphagia/Swallowing     Group     Other    TOTAL Minutes Billed  30    Variance          Date: 2022  Day of ARU Week:  1       SLP Individual Minutes  Time In: 9:30   Time Out: 10:00  Minutes: 30 [place] in:    [place] out-   [place] total-      Variance/Reason:  [] Refusal due to   [] Medical hold/reason  [] Illness   [] Off Unit for test/procedure  [] Extra time needed to complete task  [] Other (specify)    Activity completed: Patient seated upright in bed, pleasant and agreeable to ST tx. Patient presents with increased fatigue this day, patient expressing that she did not sleep well. Patient completes expressive language tasks with personal information with 75% accuracy ,[place] Members with 60%  accuracy, general information questions with 90% accuracy. Patient continues to present with increased speech intelligibility,Patient presents with a decline with concrete member tasks due to increased fatigue. Patient continues to present with expressive language deficits and would benefit continued acute rehab services to return to [facility]    Pain: None reported or indicated  Current Diet: ADULT DIET; Regular; 4 carb choices (60 gm/meal); Low Fat/Low Chol/High Fiber/JARROD; Low Sodium (2 gm)  ADULT ORAL NUTRITION SUPPLEMENT; Breakfast, Lunch, Dinner; Diabetic Oral Supplement     Subjective: Patient upright in bed , Patient expressing that she feels better today  Goals and POC: Co-treats where appropriate with PT or OT to facilitate patient goals in functional tasks. , Patient would benefit from continued acute rehab services to return to OF    LTG Goal 1: Patient will demonstrate with increased speech intelligibility for increased communcation with wants/needs 100% accuracy    Timeframe for Long-term Goals: 4 weeks      Short-term Goals  Timeframe for Short-term Goals: 2 weeks  Goal 1: Patient will peform oral motor exercises for increased speech intelligibiltity 20/20 x  Goal 2: Patient will demonstrate with increased speech intelligibility for increased communication with wants/needs 90% accuracy  Goal 3: Patient will complete word finding tasks with 80% accuracy over consecutive sessions to improve overall expressive communication skills. Alarm placed: [x]bed []chair   []other:          Barriers to progress:   [x] Fatigue        [] Cognitive Deficits   [] Memory Deficits   [] Reduced Attn   [] Self Limiting Behaviors    [] Reduced insight/awareness     [x] Visual Deficits   [] Premorbid Conditions   [] Impulsivity     [] Other    Education/Interventions used this date: Aspiration precaution guidelines/safe swallow strategies   [x] Progress was updated and reviewed with patient and/or family this date.       Interventions used this date:  [x] Speech/Language Treatment    [] Instruction in HEP    Group   [] Dysphagia Treatment   [] Cognitive Skill Imtiaz    Other:         Assessment / Impression                                                          Treatment/Activity Tolerance:   [x] Tolerated Treatment well:     [x] Patient limited by fatigue/pain:    Patient limited this day due to increased fatigue   [] Patient limited by medical complications:    [] Adverse Reaction to Tx:   [] Significant change in status:                      Dipak Malik [address], [address] [facility]

## 2022-02-11 NOTE — PROGRESS NOTES
Progress Note      Subjective:   Chief complaint: declining functional status    Interval History:   Resting in bed this morning. Denies acute complaint. Reports having regular BMs. Appetite stable. She is working with PT, OT, SLP. Patient and her family remain hopeful she will be transferred to [facility] for more intensive rehab course. Review of systems:   Constitutional:  Denies fever or chills. Eyes:  Denies change in visual acuity or discharge. HENT:  Denies nasal congestion or sore throat. Respiratory:  Denies cough or shortness of breath. Cardiovascular:  Denies chest pain, palpitation or swelling in LEs. GI:  Denies abdominal pain, nausea, vomiting, bloody stools or diarrhea. :  Denies dysuria or frequency. Positive for urinary retention with silva catheter in place  Musculoskeletal:  Denies back pain or joint pain. Integument:  Denies rash or itching. Neurologic:  Denies headache or sensory changes. Positive for right sided hemiplegia and word finding diffulty  Psychiatric:  Denies depression or anxiety. Past medical history, surgical history, family history and social history reviewed and unchanged compared to H&P earlier this admission.     Medications:   Scheduled Meds:   carvedilol  12.5 mg Oral BID WC    insulin glargine  12 Units SubCUTAneous Nightly    amLODIPine  10 mg Oral Daily    pantoprazole  40 mg Oral QAM AC    vitamin D  50,000 Units Oral Weekly    zinc sulfate  50 mg Oral Daily    ascorbic acid  500 mg Oral Daily    multivitamin  1 tablet Oral Daily    alogliptin  25 mg Oral Daily    aspirin  325 mg Oral Daily    atorvastatin  80 mg Oral Nightly    metFORMIN  500 mg Oral BID WC    lisinopril  20 mg Oral Daily    enoxaparin  40 mg SubCUTAneous Daily    insulin lispro  0-6 Units SubCUTAneous TID WC    insulin lispro  0-3 Units SubCUTAneous Nightly     Continuous Infusions:   dextrose         Objective:     Vital Signs  Temp: 98.1 °F (36.7 °C)  Pulse: 64  Resp: 20  BP: 120/71  SpO2: 98 %  O2 Device: None (Room air)       Vital signs reviewed in electronic charts. Physical exam  Constitutional:  Well developed, well nourished, no acute distress. Lying in bed. Eyes:  conjunctiva normal, no scleral icterus. Right sided hemiopia. HENT:  Atraumatic, external ears normal, external nose/nares normal, oropharynx moist, no pharyngeal exudates. Neck:  Supple. No JVD or thyromegaly. Respiratory:  No respiratory distress, normal breath sounds, no rales, no wheezing. Cardiovascular:  Normal rate, normal rhythm, no murmurs, no gallops, no rubs. GI:  Soft, nondistended, normal bowel sounds, nontender, no organomegaly, no mass. Musculoskeletal:  no tenderness, no obvious deformities. Full function of LUE and LLE. Right sided hemiplegia. Trace edema in right hand and RLE.    Integument:  Well hydrated, no rash. Neurologic:  Alert & oriented x 3. Right sided hemiplegia. Right sided partial hemianopia. Normal sensation RUE and RLE. Slight word finding difficulty. Speech slightly slurred. Psychiatric:  Speech slightly slurred. Behavior appropriate.       Results:     Lab Results   Component Value Date    WBC 10.7 02/05/2022    HGB 14.7 02/05/2022    HCT 42.0 02/05/2022    MCV 90.9 02/05/2022     02/05/2022       Lab Results   Component Value Date     02/05/2022    K 3.6 02/05/2022    CL 98 02/05/2022    CO2 23 02/05/2022    BUN 10 02/05/2022    CREATININE <0.5 02/05/2022    GLUCOSE 183 02/05/2022    CALCIUM 9.2 02/05/2022        Assessment and Plan:      UTI (urinary tract infection) [N39.0]  -UA 2/3 c/w UTI; UCx grew E.coli; sensitivities pending.   -Started Rocephin with improvement of MS on 2/3. Transitioned to [facility] on 2/6 to complete antibiotic course.     02/04/2022    Acute encephalopathy [G93.40]  -episode of acute encephalopathy on 2/3; CT head repeated 2/3 with no acute intracranial abnormality.  Evidence of prior infarction in the posterior limb of the left internal capsule with extension into the medial left temporal lobe. Recommend correlation with outside CT from 1/26/22 (not available for review in the system). -per OSH records, initial CT head unremarkable  -UA c/w UTI; MS improved after initiation of Rocephin  -Marybeth-Hill reviewed CT findings from 2/3 with [facility] Neuro Stroke LESA Vandana Gómez). CVA expansion noted is likely reflective of completion of the recent CVA and not new CVA, since pt's MS have improved with tx of UTI. - mental status has been stable with recurrence- monitor closely   02/04/2022    Acute CVA (cerebrovascular accident) ([facility] [place] [address] [I63.9]  -Dx with COVID on 1/24. Vaccinated + booster.  -presented to OSH on 1/26 with right sided weakness, dysarthria and profound HTN (). Initial NIHSS unclear.  -per OSH records, initial CT head 1/26 was unremarkable. MRI brain w/o contrast revealed an acute left basal ganglia CVA. CTA H/N neg for LVO or significant stenosis. Repeat CT head demonstrated a focal infarct of the left internal capsule. Teleneurology was consulted and pt was deemed outside the window for TPA. -OSH echo w/bubble study: negative, with findings of LVH c/w uncontrolled HTN  -OSH SLP, PT OT with recs for IP skilled rehab and pt was transferred here for swing bed  -PT OT following. Pt continues to have right sided hemiplegia, right sided parital hemianopia, slight slurred speech and slight word finding difficulty. Current NIHSS 15.  -pt would benefit from acute rehab; referral sent to [facility]   -continue PT OT  -SLP following  -diet: regular, thin liquids  -continue  mg daily and lipitor 80 mg QHS per Neurology  -avoid hypotension    01/29/2022    Hypertension [I10]  -with HTN emergency at OSH at time of CVA on 1/26 with 's, requiring Cardene gtt. Was not taking any anti-HTN meds prior to dx of CVA. -titrated BP regimen this admission. -160 over the past 24 hours.  Continue norvasc 10mg daily, coreg 12.5mg BID, lisinopril 20mg daily and PRN hydralazine.  -monitor and titrate as needed. Goal -150.    01/29/2022    Type 2 diabetes mellitus ([facility] [address] [E11.9]  -newly dx (at OSH)  -A1c 12.3  -BGs controlled with current regimen 01/29/2022    Hypokalemia [E87.6]  -replaced  -monitor and replete PRN    01/29/2022    COVID-19 [U07.1]  -dx on 1/24; mild sxs at time of dx  -fully vaccinated + booster  -taken out of precautions on 2/2 01/29/2022    Urinary retention [R33.9]  -per [employer]: \"After a stroke, the brain may enter into a temporary acute cerebral shock phase. During this time, the urinary bladder will be in retentiondetrusor areflexia. Almost 25% of affected individuals develop acute urinary retention after a stroke. After the cerebral shock phase wears off, the bladder demonstrates detrusor hyperreflexia with coordinated urethral sphincter activity. This occurs because the [facility] is released from the cerebral inhibitory center. Patients with detrusor hyperreflexia complain of urinary frequency, urinary urgency, and urge incontinence. The treatment for the cerebral shock phase is indwelling Silva catheter placement or clean intermittent catheterization (CIC). Detrusor hyperreflexia is treated with anticholinergic medications to facilitate bladder filling and storage. \"  -suspect due to neurogenic bladder from recent CVA; given CVA was recent, this could be temporary.   -failed voiding trial on 2/4 and silva was re-anchored  -plan to re-attempt voiding trial 2/12  -after silav is successfully removed, pt will need to be monitored closely for signs of urinary frequency, urinary urgency, and urge incontinence.  If those sxs develop, will need to start anticholinergic medications.     01/29/2022    Declining functional status [R53.81]  -due to recent CVA with residual right sided hemiplegia, right sided partial hemianopia, slight slurred speech and slight word finding difficulty (improving)  - continue pt/ot  - referred to [facility] for acute rehab and waiting to hear about acceptance         Patient was seen and examined with Dr. Hai Greer. After reviewing patient data and diagnostic testing the plan of care was established in conjunction with Dr. Hai Greer.     Electronically signed by ALEJANDRO Cai on 2/11/2022 at 2:16 PM

## 2022-02-11 NOTE — PROGRESS NOTES
Physical Therapy  Facility/Department: [facility] MED SURG  Daily Treatment Note  NAME: Noreen Veronica   : 1965  MRN: [id number]    Date of Service: 2022    Discharge Recommendations:  Continue to assess pending progress      Assessment   Assessment: Cotreated with OT. Patient transitioned supine to sit with verbal cues and Min A. Stood at bedside with Min A of 2 and support for R knee. Patient fatigues easily while standing. Patient transferred squat pivot from bed to w/c with Mod A of 2. Propelled w/c in the hallway with only occasional cues to scan for obstacles on her R side. Patient transferred stand pivot from w/c to recliner with Max A of 2.  REQUIRES PT FOLLOW UP: Yes  Activity Tolerance  Activity Tolerance: Patient Tolerated treatment well     Patient Diagnosis(es): There were no encounter diagnoses. has a past medical history of Diabetes mellitus type 2 with complications ([address] History of cerebrovascular accident (CVA) with residual deficit, and HTN (hypertension). has no past surgical history on file. Restrictions  Restrictions/Precautions  Restrictions/Precautions: General Precautions,Isolation,Fall Risk,Contact Precautions  Required Braces or Orthoses?: No  Subjective   General  Chart Reviewed: Yes  Response To Previous Treatment: Patient with no complaints from previous session. Family / Caregiver Present: No  Subjective  Subjective: Patient reports not sleeping well last night.   Pain Screening  Patient Currently in Pain: Denies  Vital Signs  Patient Currently in Pain: Denies       Objective   Bed mobility  Rolling to Right: Minimal assistance  Supine to Sit: Minimal assistance  Scooting: Minimal assistance  Transfers  Sit to Stand: Minimal Assistance;2 Person Assistance  Stand to sit: Minimal Assistance;2 Person Assistance  Bed to Chair: Moderate assistance;Maximum assistance;2 Person Assistance        Balance  Posture: Fair  Sitting - Static: Fair  Sitting - Dynamic: Fair  Standing - Static: Poor  Standing - Dynamic: Poor;-      Goals  Long term goals  Time Frame for Long term goals : 4 weeks  Long term goal 1: Patient to achieve bed mobility with mod assist x 1. Long term goal 2: Patient to transfer supine to sit and sit to supine with mod assist x 1. Long term goal 3: Patient to transfer bed to chair with sliding board with mod assist x 1. Long term goal 4: Patient to achieve Fair + static sitting balance. Plan    Plan  Times per week: 4-5 x week  Plan weeks: 4 weeks  Current Treatment Recommendations: Strengthening,Neuromuscular Re-education,Home Exercise Program,Safety Education & Training,Patient/Caregiver Education & Training,Manual Therapy - Soft Tissue Mobilization,Manual Therapy - Joint Manipulation,Functional Mobility Training,Transfer Training,ADL/Self-care Training,Balance Training  Safety Devices  Type of devices: Left in chair,Call light within reach     Therapy Time   Individual Concurrent Group Co-treatment   Time In 5162         Time Out 5350         Minutes 48              This note serves as D/C summary if patient is discharged prior to next visit.   Sarah Maradiaga, PTA

## 2022-02-12 LAB
GLUCOSE BLD-MCNC: 126 MG/DL (ref 74–106)
GLUCOSE BLD-MCNC: 143 MG/DL (ref 74–106)
GLUCOSE BLD-MCNC: 158 MG/DL (ref 74–106)
GLUCOSE BLD-MCNC: 170 MG/DL (ref 74–106)
PERFORMED ON: ABNORMAL

## 2022-02-12 PROCEDURE — 6360000002 HC RX W HCPCS: Performed by: PHYSICIAN ASSISTANT

## 2022-02-12 PROCEDURE — 1200000002 HC SEMI PRIVATE SWING BED

## 2022-02-12 PROCEDURE — 6370000000 HC RX 637 (ALT 250 FOR IP): Performed by: PHYSICIAN ASSISTANT

## 2022-02-12 PROCEDURE — 51798 US URINE CAPACITY MEASURE: CPT | Performed by: NURSE PRACTITIONER

## 2022-02-12 RX ADMIN — Medication 1 UNITS: at 22:06

## 2022-02-12 RX ADMIN — ATORVASTATIN CALCIUM 80 MG: 80 TABLET, FILM COATED ORAL at 22:05

## 2022-02-12 RX ADMIN — Medication 12 UNITS: at 22:05

## 2022-02-12 RX ADMIN — Medication 5 MG: at 22:05

## 2022-02-12 RX ADMIN — Medication 1 UNITS: at 09:25

## 2022-02-12 RX ADMIN — PANTOPRAZOLE SODIUM 40 MG: 40 TABLET, DELAYED RELEASE ORAL at 06:04

## 2022-02-12 RX ADMIN — ASPIRIN 325 MG ORAL TABLET 325 MG: 325 PILL ORAL at 09:22

## 2022-02-12 RX ADMIN — OXYCODONE HYDROCHLORIDE AND ACETAMINOPHEN 500 MG: 500 TABLET ORAL at 09:23

## 2022-02-12 RX ADMIN — METFORMIN HYDROCHLORIDE 500 MG: 500 TABLET ORAL at 09:22

## 2022-02-12 RX ADMIN — Medication 1 UNITS: at 17:46

## 2022-02-12 RX ADMIN — LISINOPRIL 20 MG: 20 TABLET ORAL at 09:22

## 2022-02-12 RX ADMIN — Medication 1 TABLET: at 09:23

## 2022-02-12 RX ADMIN — ZINC SULFATE 220 MG (50 MG) CAPSULE 50 MG: CAPSULE at 09:22

## 2022-02-12 RX ADMIN — CARVEDILOL 12.5 MG: 12.5 TABLET, FILM COATED ORAL at 17:42

## 2022-02-12 RX ADMIN — ENOXAPARIN SODIUM 40 MG: 100 INJECTION SUBCUTANEOUS at 22:05

## 2022-02-12 RX ADMIN — ALOGLIPTIN 25 MG: 12.5 TABLET, FILM COATED ORAL at 09:22

## 2022-02-12 RX ADMIN — METFORMIN HYDROCHLORIDE 500 MG: 500 TABLET ORAL at 17:42

## 2022-02-12 RX ADMIN — AMLODIPINE BESYLATE 10 MG: 5 TABLET ORAL at 09:23

## 2022-02-12 RX ADMIN — CARVEDILOL 12.5 MG: 12.5 TABLET, FILM COATED ORAL at 09:22

## 2022-02-12 ASSESSMENT — PAIN SCALES - GENERAL: PAINLEVEL_OUTOF10: 0

## 2022-02-12 NOTE — PROGRESS NOTES
Patient requesting PRN sleep aide. Verbal order for Melatonin 5mg by Michell Donnelly. Ambisinistered to pt.

## 2022-02-12 NOTE — PROGRESS NOTES
Patient with no UOP, bladder scan was [address] PA informed, advised nursing to continue to bladder scan patient every few hours if bladder scan >600mls or if >150ml after urination anchor silva catheter.

## 2022-02-12 NOTE — PROGRESS NOTES
Catheter removed. 300ml in catheter bag. 12ml residual noted after removal. Will continue to monitor.

## 2022-02-13 LAB
ANION GAP SERPL CALCULATED.3IONS-SCNC: 11 MMOL/L (ref 3–16)
BUN BLDV-MCNC: 18 MG/DL (ref 6–20)
CALCIUM SERPL-MCNC: 9.6 MG/DL (ref 8.5–10.5)
CHLORIDE BLD-SCNC: 101 MMOL/L (ref 98–107)
CO2: 25 MMOL/L (ref 20–30)
CREAT SERPL-MCNC: <0.5 MG/DL (ref 0.4–1.2)
GFR AFRICAN AMERICAN: >59
GFR NON-AFRICAN AMERICAN: >60
GLUCOSE BLD-MCNC: 101 MG/DL (ref 74–106)
GLUCOSE BLD-MCNC: 116 MG/DL (ref 74–106)
GLUCOSE BLD-MCNC: 119 MG/DL (ref 74–106)
GLUCOSE BLD-MCNC: 192 MG/DL (ref 74–106)
GLUCOSE BLD-MCNC: 208 MG/DL (ref 74–106)
HCT VFR BLD CALC: 40 % (ref 37–47)
HEMOGLOBIN: 13.6 G/DL (ref 11.5–16.5)
MCH RBC QN AUTO: 32.1 PG (ref 27–32)
MCHC RBC AUTO-ENTMCNC: 34 G/DL (ref 31–35)
MCV RBC AUTO: 94.3 FL (ref 80–100)
PDW BLD-RTO: 11.6 % (ref 11–16)
PERFORMED ON: ABNORMAL
PERFORMED ON: NORMAL
PLATELET # BLD: 269 K/UL (ref 150–400)
PMV BLD AUTO: 9.8 FL (ref 6–10)
POTASSIUM SERPL-SCNC: 4.1 MMOL/L (ref 3.4–5.1)
RBC # BLD: 4.24 M/UL (ref 3.8–5.8)
SODIUM BLD-SCNC: 137 MMOL/L (ref 136–145)
WBC # BLD: 8.2 K/UL (ref 4–11)

## 2022-02-13 PROCEDURE — 1200000002 HC SEMI PRIVATE SWING BED

## 2022-02-13 PROCEDURE — 85027 COMPLETE CBC AUTOMATED: CPT

## 2022-02-13 PROCEDURE — 6370000000 HC RX 637 (ALT 250 FOR IP): Performed by: PHYSICIAN ASSISTANT

## 2022-02-13 PROCEDURE — 6360000002 HC RX W HCPCS: Performed by: PHYSICIAN ASSISTANT

## 2022-02-13 PROCEDURE — 80048 BASIC METABOLIC PNL TOTAL CA: CPT

## 2022-02-13 PROCEDURE — 36415 COLL VENOUS BLD VENIPUNCTURE: CPT

## 2022-02-13 RX ADMIN — Medication 12 UNITS: at 21:04

## 2022-02-13 RX ADMIN — Medication 2 UNITS: at 18:22

## 2022-02-13 RX ADMIN — METFORMIN HYDROCHLORIDE 500 MG: 500 TABLET ORAL at 18:22

## 2022-02-13 RX ADMIN — ASPIRIN 325 MG ORAL TABLET 325 MG: 325 PILL ORAL at 08:25

## 2022-02-13 RX ADMIN — ALOGLIPTIN 25 MG: 12.5 TABLET, FILM COATED ORAL at 08:25

## 2022-02-13 RX ADMIN — OXYCODONE HYDROCHLORIDE AND ACETAMINOPHEN 500 MG: 500 TABLET ORAL at 08:25

## 2022-02-13 RX ADMIN — ZINC SULFATE 220 MG (50 MG) CAPSULE 50 MG: CAPSULE at 08:25

## 2022-02-13 RX ADMIN — METFORMIN HYDROCHLORIDE 500 MG: 500 TABLET ORAL at 08:25

## 2022-02-13 RX ADMIN — PANTOPRAZOLE SODIUM 40 MG: 40 TABLET, DELAYED RELEASE ORAL at 05:00

## 2022-02-13 RX ADMIN — CARVEDILOL 12.5 MG: 12.5 TABLET, FILM COATED ORAL at 18:22

## 2022-02-13 RX ADMIN — Medication 1 UNITS: at 21:03

## 2022-02-13 RX ADMIN — Medication 1 TABLET: at 08:25

## 2022-02-13 RX ADMIN — CARVEDILOL 12.5 MG: 12.5 TABLET, FILM COATED ORAL at 08:25

## 2022-02-13 RX ADMIN — Medication 5 MG: at 20:53

## 2022-02-13 RX ADMIN — ATORVASTATIN CALCIUM 80 MG: 80 TABLET, FILM COATED ORAL at 20:53

## 2022-02-13 RX ADMIN — AMLODIPINE BESYLATE 10 MG: 5 TABLET ORAL at 08:24

## 2022-02-13 RX ADMIN — ENOXAPARIN SODIUM 40 MG: 100 INJECTION SUBCUTANEOUS at 20:53

## 2022-02-13 RX ADMIN — LISINOPRIL 20 MG: 20 TABLET ORAL at 08:25

## 2022-02-13 ASSESSMENT — PAIN SCALES - GENERAL: PAINLEVEL_OUTOF10: 0

## 2022-02-13 NOTE — FLOWSHEET NOTE
02/12/22 2214   Assessment   Charting Type Shift assessment   Neurological   Neuro (WDL) X   Level of Consciousness Alert (0)   Orientation Level Oriented X4   Cognition Appropriate judgement   Language Clear; Appropriate for developmental age;Delayed responses;Nods/gestures appropriately;Paraphasic errors   Size R Pupil (mm) 3   R Pupil Shape Round   R Pupil Reaction Brisk   Size L Pupil (mm) 3   L Pupil Shape Round   L Pupil Reaction Brisk   R Hand  Strong   [place] Coma Scale   Eye Opening 4   Best Verbal Response 5   Best Motor Response 6   [place] Coma Scale Score 15   Cranial Nerves   Facial (CN VII) Facial paralysis - Right   Accessory (Cranial Nerve XI) Unable to shrug - right   Hypoglossal (CN XII) Deviation of tongue - Right   HEENT   HEENT (WDL) WDL   Respiratory   Respiratory (WDL) WDL   Respiratory Pattern Regular   Respiratory Depth Normal   Respiratory Quality/Effort Unlabored   Chest Assessment Chest expansion symmetrical   L Breath Sounds Clear   R Breath Sounds Clear   Breath Sounds   Right Upper Lobe Clear   Right Middle Lobe Clear   Right Lower Lobe Clear   Left Upper Lobe Clear   Left Lower Lobe Clear   Cardiac   Cardiac (WDL) WDL   Cardiac Monitor   Telemetry Monitor On No   Gastrointestinal   Abdominal (WDL) WDL   RUQ Bowel Sounds Active   LUQ Bowel Sounds Active   RLQ Bowel Sounds Active   LLQ Bowel Sounds Active   Abdomen Inspection Flat;Non-distended; No ascites; Soft   Tenderness Soft; No guarding;Nontender   Peripheral Vascular   Peripheral Vascular (WDL) X   Edema None   RLE Edema Trace   RUE Neurovascular Assessment   Capillary Refill Less than/equal to 3 seconds   Color Appropriate for ethnicity   Temperature Warm   R Radial Pulse +2   LUE Neurovascular Assessment   Capillary Refill Less than/equal to 3 seconds   Color Appropriate for ethnicity   Temperature Warm   L Radial Pulse +2   RLE Neurovascular Assessment   Capillary Refill Less than/equal to 3 seconds   Color Appropriate for ethnicity;Kristopher   Temperature Warm   R Post Tibial Pulse +2   R Pedal Pulse +2   R Calf Tenderness  Negative   LLE Neurovascular Assessment   Capillary Refill Less than/equal to 3 seconds   Color Appropriate for ethnicity   Temperature Warm   L Post Tibial Pulse +2   L Pedal Pulse +2   L Calf Tenderness Negative   Skin Color/Condition   Skin Color/Condition (WDL) WDL   Skin Integrity   Skin Integrity (WDL) WDL   Musculoskeletal   Musculoskeletal (WDL) X   RUE Flaccid   LUE Full movement   RL Extremity Swelling;Flaccid; Other (Comment)   LL Extremity Full movement   Genitourinary   Genitourinary (WDL) X   Flank Tenderness No   Suprapubic Tenderness No   Dysuria No   Urine Assessment   Urine Color Yellow/straw   Urine Appearance Hazy   Urine Odor No odor   Anus/Rectum   Anus/Rectum (WDL) WDL   Urethral Catheter Straight-tip 14 fr   Placement Date/Time: 02/04/22 2320   Urethral Catheter Timeout: Patient; Appropriate Equipment;Sterile technique  Inserted by: Alexey Guo RN  Catheter Type: Straight-tip  Tube Size (fr): 14 fr  Catheter Balloon Size: 10 mL  Collection Container: St...    Catheter Indications Urinary retention (acute or chronic), continuous bladder irrigation or bladder outlet obstruction   Site Assessment Moist;Pink   Urine Color Yellow   Urine Appearance Hazy   Psychosocial   Psychosocial (WDL) WDL

## 2022-02-14 VITALS
OXYGEN SATURATION: 98 % | HEART RATE: 62 BPM | RESPIRATION RATE: 18 BRPM | DIASTOLIC BLOOD PRESSURE: 69 MMHG | BODY MASS INDEX: 25.61 KG/M2 | SYSTOLIC BLOOD PRESSURE: 118 MMHG | WEIGHT: 150 LBS | TEMPERATURE: 98.6 F | HEIGHT: 64 IN

## 2022-02-14 LAB
GLUCOSE BLD-MCNC: 150 MG/DL (ref 74–106)
PERFORMED ON: ABNORMAL

## 2022-02-14 PROCEDURE — 92507 TX SP LANG VOICE COMM INDIV: CPT

## 2022-02-14 PROCEDURE — 99315 NF DSCHRG MGMT 30 MIN/LESS: CPT | Performed by: INTERNAL MEDICINE

## 2022-02-14 PROCEDURE — 6370000000 HC RX 637 (ALT 250 FOR IP): Performed by: PHYSICIAN ASSISTANT

## 2022-02-14 RX ORDER — ASCORBIC ACID 500 MG
500 TABLET ORAL DAILY
Qty: 30 TABLET | Refills: 0 | Status: SHIPPED | OUTPATIENT
Start: 2022-02-15

## 2022-02-14 RX ORDER — MECOBALAMIN 5000 MCG
5 TABLET,DISINTEGRATING ORAL NIGHTLY PRN
Qty: 30 TABLET | Refills: 0 | Status: SHIPPED | OUTPATIENT
Start: 2022-02-14

## 2022-02-14 RX ORDER — LISINOPRIL 20 MG/1
20 TABLET ORAL DAILY
Qty: 30 TABLET | Refills: 0 | Status: SHIPPED | OUTPATIENT
Start: 2022-02-14

## 2022-02-14 RX ORDER — ERGOCALCIFEROL 1.25 MG/1
50000 CAPSULE ORAL WEEKLY
Qty: 4 CAPSULE | Refills: 0 | Status: SHIPPED | OUTPATIENT
Start: 2022-02-21

## 2022-02-14 RX ORDER — CARVEDILOL 12.5 MG/1
12.5 TABLET ORAL 2 TIMES DAILY WITH MEALS
Qty: 60 TABLET | Refills: 0 | Status: SHIPPED | OUTPATIENT
Start: 2022-02-14

## 2022-02-14 RX ORDER — ZINC SULFATE 50(220)MG
50 CAPSULE ORAL DAILY
Qty: 30 CAPSULE | Refills: 0 | Status: SHIPPED | OUTPATIENT
Start: 2022-02-15

## 2022-02-14 RX ORDER — INSULIN GLARGINE 100 [IU]/ML
12 INJECTION, SOLUTION SUBCUTANEOUS NIGHTLY
Qty: 10 ML | Refills: 3 | Status: SHIPPED | OUTPATIENT
Start: 2022-02-14

## 2022-02-14 RX ORDER — ACETAMINOPHEN 325 MG/1
650 TABLET ORAL EVERY 6 HOURS PRN
Qty: 120 TABLET | Refills: 0 | Status: SHIPPED | OUTPATIENT
Start: 2022-02-14

## 2022-02-14 RX ORDER — AMLODIPINE BESYLATE 10 MG/1
10 TABLET ORAL DAILY
Qty: 30 TABLET | Refills: 0 | Status: SHIPPED | OUTPATIENT
Start: 2022-02-15

## 2022-02-14 RX ORDER — ASPIRIN 325 MG
325 TABLET ORAL DAILY
Qty: 30 TABLET | Refills: 0 | Status: SHIPPED | OUTPATIENT
Start: 2022-02-14

## 2022-02-14 RX ORDER — ALOGLIPTIN 25 MG/1
25 TABLET, FILM COATED ORAL DAILY
Qty: 30 TABLET | Refills: 0 | Status: SHIPPED | OUTPATIENT
Start: 2022-02-15

## 2022-02-14 RX ORDER — ATORVASTATIN CALCIUM 80 MG/1
80 TABLET, FILM COATED ORAL NIGHTLY
Qty: 30 TABLET | Refills: 0 | Status: SHIPPED | OUTPATIENT
Start: 2022-02-14

## 2022-02-14 RX ORDER — POLYETHYLENE GLYCOL 3350 17 G/17G
17 POWDER, FOR SOLUTION ORAL DAILY PRN
Qty: 527 G | Refills: 1 | Status: SHIPPED | OUTPATIENT
Start: 2022-02-14 | End: 2022-03-16

## 2022-02-14 RX ORDER — PANTOPRAZOLE SODIUM 40 MG/1
40 TABLET, DELAYED RELEASE ORAL
Qty: 30 TABLET | Refills: 0 | Status: SHIPPED | OUTPATIENT
Start: 2022-02-15

## 2022-02-14 RX ORDER — M-VIT,TX,IRON,MINS/CALC/FOLIC 27MG-0.4MG
1 TABLET ORAL DAILY
Qty: 30 TABLET | Refills: 0 | Status: SHIPPED | OUTPATIENT
Start: 2022-02-15

## 2022-02-14 RX ADMIN — OXYCODONE HYDROCHLORIDE AND ACETAMINOPHEN 500 MG: 500 TABLET ORAL at 08:19

## 2022-02-14 RX ADMIN — Medication 1 TABLET: at 08:18

## 2022-02-14 RX ADMIN — AMLODIPINE BESYLATE 10 MG: 5 TABLET ORAL at 08:19

## 2022-02-14 RX ADMIN — LISINOPRIL 20 MG: 20 TABLET ORAL at 08:19

## 2022-02-14 RX ADMIN — METFORMIN HYDROCHLORIDE 500 MG: 500 TABLET ORAL at 08:18

## 2022-02-14 RX ADMIN — Medication 1 UNITS: at 08:19

## 2022-02-14 RX ADMIN — ERGOCALCIFEROL 50000 UNITS: 1.25 CAPSULE ORAL at 08:18

## 2022-02-14 RX ADMIN — ALOGLIPTIN 25 MG: 12.5 TABLET, FILM COATED ORAL at 08:19

## 2022-02-14 RX ADMIN — ZINC SULFATE 220 MG (50 MG) CAPSULE 50 MG: CAPSULE at 08:18

## 2022-02-14 RX ADMIN — ASPIRIN 325 MG ORAL TABLET 325 MG: 325 PILL ORAL at 08:19

## 2022-02-14 RX ADMIN — CARVEDILOL 12.5 MG: 12.5 TABLET, FILM COATED ORAL at 08:19

## 2022-02-14 RX ADMIN — PANTOPRAZOLE SODIUM 40 MG: 40 TABLET, DELAYED RELEASE ORAL at 06:50

## 2022-02-14 ASSESSMENT — PAIN SCALES - GENERAL: PAINLEVEL_OUTOF10: 0

## 2022-02-14 NOTE — CARE COORDINATION
Pt has a bed today at [facility] for acute rehab. Family to transport.  will be here to take her there.   Pt and family are excited to transition to acute rehab

## 2022-02-14 NOTE — DISCHARGE SUMMARY
Discharge Summary      Patient ID: Sameer Castellano      Patient's PCP: SHEEBA Means    Admit Date: 1/28/2022     Discharge Date:  2/14/2022    Admitting Provider: Stacy Aguilar MD    Discharging Provider: ALEJANDRO Whalen     Reason for this admission:   Declining functional status following acute CVA    Discharge Diagnoses: Active Hospital Problems    Diagnosis Date Noted    UTI (urinary tract infection) [N39.0] 02/04/2022    Acute encephalopathy [G93.40] 02/04/2022    Acute CVA (cerebrovascular accident) ([facility] [address] [I63.9] 01/29/2022    Uncontrolled hypertension [I10] 01/29/2022    Type 2 diabetes mellitus ([facility] [E11.9] 01/29/2022    Hypokalemia [E87.6] 01/29/2022    COVID-19 [U07.1] 01/29/2022    Urinary retention [R33.9] 01/29/2022    Declining functional status [R53.81] 01/28/2022       Procedures:  CT HEAD WO CONTRAST   Final Result   Impression:       No acute intracranial abnormality. Evidence of prior infarction in the posterior limb of the left internal capsule with extension into the medial left temporal lobe. Recommend correlation with outside CT from 1/26/2022 (not available for review in the system). US DUP LOWER EXTREMITY RIGHT ARTERIES   Final Result   1. Preserved flow to the dorsalis pedis artery on the right. Monophasic waveform noted which may represent inflow disease. XR CHEST PORTABLE   Final Result      1. No acute disease. US DUP LOWER EXTREMITIES BILATERAL VENOUS   Final Result   1. Normal bilateral lower extremity duplex Doppler with no evidence of acute or chronic deep vein thrombosis            Consults:   IP CONSULT TO DIETITIAN  PT/OT  SLP    Briefly:   Ms. Hieu Castellano is a very pleasant 49-year-old female with past medical history of uncontrolled diabetes, uncontrolled hypertension, recent COVID-19 infection, recent acute CVA with residual deficits who was admitted to this facility for subacute rehabilitation.   Patient is making progress but it was felt she would benefit from more intensive rehabilitation course and therefore referred to [facility]. Fortunately she has been accepted at that facility and will be discharged there today. See details of swing bed course below. Hospital Course:       UTI (urinary tract infection) [N39.0]  -UA 2/3 c/w UTI; UCx grew E.coli  -Started Rocephin with improvement of MS on 2/3. Transitioned to [facility] on 2/6 to complete antibiotic course.     02/04/2022    Acute encephalopathy [G93.40]  -episode of acute encephalopathy on 2/3; CT head repeated 2/3 with no acute intracranial abnormality. Evidence of prior infarction in the posterior limb of the left internal capsule with extension into the medial left temporal lobe. Recommend correlation with outside CT from 1/26/22 (not available for review in the system). -per OSH records, initial CT head unremarkable  -UA c/w UTI; MS improved after initiation of Rocephin  -Brandon Dorman reviewed CT findings from 2/3 with [facility] Neuro Stroke [facility] Keyanna Swain). CVA expansion noted is likely reflective of completion of the recent CVA and not new CVA, since pt's MS have improved with tx of UTI. - mental status has been stable without recurrence- monitor closely     02/04/2022    Acute CVA (cerebrovascular accident) ([facility] [address] [I63.9]  -Dx with COVID on 1/24. Vaccinated + booster.  -presented to OSH on 1/26 with right sided weakness, dysarthria and profound HTN (). Initial NIHSS unclear.  -per OSH records, initial CT head 1/26 was unremarkable. MRI brain w/o contrast revealed an acute left basal ganglia CVA. CTA H/N neg for LVO or significant stenosis. Repeat CT head demonstrated a focal infarct of the left internal capsule. Teleneurology was consulted and pt was deemed outside the window for TPA.    -OSH echo w/bubble study: negative, with findings of LVH c/w uncontrolled HTN  -OSH SLP, PT OT with recs for IP skilled rehab and pt was transferred here for swing bed  -PT OT following. Pt continues to have right sided hemiplegia, right sided parital hemianopia, slight slurred speech and slight word finding difficulty. -pt would benefit from acute rehab; referral sent to [facility]   -diet: regular, thin liquids  -continue  mg daily and lipitor 80 mg QHS per Neurology  -avoid hypotension  -Discharged to [facility] today 2/14 01/29/2022    Hypertension [I10]  -with hypertensive emergency at OSH at time of CVA on 1/26 with 's, requiring Cardene gtt. Was not taking any anti-HTN meds prior to dx of CVA. -titrated BP regimen this admission. - Continue norvasc 10mg daily, coreg 12.5mg BID, lisinopril 20mg daily   -monitor and titrate as needed. Goal -150.    01/29/2022    Type 2 diabetes mellitus ([facility] [address] [E11.9]  -newly dx (at OSH)  -A1c 12.3  -BGs controlled with current regimen   01/29/2022    Hypokalemia [E87.6]  -replaced  -monitor and replete PRN    01/29/2022    COVID-19 [U07.1]  -dx on 1/24; mild sxs at time of dx  -fully vaccinated + booster  -taken out of precautions on 2/2 01/29/2022    Urinary retention [R33.9]  - Suspect neurogenic bladder related to recent stroke    -failed voiding trial on 2/4 and silva was re-anchored  -reattempted voiding trial 2/12 and silva had to be reanchored   01/29/2022    Declining functional status [R53.81]  -due to recent CVA with residual right sided hemiplegia, right sided partial hemianopia, slight slurred speech and slight word finding difficulty (improving)  - continue pt/ot  - Discharge to [facility] for acute rehab 2/14            Vital Signs  Temp: 98.4 °F (36.9 °C)  Pulse: 73  Resp: 18  BP: (!) 148/82  SpO2: 96 %  O2 Device: None (Room air)       Vital signs reviewed in electronic chart. Physical exam   Constitutional:  Well developed, well nourished, no acute distress. Lying in bed. Eyes:  conjunctiva normal, no scleral icterus. Right sided hemiopia.   HENT:  Atraumatic, external ears normal, external nose/nares normal, oropharynx moist, no pharyngeal exudates. Neck:  Supple. No JVD or thyromegaly. Respiratory:  No respiratory distress, normal breath sounds, no rales, no wheezing. Cardiovascular:  Normal rate, normal rhythm, no murmurs, no gallops, no rubs. GI:  Soft, nondistended, normal bowel sounds, nontender, no organomegaly, no mass. Musculoskeletal:  no tenderness, no obvious deformities. Full function of LUE and LLE. Right sided hemiplegia. Trace edema in right hand and RLE.    Integument:  Well hydrated, no rash. Neurologic:  Alert & oriented x 3. Right sided hemiplegia. Right sided partial hemianopia. Normal sensation RUE and RLE. Slight word finding difficulty. Speech slightly slurred at times. Psychiatric:  Speech slightly slurred at times. Behavior appropriate.         Disposition: [facility]    Discharged Condition: Stable    Activity: activity as tolerated with assistance  Diet: cardiac diet, diabetic diet and low fat, low cholesterol diet       Labs:  For convenience and continuity at follow-up the following most recent labs are provided:    CBC:   Lab Results   Component Value Date    WBC 8.2 02/13/2022    HGB 13.6 02/13/2022    HCT 40.0 02/13/2022     02/13/2022       RENAL:   Lab Results   Component Value Date     02/13/2022    K 4.1 02/13/2022    K 3.6 02/05/2022     02/13/2022    CO2 25 02/13/2022    BUN 18 02/13/2022    CREATININE <0.5 02/13/2022         Discharge Medications:      Current Discharge Medication List           Details   zinc sulfate (ZINCATE) 220 (50 Zn) MG capsule Take 1 capsule by mouth daily  Qty: 30 capsule, Refills: 0      vitamin D (ERGOCALCIFEROL) 1.25 MG (99331 UT) CAPS capsule Take 1 capsule by mouth once a week  Qty: 4 capsule, Refills: 0      polyethylene glycol (GLYCOLAX) 17 g packet Take 17 g by mouth daily as needed for Constipation  Qty: 527 g, Refills: 1      Multiple Vitamins-Minerals (THERAPEUTIC MULTIVITAMIN-MINERALS) tablet Take 1 tablet by mouth daily  Qty: 30 tablet, Refills: 0      pantoprazole (PROTONIX) 40 MG tablet Take 1 tablet by mouth every morning (before breakfast)  Qty: 30 tablet, Refills: 0      melatonin 5 MG TBDP disintegrating tablet Take 1 tablet by mouth nightly as needed (PRN for sleep)  Qty: 30 tablet, Refills: 0      insulin glargine (LANTUS) 100 UNIT/ML injection vial Inject 12 Units into the skin nightly  Qty: 10 mL, Refills: 3      hydrocortisone 2.5 % cream Apply topically 2 times daily Apply topically 2 times daily. Qty: 1 each, Refills: 0      acetaminophen (TYLENOL) 325 MG tablet Take 2 tablets by mouth every 6 hours as needed for Pain or Fever  Qty: 120 tablet, Refills: 0      alogliptin (NESINA) 25 MG TABS tablet Take 1 tablet by mouth daily  Qty: 30 tablet, Refills: 0      ascorbic acid (VITAMIN C) 500 MG tablet Take 1 tablet by mouth daily  Qty: 30 tablet, Refills: 0      carvedilol (COREG) 12.5 MG tablet Take 1 tablet by mouth 2 times daily (with meals)  Qty: 60 tablet, Refills: 0      !! insulin lispro (HUMALOG) 100 UNIT/ML injection vial Inject 0-6 Units into the skin 3 times daily (with meals) **Corrective Low Dose Algorithm** Glucose: Dose:              No Insulin 140-199 1 Unit 200-249 2 Units 250-299 3 Units 300-349 4 Units 350-399 5 Units Over 399 6 Units  Qty: 10 mL, Refills: 0      !! insulin lispro (HUMALOG) 100 UNIT/ML injection vial Inject 0-3 Units into the skin nightly **Corrective Bedtime (50%) Low Dose Algorithm** Glucose: Dose:              No Insulin 140-249 1 Unit 250-349 2 Units Over 350 3 Units  Qty: 10 mL, Refills: 0       !! - Potential duplicate medications found. Please discuss with provider.            Details   metFORMIN (GLUCOPHAGE) 500 MG tablet Take 1 tablet by mouth 2 times daily (with meals)  Qty: 60 tablet, Refills: 0      lisinopril (PRINIVIL;ZESTRIL) 20 MG tablet Take 1 tablet by mouth daily  Qty: 30 tablet, Refills: 0 amLODIPine (NORVASC) 10 MG tablet Take 1 tablet by mouth daily  Qty: 30 tablet, Refills: 0      aspirin 325 MG tablet Take 1 tablet by mouth daily  Qty: 30 tablet, Refills: 0      atorvastatin (LIPITOR) 80 MG tablet Take 1 tablet by mouth at bedtime  Qty: 30 tablet, Refills: 0            Patient was seen and examined with Dr. Mahnaz Tan. After reviewing patient data and diagnostic testing the plan of care was established in conjunction with Dr. Mahnaz Tan. Signed:  Electronically signed by ALEJANDRO Tavarez on 2/14/2022 at 10:16 AM       Thank you SHEEBA Mon for the opportunity to be involved in this patient's care. If you have any questions or concerns please feel free to contact me at [phone].

## 2022-02-14 NOTE — PROGRESS NOTES
Patient d/c'd to go to [facility] att. IV removed without incident. Discharge instructions given to and gone over with patient and , stated understanding and no questions att. Discharge packet given to patient  to give to medical staff at [facility], patient belongings packed up and taken out to car by patient . Rasmussen catheter left in place r/t urinary retention per orders from [facility]. Vitals stable att. Patient is stable at time of d/c, wheeled out to personal vehicle via wheelchair.

## 2022-02-14 NOTE — PROGRESS NOTES
[facility]  SPEECH/LANGUAGE PATHOLOGY   INPATIENT DAILY NOTE      [x] Daily           [x] Discharge    Patient:Noreen Palmer Deacon      :1965  RSI:[id number]  Rehab Dx/Hx: Pneumonia due to COVID-19 virus [U07.1, J12.82]  Declining functional status [R53.81]   No Known Allergies  Precautions: Restrictions/Precautions: General Precautions,Isolation,Fall Risk,Contact Precautions     Home Situation/IADL:   Social/Functional History  Lives With: Spouse  Type of Home: House  Home Layout: Two level,Able to Live on Main level with bedroom/bathroom  Home Access: Stairs to enter without rails  Entrance Stairs - Number of Steps: 2  Bathroom Shower/Tub: Tub/Shower unit,Walk-in shower  Bathroom Toilet: Standard  Bathroom Equipment: Grab bars in shower  ADL Assistance: [address] [place] [address] Avenue: Independent  Homemaking Responsibilities: Yes  Ambulation Assistance: Independent  Transfer Assistance: Independent  Active : Yes  Date of Admit: 2022  Room #: G104/G104-01      Number of Minutes/Billable Intervention  Cog/Memory Deficits     Aphasia/Language     Dysarthria/Speech 28   Apraxia/Speech     Dysphagia/Swallowing     Group     Other    TOTAL Minutes Billed 28    Variance          Date: 2022  Day of ARU Week:  4       SLP Individual Minutes  Time In: 10:20  Time Out: 10:48  Minutes: 28 [place] in:    [place] out-   [place] total-      Variance/Reason:  [] Refusal due to   [] Medical hold/reason  [] Illness   [] Off Unit for test/procedure  [] Extra time needed to complete task  [] Other (specify)    Activity completed: Patient seated upright in bed, pleasant and agreeable to ST tx. Patient being discharged to [facility] for continued rehab services, Patient has made significant progress with goals, Patient has met oral motor goal with increased speech intelligibility, and communication of wants and needs.  Patient continues to demonstrated with word finding deficits however has made significant progress with expressive language with personal and general information with 90% accuracy. Patient continues to demonstrate with inconsistencies with concrete/abstract naming task. Patient would continue to benefit for acute rehab services to return to [facility]. Pain: None reported or indicated  Current Diet: ADULT DIET; Regular; 4 carb choices (60 gm/meal); Low Fat/Low Chol/High Fiber/JARROD; Low Sodium (2 gm)  ADULT ORAL NUTRITION SUPPLEMENT; Breakfast, Lunch, Dinner; Diabetic Oral Supplement     Subjective: Patient upright in bed , Patient expressing she is living this am to go to [employer] and POC: Co-treats where appropriate with PT or OT to facilitate patient goals in functional tasks. , Patient would benefit from continued acute rehab services to return to [facility]    LTG Goal 1: Patient will demonstrate with increased speech intelligibility for increased communcation with wants/needs 100% accuracy  _Goal met   Timeframe for Long-term Goals: 4 weeks   Short-term Goals  Timeframe for Short-term Goals: 2 weeks  Goal 1: Patient will peform oral motor exercises for increased speech intelligibiltity 20/20 x-Goal met  Goal 2: Patient will demonstrate with increased speech intelligibility for increased communication with wants/needs 90% accuracy-Goal Met  Goal 3: Patient will complete word finding tasks with 80% accuracy over consecutive sessions to improve overall expressive communication skills. -Goal not met due to patient discharging to [address] [place] [address] placed: [x]bed []chair   []other:          Barriers to progress:   [] Fatigue        [] Cognitive Deficits   [] Memory Deficits   [] Reduced Attn   [] Self Limiting Behaviors    [] Reduced insight/awareness     [x] Visual Deficits   [] Premorbid Conditions   [] Impulsivity     [] Other    Education/Interventions used this date: Aspiration precaution guidelines/safe swallow strategies   [x] Progress was updated and reviewed with patient and/or family this date.       Interventions used this date:  [x] Speech/Language Treatment    [] Instruction in HEP    Group   [] Dysphagia Treatment   [] Cognitive Skill Imtiaz    Other:         Assessment / Impression                                                          Treatment/Activity Tolerance:   [x] Tolerated Treatment well:     [] Patient limited by fatigue/pain:      [] Patient limited by medical complications:    [] Adverse Reaction to Tx:   [] Significant change in status:                      Joe Quintanilla Copper

## 2022-03-05 ENCOUNTER — HOSPITAL ENCOUNTER (EMERGENCY)
Facility: HOSPITAL | Age: 57
Discharge: HOME | End: 2022-03-05
Attending: FAMILY MEDICINE
Payer: COMMERCIAL

## 2022-03-05 VITALS
HEART RATE: 64 BPM | DIASTOLIC BLOOD PRESSURE: 66 MMHG | OXYGEN SATURATION: 100 % | RESPIRATION RATE: 18 BRPM | HEIGHT: 60 IN | BODY MASS INDEX: 24.54 KG/M2 | SYSTOLIC BLOOD PRESSURE: 134 MMHG | WEIGHT: 125 LBS | TEMPERATURE: 97.8 F

## 2022-03-05 DIAGNOSIS — L03.115 CELLULITIS OF FOOT, RIGHT: ICD-10-CM

## 2022-03-05 DIAGNOSIS — N30.00 ACUTE CYSTITIS WITHOUT HEMATURIA: Primary | ICD-10-CM

## 2022-03-05 LAB
BACTERIA: ABNORMAL /HPF
BILIRUBIN URINE: NEGATIVE
BLOOD, URINE: ABNORMAL
CLARITY: ABNORMAL
COLOR: YELLOW
GLUCOSE URINE: NEGATIVE MG/DL
KETONES, URINE: ABNORMAL MG/DL
LEUKOCYTE ESTERASE, URINE: ABNORMAL
MICROSCOPIC EXAMINATION: YES
NITRITE, URINE: POSITIVE
PH UA: 5.5 (ref 5–8)
PROTEIN UA: 100 MG/DL
RBC UA: ABNORMAL /HPF (ref 0–4)
SPECIFIC GRAVITY UA: >=1.03 (ref 1–1.03)
URINE REFLEX TO CULTURE: YES
URINE TYPE: ABNORMAL
UROBILINOGEN, URINE: 1 E.U./DL
WBC UA: >100 /HPF (ref 0–5)

## 2022-03-05 PROCEDURE — 81001 URINALYSIS AUTO W/SCOPE: CPT

## 2022-03-05 PROCEDURE — 87077 CULTURE AEROBIC IDENTIFY: CPT

## 2022-03-05 PROCEDURE — 99282 EMERGENCY DEPT VISIT SF MDM: CPT

## 2022-03-05 PROCEDURE — 2500000003 HC RX 250 WO HCPCS: Performed by: FAMILY MEDICINE

## 2022-03-05 PROCEDURE — 96372 THER/PROPH/DIAG INJ SC/IM: CPT

## 2022-03-05 PROCEDURE — 87086 URINE CULTURE/COLONY COUNT: CPT

## 2022-03-05 PROCEDURE — 6360000002 HC RX W HCPCS: Performed by: FAMILY MEDICINE

## 2022-03-05 PROCEDURE — 87186 SC STD MICRODIL/AGAR DIL: CPT

## 2022-03-05 RX ORDER — INSULIN DETEMIR 100 [IU]/ML
15 INJECTION, SOLUTION SUBCUTANEOUS NIGHTLY
COMMUNITY

## 2022-03-05 RX ORDER — CEFDINIR 300 MG/1
300 CAPSULE ORAL 2 TIMES DAILY
Qty: 14 CAPSULE | Refills: 0 | Status: SHIPPED | OUTPATIENT
Start: 2022-03-05 | End: 2022-03-12

## 2022-03-05 RX ORDER — BETHANECHOL CHLORIDE 10 MG/1
10 TABLET ORAL 2 TIMES DAILY
COMMUNITY

## 2022-03-05 RX ADMIN — LIDOCAINE HYDROCHLORIDE 1000 MG: 10 INJECTION, SOLUTION INFILTRATION; PERINEURAL at 07:36

## 2022-03-05 NOTE — ED NOTES
Discharge instructions reviewed with pt and family understanding verbalized. No further needs or concerns at this time. Pt assisted to wheel chair and taken to pov.       Gino Dias RN  03/05/22 [id number]

## 2022-03-05 NOTE — ED PROVIDER NOTES
[address] [place] [address] Court  eMERGENCY dEPARTMENT eNCOUnter      Pt Name: Stalin Mathew  MRN: [id number]  [place] 1965  Date of evaluation: 3/5/2022  Provider: Brenda Tipton DO    CHIEF COMPLAINT       Chief Complaint   Patient presents with    Other         HISTORY OF PRESENT ILLNESS   (Location/Symptom, Timing/Onset, Context/Setting, Quality, Duration, Modifying Factors, Severity)  Note limiting factors. Noreen Mathew is a 60 y.o. female who presents to the emergency department for having pain to her suprapubic area and having some burning with the urinary catheter. Pt has had the catheter for the past 1 month. History of urinary infection in the past Feb 2022. Pt had E. Coli with culture. Pt recently released by [facility] since having a stroke with right hemiparesis. Pt said that when she sat up and moved, her urine catheter drained and the symptoms resolved. Pt without fever. No abdominal pain. No hematuria. Pt also noticed that she had some redness to right foot that they wanted looked at. Nursing Notes were reviewed. REVIEW OF SYSTEMS    (2-9 systems for level 4, 10 or more forlevel 5)     Review of Systems   Gastrointestinal:        Suprapubic pain; History of silva cath   Genitourinary: Positive for dysuria. All other systems reviewed and are negative. PAST MEDICAL HISTORY     Past Medical History:   Diagnosis Date    Diabetes mellitus type 2 with complications ([address]     History of cerebrovascular accident (CVA) with residual deficit     HTN (hypertension)          SURGICAL HISTORY     History reviewed. No pertinent surgical history.       CURRENT MEDICATIONS       Previous Medications    ACETAMINOPHEN (TYLENOL) 325 MG TABLET    Take 2 tablets by mouth every 6 hours as needed for Pain or Fever    ALOGLIPTIN (NESINA) 25 MG TABS TABLET    Take 1 tablet by mouth daily    AMLODIPINE (NORVASC) 10 MG TABLET    Take 1 tablet by mouth daily    ASCORBIC ACID (VITAMIN C) 500 MG TABLET    Take 1 tablet by mouth daily    ASPIRIN 325 MG TABLET    Take 1 tablet by mouth daily    ATORVASTATIN (LIPITOR) 80 MG TABLET    Take 1 tablet by mouth at bedtime    BETHANECHOL (URECHOLINE) 10 MG TABLET    Take 10 mg by mouth 3 times daily    CARVEDILOL (COREG) 12.5 MG TABLET    Take 1 tablet by mouth 2 times daily (with meals)    HYDROCORTISONE 2.5 % CREAM    Apply topically 2 times daily Apply topically 2 times daily.     INSULIN DETEMIR (LEVEMIR) 100 UNIT/ML INJECTION VIAL    Inject 15 Units into the skin nightly    INSULIN GLARGINE (LANTUS) 100 UNIT/ML INJECTION VIAL    Inject 12 Units into the skin nightly    INSULIN LISPRO (HUMALOG) 100 UNIT/ML INJECTION VIAL    Inject 0-6 Units into the skin 3 times daily (with meals) **Corrective Low Dose Algorithm** Glucose: Dose:              No Insulin 140-199 1 Unit 200-249 2 Units 250-299 3 Units 300-349 4 Units 350-399 5 Units Over 399 6 Units    INSULIN LISPRO (HUMALOG) 100 UNIT/ML INJECTION VIAL    Inject 0-3 Units into the skin nightly **Corrective Bedtime (50%) Low Dose Algorithm** Glucose: Dose:              No Insulin 140-249 1 Unit 250-349 2 Units Over 350 3 Units    LISINOPRIL (PRINIVIL;ZESTRIL) 20 MG TABLET    Take 1 tablet by mouth daily    MELATONIN 5 MG TBDP DISINTEGRATING TABLET    Take 1 tablet by mouth nightly as needed (PRN for sleep)    METFORMIN (GLUCOPHAGE) 500 MG TABLET    Take 1 tablet by mouth 2 times daily (with meals)    MULTIPLE VITAMINS-MINERALS (THERAPEUTIC MULTIVITAMIN-MINERALS) TABLET    Take 1 tablet by mouth daily    PANTOPRAZOLE (PROTONIX) 40 MG TABLET    Take 1 tablet by mouth every morning (before breakfast)    POLYETHYLENE GLYCOL (GLYCOLAX) 17 G PACKET    Take 17 g by mouth daily as needed for Constipation    VITAMIN D (ERGOCALCIFEROL) 1.25 MG (08340 UT) CAPS CAPSULE    Take 1 capsule by mouth once a week    ZINC SULFATE (ZINCATE) 220 (50 ZN) MG CAPSULE    Take 1 capsule by mouth daily ALLERGIES     Patient has no known allergies. FAMILY HISTORY     History reviewed. No pertinent family history. SOCIAL HISTORY       Social History     Socioeconomic History    Marital status:      Spouse name: None    Number of children: None    Years of education: None    Highest education level: None   Occupational History    None   Tobacco Use    Smoking status: Never Smoker    Smokeless tobacco: Never Used   Substance and Sexual Activity    Alcohol use: None    Drug use: None    Sexual activity: None   Other Topics Concern    None   Social History Narrative    None     Social Determinants of Health     Financial Resource Strain:     Difficulty of Paying Living Expenses: Not on file   Food Insecurity:     Worried About Running Out of Food in the Last Year: Not on file    Adam of Food in the Last Year: Not on file   Transportation Needs:     Lack of Transportation (Medical): Not on file    Lack of Transportation (Non-Medical):  Not on file   Physical Activity:     Days of Exercise per Week: Not on file    Minutes of Exercise per Session: Not on file   Stress:     Feeling of Stress : Not on file   Social Connections:     Frequency of Communication with Friends and Family: Not on file    Frequency of Social Gatherings with Friends and Family: Not on file    Attends [employer] Services: Not on file    Active Member of [address] [place] [address] or Organizations: Not on file    Attends Club or Organization Meetings: Not on file    Marital Status: Not on file   Intimate Partner Violence:     Fear of Current or Ex-Partner: Not on file    Emotionally Abused: Not on file    Physically Abused: Not on file    Sexually Abused: Not on file   Housing Stability:     Unable to Pay for Housing in the Last Year: Not on file    Number of [place] in the Last Year: Not on file    Unstable Housing in the Last Year: Not on file       SCREENINGS    Karmen Coma Scale  Eye Opening: Spontaneous  Best Verbal Response: Oriented  Best Motor Response: Obeys commands  Karmen Coma Scale Score: 15        PHYSICAL EXAM    (up to 7 for level 4, 8 or more for level 5)     ED Triage Vitals [03/05/22 0606]   BP Temp Temp Source Pulse Resp SpO2 Height Weight   (!) 149/72 97.8 °F (36.6 °C) Skin 63 20 100 % 5' (1.524 m) 125 lb (56.7 kg)       Physical Exam  Vitals and nursing note reviewed. Constitutional:       General: She is not in acute distress. Appearance: Normal appearance. HENT:      Head: Normocephalic. Eyes:      Extraocular Movements: Extraocular movements intact. Conjunctiva/sclera: Conjunctivae normal.      Pupils: Pupils are equal, round, and reactive to light. Cardiovascular:      Rate and Rhythm: Normal rate and regular rhythm. Heart sounds: Normal heart sounds. Pulmonary:      Effort: Pulmonary effort is normal.      Breath sounds: Normal breath sounds. Abdominal:      Palpations: Abdomen is soft. Tenderness: There is no abdominal tenderness. Skin:     General: Skin is warm and dry. Comments: Pt with mild diffuse erythema to right dorsum of foot with small wound/scab to the distal 2nd toe just under the nail   Neurological:      Mental Status: She is alert and oriented to person, place, and time. Comments: Slight difficulty with speech.  Pt with right sided hemiparesis   Psychiatric:         Mood and Affect: Mood normal.         Behavior: Behavior normal.         DIAGNOSTIC RESULTS     EKG: All EKG's are interpreted by the Emergency Department Physician who either signs or Co-signs this chart in the absence of a cardiologist.    None    RADIOLOGY:   Non-plain film images such as CT, Ultrasound and MRI are read by the radiologist. Plain radiographic images are visualized andpreliminarily interpreted by the emergency physician with the below findings:    None    Interpretationper the Radiologist below, if available at the time of this note:    No orders to display         ED BEDSIDE ULTRASOUND:   Performed by ED Physician - none    LABS:  Labs Reviewed   URINALYSIS WITH REFLEX TO CULTURE - Abnormal; Notable for the following components:       Result Value    Clarity, UA TURBID (*)     Ketones, Urine TRACE (*)     Blood, Urine LARGE (*)     Protein,  (*)     Nitrite, Urine POSITIVE (*)     Leukocyte Esterase, Urine LARGE (*)     All other components within normal limits   MICROSCOPIC URINALYSIS - Abnormal; Notable for the following components:    WBC, UA >100 (*)     RBC, UA 11-20 (*)     Bacteria, UA 3+ (*)     All other components within normal limits   CULTURE, URINE       All other labs were within normal range or not returned as of this dictation. EMERGENCY DEPARTMENT COURSE and DIFFERENTIAL DIAGNOSIS/MDM:   Vitals:    Vitals:    03/05/22 0606   BP: (!) 149/72   Pulse: 63   Resp: 20   Temp: 97.8 °F (36.6 °C)   TempSrc: Skin   SpO2: 100%   Weight: 125 lb (56.7 kg)   Height: 5' (1.524 m)           CRITICAL CARE TIME   Total Critical Care time was 0 minutes, excluding separatelyreportable procedures. There was a high probability ofclinically significant/life threatening deterioration in the patient's condition which required my urgent intervention. CONSULTS:  None    PROCEDURES:  None    PROGRESS NOTES:    Pt with silva placed for a month. New one in for 3 days. Pt with pain to suprapubic area. Recent 2/2022 with E. Coli in urine. Sensitive to floxins and cephalosporins. Pt with small wound to foot and some cellulitis and will cover her urine along with skin with one antibiotic. Pt willing to get Rocephin injection. Went over medications with patient/. FINAL IMPRESSION      1. Acute cystitis without hematuria New Problem   2.  Cellulitis of foot, right New Problem         DISPOSITION/PLAN   DISPOSITION Decision To Discharge 03/05/2022 07:23:42 AM      PATIENT REFERRED TO:    Follow up with PCP early next week if symptoms of foot or abdominal pain, trouble with catheter occur. Antibiotics given.           DISCHARGE MEDICATIONS:  New Prescriptions    CEFDINIR (OMNICEF) 300 MG CAPSULE    Take 1 capsule by mouth 2 times daily for 7 days       (Please note that portions of this note were completed with a voice recognition program.  Efforts were made to edit the dictations but occasionallywords are mis-transcribed.)    Kingsley Shah DO (electronically signed)  Attending Emergency Physician          Kingsley Shah DO  03/05/22 0053

## 2022-03-05 NOTE — ED TRIAGE NOTES
Pt released f [facility] today. Was having                     pain and pressure at cath site. Pt concerned about uti.  pts rt foot also red and mildly swollen

## 2022-03-06 PROBLEM — N39.0 UTI (URINARY TRACT INFECTION): Status: RESOLVED | Noted: 2022-02-04 | Resolved: 2022-03-06

## 2022-03-07 LAB
ORGANISM: ABNORMAL
URINE CULTURE, ROUTINE: ABNORMAL

## 2022-03-14 ENCOUNTER — DOCUMENTATION (OUTPATIENT)
Dept: NEUROLOGY | Facility: CLINIC | Age: 57
End: 2022-03-14

## 2022-03-14 ENCOUNTER — OFFICE VISIT (OUTPATIENT)
Dept: NEUROLOGY | Facility: CLINIC | Age: 57
End: 2022-03-14

## 2022-03-14 VITALS — SYSTOLIC BLOOD PRESSURE: 118 MMHG | DIASTOLIC BLOOD PRESSURE: 68 MMHG

## 2022-03-14 DIAGNOSIS — E11.69 TYPE 2 DIABETES MELLITUS WITH OTHER SPECIFIED COMPLICATION, WITH LONG-TERM CURRENT USE OF INSULIN: ICD-10-CM

## 2022-03-14 DIAGNOSIS — E78.2 MIXED HYPERLIPIDEMIA: ICD-10-CM

## 2022-03-14 DIAGNOSIS — I10 PRIMARY HYPERTENSION: ICD-10-CM

## 2022-03-14 DIAGNOSIS — F33.0 MILD RECURRENT MAJOR DEPRESSION: ICD-10-CM

## 2022-03-14 DIAGNOSIS — Z79.4 TYPE 2 DIABETES MELLITUS WITH OTHER SPECIFIED COMPLICATION, WITH LONG-TERM CURRENT USE OF INSULIN: ICD-10-CM

## 2022-03-14 DIAGNOSIS — Z86.73 HISTORY OF STROKE: Primary | ICD-10-CM

## 2022-03-14 PROCEDURE — 99214 OFFICE O/P EST MOD 30 MIN: CPT

## 2022-03-14 RX ORDER — ALOGLIPTIN 25 MG/1
TABLET, FILM COATED ORAL
COMMUNITY

## 2022-03-14 RX ORDER — ZINC SULFATE 50(220)MG
220 CAPSULE ORAL DAILY
COMMUNITY
End: 2022-12-16 | Stop reason: ALTCHOICE

## 2022-03-14 RX ORDER — PANTOPRAZOLE SODIUM 40 MG/1
40 TABLET, DELAYED RELEASE ORAL DAILY
COMMUNITY
Start: 2022-02-15 | End: 2022-12-16 | Stop reason: ALTCHOICE

## 2022-03-14 RX ORDER — ASPIRIN 81 MG/1
81 TABLET, CHEWABLE ORAL DAILY
COMMUNITY

## 2022-03-14 RX ORDER — BETHANECHOL CHLORIDE 10 MG/1
10 TABLET ORAL DAILY
COMMUNITY

## 2022-03-14 RX ORDER — INSULIN GLARGINE 100 [IU]/ML
12 INJECTION, SOLUTION SUBCUTANEOUS
COMMUNITY
Start: 2022-02-14 | End: 2022-12-16 | Stop reason: ALTCHOICE

## 2022-03-14 RX ORDER — ERGOCALCIFEROL 1.25 MG/1
1 CAPSULE ORAL WEEKLY
COMMUNITY
Start: 2022-02-21 | End: 2022-12-16 | Stop reason: ALTCHOICE

## 2022-03-14 RX ORDER — CARVEDILOL 12.5 MG/1
12.5 TABLET ORAL 2 TIMES DAILY WITH MEALS
COMMUNITY
Start: 2022-02-14

## 2022-03-14 RX ORDER — SITAGLIPTIN 100 MG/1
1 TABLET, FILM COATED ORAL DAILY
COMMUNITY
Start: 2022-03-02 | End: 2022-12-16 | Stop reason: ALTCHOICE

## 2022-03-14 NOTE — PROGRESS NOTES
New Patient Office Visit      Encounter Date: 2022   Patient Name: Jacqueline Rob  : 1965   MRN: [id number]   PCP: Provider, No Known    Referring Provider: Dr. Bashar Mohsen     Chief Complaint:  ***      History of Present Illness: Jacqueline Rob is a 56 y.o. female with known medical diagnoses of hypertension, hyperlipidemia, uncontrolled diabetes mellitus type 2 who presents to the stroke clinic today for follow-up.  The patient was admitted to [facility] on 2022 after experiencing right-sided weakness and speech difficulties 2 days prior.  With her symptoms did not improve she went to the emergency department for further evaluation.  While there she underwent a complete stroke work-up which included an MRI of the brain without contrast which revealed a left posterior limb of internal capsule stroke.  She was discharged home on  mg and atorvastatin 80 mg.    Since being discharged the patient states ***    Stroke Risk Factors: {Stroke risk factors:02037}      Subjective      Review of Systems:   Review of Systems    Past Medical History: No past medical history on file.    Past Surgical History: No past surgical history on file.    Family History: No family history on file.    Social History:   Social History     Socioeconomic History   • Marital status:    Tobacco Use   • Smoking status: Never Smoker   Substance and Sexual Activity   • Alcohol use: Never   • Drug use: Never   • Sexual activity: Defer       Medications:     Current Outpatient Medications:   •  amLODIPine (NORVASC) 5 MG tablet, Take 1 tablet by mouth Daily., Disp: , Rfl:   •  aspirin 325 MG tablet, Take 1 tablet by mouth Daily., Disp: , Rfl:   •  atorvastatin (LIPITOR) 80 MG tablet, Take 1 tablet by mouth Every Night., Disp: , Rfl:   •  insulin aspart (novoLOG) 100 UNIT/ML injection, Inject 0-9 Units under the skin into the appropriate area as directed 3 (Three) Times a Day Before Meals., Disp: ,  Rfl: 12  •  insulin detemir (LEVEMIR) 100 UNIT/ML injection, Inject 10 Units under the skin into the appropriate area as directed Daily., Disp: , Rfl: 12  •  lisinopril (PRINIVIL,ZESTRIL) 20 MG tablet, Take 1 tablet by mouth Daily., Disp: , Rfl:   •  metFORMIN (GLUCOPHAGE) 500 MG tablet, Take 1 tablet by mouth 2 (Two) Times a Day With Meals., Disp: , Rfl:     Allergies:   No Known Allergies    Objective     Physical Exam:  Vital Signs: There were no vitals filed for this visit.  There is no height or weight on file to calculate BMI.     Physical Exam  Neurological Exam     NIH Stroke Scale    1a  Level of consciousness: {consciousness neuro:82290}   1b. LOC questions:  {LOC QUESTIONS NEURO:69662}    1c. LOC commands: {loc commands neuro:00831}   2.  Best Gaze: {best gaze neuro:44137}   3. Visual: {visual neuro:97879}   4. Facial Palsy: {facial palsy:51083}   5a. Motor left arm: {motor neuro:24469}   5b.  Motor right arm: {motor neuro:71137}   6a. Motor left leg: {motor neuro:77538}   6b  Motor right leg:  {motor neuro:63030}   7. Limb Ataxia: {limb ataxia neuro:78415}   8.  Sensory: {sensory neuro:10039}   9. Best Language:  {best language neuro:99215}   10. Dysarthria: {dysarthria neuro:52030}   11. Extinction and Inattention: {extinction neuro:62155}    Total:   {0-42:70974}         Modified Airam Score: ***        0  No Symptoms    1 No significant disability. Able to carry out all usual activities, despite some symptoms.    2 Slight disability. Able to look after own affairs without assistance, but unable to carry out all previous activities.    3 Moderate disability. Requires some help, but able to walk unassisted.    4 Moderately severe disability. Unable to attend to own bodily needs without assistance, and unable to walk unassisted.    5 Severe disability. Requires constant nursing care and attention, bedridden, incontinent.    6 Dead        PHQ-9 Depression Screening  Little interest or pleasure in doing  things?     Feeling down, depressed, or hopeless?     Trouble falling or staying asleep, or sleeping too much?     Feeling tired or having little energy?     Poor appetite or overeating?     Feeling bad about yourself - or that you are a failure or have let yourself or your family down?     Trouble concentrating on things, such as reading the newspaper or watching television?     Moving or speaking so slowly that other people could have noticed? Or the opposite - being so fidgety or restless that you have been moving around a lot more than usual?     Thoughts that you would be better off dead, or of hurting yourself in some way?     PHQ-9 Total Score     If you checked off any problems, how difficult have these problems made it for you to do your work, take care of things at home, or get along with other people?        Imaging Reviewed:     CTA head/neck is negative for atherosclerotic disease, flow-limiting stenosis, or large vessel occlusive stroke.    MRI of the brain without contrast reveals a acute stroke within the left posterior limb of internal capsule, no evidence of hemorrhage.    Results for orders placed during the hospital encounter of 01/26/22    Adult Transthoracic Echo Complete W/ Cont if Necessary Per Protocol (With Agitated Saline)    Interpretation Summary  1. Normal left ventricular size and systolic function, LVEF 65-70%.  2. Mild to moderate concentric LVH.  3. Normal LV diastolic filling pattern.  4. Normal right ventricular size and systolic function.  5. Normal left and right atrial size.  6. No significant valvular abnormalities.  7. Negative bubble study with no evidence of intracardiac or intrapulmonary shunt.        Laboratory Results:     H/H 18.9/52.3  Platelets 202  Hemoglobin A1c 12.30    AST 19  ALT 32    Assessment / Plan      Assessment/Plan:   There are no diagnoses linked to this encounter.       Discussed the importance of medication compliance and lifestyle  modifications (adequate blood pressure control, adequate control of hyperlipidemia, adequate glycemic control, increase physical activity, and healthy diet) to help reduce the risk of future cerebrovascular events.  Also discussed the signs symptoms that would warrant the patient return back to the emergency department including unilateral weakness, unilateral numbness, visual disturbances, loss of balance, speech difficulties, and/or a sudden severe headache.  ***      Follow Up:   No follow-ups on file.      Time spent ***    LEANNE Beckman  [facility]

## 2022-03-14 NOTE — PROGRESS NOTES
New Patient Office Visit      Encounter Date: 2022   Patient Name: Jacqueline Rob  : 1965   MRN: [id number]   PCP: Lois Syed APRN    Referring Provider: Dr. Bashar Mohsen  Chief Complaint:    Chief Complaint   Patient presents with   • Stroke       History of Present Illness: Jacqueline Rob is a 56 y.o. female with known medical diagnoses of hypertension, hyperlipidemia, uncontrolled diabetes mellitus type 2, recent COVID-19 infection (2022), and depression who presents to the stroke clinic today for follow-up.  The patient was admitted to [facility] on 2022 after experiencing right-sided weakness and dysarthria which she experienced 2 days prior.  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  She was discharged on  mg and atorvastatin 80 mg daily and completed inpatient rehabilitation at [facility] for approximately 3 weeks.  She continues to receive home health PT/OT and has made some improvements in movement of her right side.    The patient tells me that prior to her stroke she did not regularly see a physician therefore her diagnosis of diabetes, hypertension, and hyperlipidemia are all new.  She reports compliance with her medications and has made changes to her diet in an effort to lower her blood sugar and cholesterol.  She was recently placed on Zoloft for depression following her stroke and she reports that this has tremendously improved her mood.  Her spouse, who accompanies her to her appointment today is extremely involved in the patient's stroke recovery; support and rehabilitation.  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    The patient does have difficulty with urination since her stroke and is scheduled to see a  urologist sometime next week.  She currently has a Downing catheter in place and failed bladder retraining at [facility].    Stroke Risk Factors: diabetes mellitus, hyperlipidemia and hypertension      Subjective      Review of Systems:   Review of Systems   Constitutional: Positive for activity change and fatigue.   HENT: Negative for nosebleeds and trouble swallowing.    Eyes: Positive for visual disturbance.   Respiratory: Negative for cough, choking, chest tightness and shortness of breath.    Cardiovascular: Negative for chest pain and palpitations.   Gastrointestinal: Positive for diarrhea. Negative for blood in stool, constipation, nausea and vomiting.   Endocrine: Negative.    Genitourinary: Positive for difficulty urinating. Negative for dysuria and hematuria.   Musculoskeletal: Positive for gait problem.   Skin: Negative.    Neurological: Positive for dizziness, speech difficulty and weakness. Negative for seizures, numbness, headache and memory problem.   Hematological: Negative.    Psychiatric/Behavioral: Negative.        Past Medical History:   Past Medical History:   Diagnosis Date   • Diabetes mellitus (HCC)    • Hyperlipidemia    • Hypertension    • Stroke (HCC)        Past Surgical History: No past surgical history on file.    Family History: No family history on file.    Social History:   Social History     Socioeconomic History   • Marital status:    Tobacco Use   • Smoking status: Never Smoker   Substance and Sexual Activity   • Alcohol use: Never   • Drug use: Never   • Sexual activity: Defer       Medications:     Current Outpatient Medications:   •  Alogliptin Benzoate (Nesina) 25 MG tablet, Take  by mouth., Disp: , Rfl:   •  amLODIPine (NORVASC) 5 MG tablet, Take 1 tablet by mouth Daily., Disp: , Rfl:   •  aspirin 81 MG chewable tablet, Chew 81 mg Daily., Disp: , Rfl:   •  atorvastatin (LIPITOR) 80 MG tablet, Take 1 tablet by mouth Every Night., Disp: , Rfl:    •  bethanechol (URECHOLINE) 10 MG tablet, Take 10 mg by mouth 3 (Three) Times a Day., Disp: , Rfl:   •  carvedilol (COREG) 12.5 MG tablet, Take 12.5 mg by mouth., Disp: , Rfl:   •  ergocalciferol (ERGOCALCIFEROL) 1.25 MG (92055 UT) capsule, Take 1 capsule by mouth 1 (One) Time Per Week., Disp: , Rfl:   •  insulin aspart (novoLOG) 100 UNIT/ML injection, Inject 0-9 Units under the skin into the appropriate area as directed 3 (Three) Times a Day Before Meals., Disp: , Rfl: 12  •  insulin detemir (LEVEMIR) 100 UNIT/ML injection, Inject 10 Units under the skin into the appropriate area as directed Daily., Disp: , Rfl: 12  •  insulin glargine (LANTUS, SEMGLEE) 100 UNIT/ML injection, Inject 12 Units under the skin into the appropriate area as directed., Disp: , Rfl:   •  Januvia 100 MG tablet, Take 1 tablet by mouth Daily., Disp: , Rfl:   •  lisinopril (PRINIVIL,ZESTRIL) 20 MG tablet, Take 1 tablet by mouth Daily., Disp: , Rfl:   •  metFORMIN (GLUCOPHAGE) 500 MG tablet, Take 1 tablet by mouth 2 (Two) Times a Day With Meals., Disp: , Rfl:   •  pantoprazole (PROTONIX) 40 MG EC tablet, Take 40 mg by mouth Daily., Disp: , Rfl:   •  zinc sulfate (ZINCATE) 220 (50 Zn) MG capsule, Take 220 mg by mouth Daily., Disp: , Rfl:   •  aspirin 325 MG tablet, Take 1 tablet by mouth Daily., Disp: , Rfl:     Allergies:   No Known Allergies    Objective     Physical Exam:  Vital Signs:   Vitals:    03/14/22 1133   BP: 118/68     There is no height or weight on file to calculate BMI.     Physical Exam  Vitals reviewed.   Constitutional:       Appearance: Normal appearance.   HENT:      Head: Normocephalic.      Mouth/Throat:      Mouth: Mucous membranes are moist.   Eyes:      Extraocular Movements: Extraocular movements intact.      Pupils: Pupils are equal, round, and reactive to light.      Comments: Complete right homonymous hemianopia   Cardiovascular:      Rate and Rhythm: Normal rate and regular rhythm.      Heart sounds: Normal heart  sounds.   Pulmonary:      Effort: Pulmonary effort is normal. No respiratory distress.      Breath sounds: Normal breath sounds.   Abdominal:      General: Bowel sounds are normal.   Genitourinary:     Comments: Urinary catheter in place  Musculoskeletal:      Cervical back: Normal range of motion and neck supple.      Right lower leg: No edema.      Left lower leg: No edema.   Skin:     General: Skin is dry.      Comments: Left lower extremity cool   Neurological:      Mental Status: She is alert and oriented to person, place, and time.      Cranial Nerves: No cranial nerve deficit.      Sensory: No sensory deficit.      Motor: Weakness present.      Coordination: Coordination is intact. Coordination normal.   Psychiatric:         Mood and Affect: Mood normal.         Speech: Speech normal.         Behavior: Behavior normal.       Neurological Exam  Mental Status  Alert. Oriented to person, place, time and situation. Oriented to person, place, and time. Speech is normal. Language is fluent with no aphasia.    Cranial Nerves  CN II: Vision test: Complete right homonymous hemianopia. Right homonymous hemianopsia.  CN III, IV, VI: Extraocular movements intact bilaterally. Pupils equal round and reactive to light bilaterally.  CN V: Facial sensation is normal.  CN VII: Full and symmetric facial movement.  CN VIII: Hearing intact bilaterally.  CN IX, X: Palate elevates symmetrically  CN XI:  Right: Sternocleidomastoid strength is weak. Trapezius strength is weak.  Left: Sternocleidomastoid strength is normal. Trapezius strength is normal.  CN XII: Tongue deviates to the right.    Motor  Decreased muscle bulk throughout. No fasciculations present. Increased muscle tone.  Left upper and lower extremity with full range of motion, 4+/5 strength  Right upper extremity with proximal strength of 2/5, distal strength of 1/5  Right lower extremity with proximal strength of 2/5, distal 0/5.    Sensory  Light touch is normal in  upper and lower extremities.     Coordination    Finger-to-nose, rapid alternating movements and heel-to-shin normal bilaterally without dysmetria.    Gait    Not assessed.       NIH Stroke Scale    NIH Stroke Scale    1a  Level of consciousness: 0=alert; keenly responsive   1b. LOC questions:  0=Answers both questions correctly    1c. LOC commands: 0=Performs both tasks correctly   2.  Best Gaze: 0=normal   3. Visual: 2=Complete hemianopia   4. Facial Palsy: 0=Normal symmetric movement   5a. Motor left arm: 0=No drift, limb holds 90 (or 45) degrees for full 10 seconds   5b.  Motor right arm: 3=No effort against gravity, limb falls   6a. Motor left le=No drift, limb holds 90 (or 45) degrees for full 10 seconds   6b  Motor right leg:  3=No effort against gravity, limb falls   7. Limb Ataxia: 0=Absent   8.  Sensory: 0=Normal; no sensory loss   9. Best Language:  0=No aphasia, normal   10. Dysarthria: 0=Normal   11. Extinction and Inattention: 0=No abnormality    Total:   8         Modified Airam Score: 4; patient reports she still requires assistance with ADLs including dressing, bathing, and toileting.  She is unable to walk without assistance.        0  No Symptoms    1 No significant disability. Able to carry out all usual activities, despite some symptoms.    2 Slight disability. Able to look after own affairs without assistance, but unable to carry out all previous activities.    3 Moderate disability. Requires some help, but able to walk unassisted.    4 Moderately severe disability. Unable to attend to own bodily needs without assistance, and unable to walk unassisted.    5 Severe disability. Requires constant nursing care and attention, bedridden, incontinent.    6 Dead        PHQ-9 Depression Screening  PHQ-9 Total Score: 1    Imaging Reviewed:     CTA of the head/neck is negative for atherosclerotic disease, flow-limiting stenosis, or large vessel occlusive stroke.    MRI of the brain without contrast  reveals an acute stroke within the left posterior limb of internal capsule, no evidence of hemorrhage.    Results for orders placed during the hospital encounter of 01/26/22    Adult Transthoracic Echo Complete W/ Cont if Necessary Per Protocol (With Agitated Saline)    Interpretation Summary  1. Normal left ventricular size and systolic function, LVEF 65-70%.  2. Mild to moderate concentric LVH.  3. Normal LV diastolic filling pattern.  4. Normal right ventricular size and systolic function.  5. Normal left and right atrial size.  6. No significant valvular abnormalities.  7. Negative bubble study with no evidence of intracardiac or intrapulmonary shunt.    Laboratory Results:     H/H 18.9/52.3  Platelets 202  Hemoglobin A1c 12.30    Total cholesterol 282  Triglycerides 313  AST 19  ALT 32      Assessment / Plan      Assessment/Plan:   Diagnoses and all orders for this visit:    1. History of stroke, left posterior limb of internal capsule (Primary)  Etiology small vessel disease poorly controlled risk factors versus hypercoagulability from COVID-19 infection  -Continue  mg daily  -Continue high intensity statin, atorvastatin 80 mg nightly  -We will request recent labs from primary care physician (A1c and lipid panel)  -Continue home health PT/OT    2. Primary hypertension  -Patient's systolic blood pressures during hospitalization were 140-230, she reports blood pressures have been well controlled since hospital discharge  -Continue antihypertensive medications per primary care recommendations, goal blood pressure <130/80  -Encourage patient to take blood pressure twice daily and log readings    3. Mixed hyperlipidemia  -Continue atorvastatin 80 mg nightly  -We will need to recheck lipid panel at the end of April if PCP did not recently perform this; requested records  -Can consider fenofibrate if patient's triglycerides are still elevated  -Encourage patient to engage in a cardiac/diabetic healthy  diet    4. Type 2 diabetes mellitus with other specified complication, with long-term current use of insulin (HCC)  -Continue diabetes medications per PCP  -Goal blood glucose <140  -Encourage patient to make dietary changes to assist with lowering her A1c  -Will need A1c at the end of April if PCP did not recently perform this; requested records     5.  Depression  -Patient currently on Zoloft, has been encouraged to contact her PCP if she feels the dose needs to be adjusted    Discussed the importance of medication compliance and lifestyle modifications (adequate blood pressure control, adequate control of hyperlipidemia, adequate glycemic control, increase physical activity, and healthy diet) to help reduce the risk of future cerebrovascular events.  Also discussed the signs symptoms that would warrant the patient return back to the emergency department including unilateral weakness, unilateral numbness, visual disturbances, loss of balance, speech difficulties, and/or a sudden severe headache.  Patient and her spouse both verbalized their understanding.  They have been encouraged to contact our office should they have any questions or concerns prior to their next follow-up appointment.    Follow Up:   Return in about 3 months (around 6/14/2022) for Recheck.    Time spent 30    LEANNE Beckman  [facility] Stroke

## 2022-03-17 ENCOUNTER — PATIENT ROUNDING (BHMG ONLY) (OUTPATIENT)
Dept: UROLOGY | Facility: CLINIC | Age: 57
End: 2022-03-17

## 2022-03-17 ENCOUNTER — OFFICE VISIT (OUTPATIENT)
Dept: UROLOGY | Facility: CLINIC | Age: 57
End: 2022-03-17

## 2022-03-17 VITALS
TEMPERATURE: 97.2 F | DIASTOLIC BLOOD PRESSURE: 84 MMHG | SYSTOLIC BLOOD PRESSURE: 122 MMHG | WEIGHT: 147 LBS | HEIGHT: 64 IN | HEART RATE: 71 BPM | OXYGEN SATURATION: 96 % | BODY MASS INDEX: 25.1 KG/M2

## 2022-03-17 DIAGNOSIS — R33.9 URINARY RETENTION: Primary | ICD-10-CM

## 2022-03-17 PROCEDURE — 99203 OFFICE O/P NEW LOW 30 MIN: CPT | Performed by: PHYSICIAN ASSISTANT

## 2022-03-17 RX ORDER — FLURBIPROFEN SODIUM 0.3 MG/ML
SOLUTION/ DROPS OPHTHALMIC
COMMUNITY
Start: 2022-03-02 | End: 2022-12-16 | Stop reason: ALTCHOICE

## 2022-03-17 RX ORDER — ZINC SULFATE 50(220)MG
1 CAPSULE ORAL DAILY
COMMUNITY
Start: 2022-02-15 | End: 2022-12-16 | Stop reason: ALTCHOICE

## 2022-03-17 RX ORDER — LISINOPRIL 10 MG/1
TABLET ORAL
COMMUNITY
Start: 2022-03-02 | End: 2022-12-16 | Stop reason: ALTCHOICE

## 2022-03-17 RX ORDER — LANCETS 33 GAUGE
EACH MISCELLANEOUS
COMMUNITY
Start: 2022-03-08 | End: 2022-12-16 | Stop reason: ALTCHOICE

## 2022-03-17 RX ORDER — MULTIPLE VITAMINS W/ MINERALS TAB 9MG-400MCG
1 TAB ORAL DAILY
COMMUNITY
Start: 2022-02-15 | End: 2022-12-16 | Stop reason: ALTCHOICE

## 2022-03-17 RX ORDER — ESCITALOPRAM OXALATE 10 MG/1
20 TABLET ORAL
COMMUNITY
Start: 2022-03-04

## 2022-03-17 RX ORDER — ALOGLIPTIN 25 MG/1
1 TABLET, FILM COATED ORAL DAILY
COMMUNITY
Start: 2022-02-15 | End: 2022-06-14 | Stop reason: SDUPTHER

## 2022-03-17 RX ORDER — BLOOD SUGAR DIAGNOSTIC
STRIP MISCELLANEOUS
COMMUNITY
Start: 2022-03-08 | End: 2022-12-16 | Stop reason: ALTCHOICE

## 2022-03-17 NOTE — PROGRESS NOTES
Chief Complaint  No chief complaint on file.       Referring Provider  Lois Syed APRN HPI  Ms. Rob is a 56 y.o. female presents with complaint of urinary retention.     Catheter initially placed in early February at [facility] and [place] shortly after a CVA. Has had 3 UTIs since then and has been unable to wean off of catheter. Last catheter exchange was 2 weeks ago. Just finished antibiotics 6 days ago. Is having urgency and bladder pain. Prior to this string of hospitalizations in January following her stroke, she had never needed a catheter. Denies numbness in the groin. Has some residual right sided weakness, all improving with PT. She is currently taking TID bethanecol.    Past Medical History  Past Medical History:   Diagnosis Date   • Diabetes mellitus (HCC)    • Hyperlipidemia    • Hypertension    • Stroke (HCC)        Past Surgical History  History reviewed. No pertinent surgical history.    Medications  Current Outpatient Medications   Medication Sig Dispense Refill   • Alogliptin Benzoate 25 MG tablet Take  by mouth.     • Alogliptin Benzoate 25 MG tablet Take 1 tablet by mouth Daily.     • aspirin 81 MG chewable tablet Chew 81 mg Daily.     • atorvastatin (LIPITOR) 80 MG tablet Take 1 tablet by mouth Every Night.     • bethanechol (URECHOLINE) 10 MG tablet Take 10 mg by mouth 3 (Three) Times a Day.     • carvedilol (COREG) 12.5 MG tablet Take 12.5 mg by mouth.     • ergocalciferol (ERGOCALCIFEROL) 1.25 MG (63118 UT) capsule Take 1 capsule by mouth 1 (One) Time Per Week.     • hydrocortisone 2.5 % cream Apply  topically to the appropriate area as directed.     • insulin detemir (LEVEMIR) 100 UNIT/ML injection Inject 10 Units under the skin into the appropriate area as directed Daily.  12   • Januvia 100 MG tablet Take 1 tablet by mouth Daily.     • lisinopril (PRINIVIL,ZESTRIL) 20 MG tablet Take 1 tablet by mouth Daily.     • metFORMIN (GLUCOPHAGE) 500 MG tablet Take 1 tablet by mouth 2 (Two)  "Times a Day With Meals.     • multivitamin with minerals (therapeutic multivitamin-minerals) tablet tablet Take 1 tablet by mouth Daily.     • pantoprazole (PROTONIX) 40 MG EC tablet Take 40 mg by mouth Daily.     • zinc sulfate (ZINCATE) 220 (50 Zn) MG capsule Take 220 mg by mouth Daily.     • zinc sulfate (ZINCATE) 220 (50 Zn) MG capsule Take 1 capsule by mouth Daily.     • amLODIPine (NORVASC) 5 MG tablet Take 1 tablet by mouth Daily.     • aspirin 325 MG tablet Take 1 tablet by mouth Daily.     • B-D UF III MINI PEN NEEDLES 31G X 5 MM misc      • escitalopram (LEXAPRO) 10 MG tablet      • insulin aspart (novoLOG) 100 UNIT/ML injection Inject 0-9 Units under the skin into the appropriate area as directed 3 (Three) Times a Day Before Meals.  12   • insulin glargine (LANTUS, SEMGLEE) 100 UNIT/ML injection Inject 12 Units under the skin into the appropriate area as directed.     • Lancets (OneTouch Delica Plus [id number]) misc TEST BLOOD SUGAR TWICE A DAY AND AS NEEDED     • lisinopril (PRINIVIL,ZESTRIL) 10 MG tablet      • OneTouch Ultra test strip TEST BLOOD SUGAR TWICE A DAY AND AS NEEDED       No current facility-administered medications for this visit.       Allergies  No Known Allergies    Social History  Social History     Socioeconomic History   • Marital status:    Tobacco Use   • Smoking status: Never Smoker   Substance and Sexual Activity   • Alcohol use: Never   • Drug use: Never   • Sexual activity: Defer         Physical Exam  Visit Vitals  /84 (BP Location: Right arm, Patient Position: Sitting, Cuff Size: Adult)   Pulse 71   Temp 97.2 °F (36.2 °C)   Ht 162.6 cm (64\")   Wt 66.7 kg (147 lb)   SpO2 96%   BMI 25.23 kg/m²         Labs  Brief Urine Lab Results  (Last result in the past 365 days)      Color   Clarity   Blood   Leuk Est   Nitrite   Protein   CREAT   Urine HCG        03/05/22 0620 Yellow   TURBID   LARGE   LARGE   POSITIVE                   Lab Results   Component Value Date    " GLUCOSE 300 (H) 01/28/2022    CALCIUM 9.4 01/28/2022     (L) 01/28/2022    K 3.3 (L) 01/28/2022    CO2 20.4 (L) 01/28/2022     01/28/2022    BUN 18 01/28/2022    CREATININE 0.57 01/28/2022    EGFRIFNONA 109 01/28/2022    BCR 31.6 (H) 01/28/2022    ANIONGAP 11.6 01/28/2022       Lab Results   Component Value Date    WBC 8.2 02/13/2022    HGB 13.6 02/13/2022    HCT 40.0 02/13/2022    MCV 94.3 02/13/2022     02/13/2022       PVR  Post-void residual performed by staff - 0cc    I have personally reviewed her labs and post void residual imaging.     Assessment  Ms. Rob is a 56 y.o. female with history of hypertension, CVA, presenting with urinary retention and Downing catheter.  The Downing catheter was placed during a string of hospital stays complicated by UTIs following her stroke.  She had the catheter removed while she was at inpatient rehab, but not a formal fill and void trial.  She ended up with a catheter placed again because she could not get to the bedside commode with nursing enough time to go to the bathroom.  She is very eager to have the catheter removed today and is having a bladder urgency as we speak.  Fill and void is performed and she passes without difficulty, 0 cc retained.  She and her  are still instructed on CIC and provided multiple different samples.     Plan  1.  Urinary retention   -Passed fill and void   -CIC as needed   -Continue bethanechol 10 mg 3 times daily    Follow-up in 1 month for PVR, stopping bethanechol

## 2022-03-18 NOTE — PROGRESS NOTES
March 18, 2022    Helviviane, may I speak with Jacqueline Rob? Spoke with pt.    My name is Odalys QUINTANILLA    I am  with [facility]  [address]  [address]  [place] [facility] [address] [phone].    Before we get started may I verify your date of birth? 1965 correct.    I am calling to officially welcome you to our practice and ask about your recent visit. Is this a good time to talk? Yes.    Tell me about your visit with us. What things went well? My visit was good, the PA seemed very knowledgeble  and took her time talking with me and I felt better about my situation when I left the office.       We're always looking for ways to make our patients' experiences even better. Do you have recommendations on ways we may improve?  no.    Overall were you satisfied with your first visit to our practice? Yes, very.       I appreciate you taking the time to speak with me today. Is there anything else I can do for you? No.      Thank you, and have a great day.

## 2022-04-03 ENCOUNTER — HOSPITAL ENCOUNTER (EMERGENCY)
Facility: HOSPITAL | Age: 57
Discharge: HOME | End: 2022-04-03
Attending: EMERGENCY MEDICINE
Payer: COMMERCIAL

## 2022-04-03 VITALS
HEIGHT: 65 IN | WEIGHT: 125 LBS | DIASTOLIC BLOOD PRESSURE: 80 MMHG | SYSTOLIC BLOOD PRESSURE: 165 MMHG | BODY MASS INDEX: 20.83 KG/M2 | TEMPERATURE: 98.2 F | OXYGEN SATURATION: 99 % | RESPIRATION RATE: 20 BRPM | HEART RATE: 81 BPM

## 2022-04-03 DIAGNOSIS — I10 HYPERTENSION, UNSPECIFIED TYPE: Primary | ICD-10-CM

## 2022-04-03 LAB
A/G RATIO: 1.5 (ref 0.8–2)
ALBUMIN SERPL-MCNC: 3.8 G/DL (ref 3.4–4.8)
ALP BLD-CCNC: 77 U/L (ref 25–100)
ALT SERPL-CCNC: 21 U/L (ref 4–36)
ANION GAP SERPL CALCULATED.3IONS-SCNC: 11 MMOL/L (ref 3–16)
AST SERPL-CCNC: 23 U/L (ref 8–33)
BASOPHILS ABSOLUTE: 0 K/UL (ref 0–0.1)
BASOPHILS RELATIVE PERCENT: 0.6 %
BILIRUB SERPL-MCNC: 0.6 MG/DL (ref 0.3–1.2)
BUN BLDV-MCNC: 11 MG/DL (ref 6–20)
CALCIUM SERPL-MCNC: 9.9 MG/DL (ref 8.5–10.5)
CHLORIDE BLD-SCNC: 102 MMOL/L (ref 98–107)
CO2: 25 MMOL/L (ref 20–30)
CREAT SERPL-MCNC: <0.5 MG/DL (ref 0.4–1.2)
EOSINOPHILS ABSOLUTE: 0.1 K/UL (ref 0–0.4)
EOSINOPHILS RELATIVE PERCENT: 2 %
GFR AFRICAN AMERICAN: >59
GFR NON-AFRICAN AMERICAN: >60
GLOBULIN: 2.6 G/DL
GLUCOSE BLD-MCNC: 119 MG/DL (ref 74–106)
HCT VFR BLD CALC: 40.4 % (ref 37–47)
HEMOGLOBIN: 13.6 G/DL (ref 11.5–16.5)
IMMATURE GRANULOCYTES #: 0 K/UL
IMMATURE GRANULOCYTES %: 0.4 % (ref 0–5)
LIPASE: 41 U/L (ref 5.6–51.3)
LYMPHOCYTES ABSOLUTE: 1.3 K/UL (ref 1.5–4)
LYMPHOCYTES RELATIVE PERCENT: 24.2 %
MCH RBC QN AUTO: 32.2 PG (ref 27–32)
MCHC RBC AUTO-ENTMCNC: 33.7 G/DL (ref 31–35)
MCV RBC AUTO: 95.7 FL (ref 80–100)
MONOCYTES ABSOLUTE: 0.4 K/UL (ref 0.2–0.8)
MONOCYTES RELATIVE PERCENT: 6.6 %
NEUTROPHILS ABSOLUTE: 3.6 K/UL (ref 2–7.5)
NEUTROPHILS RELATIVE PERCENT: 66.2 %
PDW BLD-RTO: 13.8 % (ref 11–16)
PLATELET # BLD: 191 K/UL (ref 150–400)
PMV BLD AUTO: 9.8 FL (ref 6–10)
POTASSIUM SERPL-SCNC: 4.1 MMOL/L (ref 3.4–5.1)
PRO-BNP: 92 PG/ML (ref 0–1800)
RBC # BLD: 4.22 M/UL (ref 3.8–5.8)
SODIUM BLD-SCNC: 138 MMOL/L (ref 136–145)
TOTAL PROTEIN: 6.4 G/DL (ref 6.4–8.3)
TROPONIN: <0.3 NG/ML
WBC # BLD: 5.4 K/UL (ref 4–11)

## 2022-04-03 PROCEDURE — 36415 COLL VENOUS BLD VENIPUNCTURE: CPT

## 2022-04-03 PROCEDURE — 93005 ELECTROCARDIOGRAM TRACING: CPT

## 2022-04-03 PROCEDURE — 99282 EMERGENCY DEPT VISIT SF MDM: CPT

## 2022-04-03 PROCEDURE — 84484 ASSAY OF TROPONIN QUANT: CPT

## 2022-04-03 PROCEDURE — 6360000002 HC RX W HCPCS: Performed by: EMERGENCY MEDICINE

## 2022-04-03 PROCEDURE — 80053 COMPREHEN METABOLIC PANEL: CPT

## 2022-04-03 PROCEDURE — 83690 ASSAY OF LIPASE: CPT

## 2022-04-03 PROCEDURE — 85025 COMPLETE CBC W/AUTO DIFF WBC: CPT

## 2022-04-03 PROCEDURE — 83880 ASSAY OF NATRIURETIC PEPTIDE: CPT

## 2022-04-03 PROCEDURE — 96374 THER/PROPH/DIAG INJ IV PUSH: CPT

## 2022-04-03 RX ORDER — HYDRALAZINE HYDROCHLORIDE 20 MG/ML
10 INJECTION INTRAMUSCULAR; INTRAVENOUS ONCE
Status: COMPLETED | OUTPATIENT
Start: 2022-04-03 | End: 2022-04-03

## 2022-04-03 RX ADMIN — HYDRALAZINE HYDROCHLORIDE 10 MG: 20 INJECTION INTRAMUSCULAR; INTRAVENOUS at 22:16

## 2022-04-04 NOTE — ED PROVIDER NOTES
[address] [facility] [address]  eMERGENCY dEPARTMENT eNCOUnter      Pt Name: Rj Sauer  MRN: [id number]  YOB: 1965  Date ofevaluation: 6/6/3138  Provider: Syl La MD    CHIEF COMPLAINT       Chief Complaint   Patient presents with    Hypertension     Onset tonight. Took extra 20mg of lisinopril. HISTORY OF PRESENT ILLNESS  (Location/Symptom, Timing/Onset, Context/Setting, Quality, Duration, Modifying Factors, Severity.)   Noreen Sauer is a 60 y.o. female who presents to the emergency department with elevated BP tonight. She admits that she didn't have any symptoms but was worried she might have a stroke. 195 on one machine and 215 on a different machine. He didn't know the bottom number of the BP's. Her son is a pharmacist.  He gave her an additional Lisinopril 20mg dose. Nursing notes were reviewed. REVIEW OF SYSTEMS    (2-9systems for level 4, 10 or more for level 5)   ROS:  General:  No fevers, no chills, no weakness  HEENT: No sore throat, runny nose or ear pain  Cardiovascular:  No chest pain, no palpitations  Respiratory:  No shortness of breath, no cough, no wheezing  Gastrointestinal:  No pain, no nausea, no vomiting, no diarrhea  Musculoskeletal:  No muscle pain, no joint pain  Skin:  No rash, no easy bruising  Genitourinary:  No dysuria, no hematuria    Except as noted above theremainder of the review of systems was reviewed and negative. PASTMEDICAL HISTORY     Past Medical History:   Diagnosis Date    Diabetes mellitus type 2 with complications ([facility] [address]     History of cerebrovascular accident (CVA) with residual deficit     HTN (hypertension)          SURGICAL HISTORY     No past surgical history on file.       CURRENT MEDICATIONS       Discharge Medication List as of 4/3/2022 10:53 PM      CONTINUE these medications which have NOT CHANGED    Details   bethanechol (URECHOLINE) 10 MG tablet Take 10 mg by mouth 2 times daily Historical Med      insulin detemir (LEVEMIR) 100 UNIT/ML injection vial Inject 15 Units into the skin nightlyHistorical Med      zinc sulfate (ZINCATE) 220 (50 Zn) MG capsule Take 1 capsule by mouth daily, Disp-30 capsule, R-0Print      vitamin D (ERGOCALCIFEROL) 1.25 MG (64365 UT) CAPS capsule Take 1 capsule by mouth once a week, Disp-4 capsule, R-0Print      Multiple Vitamins-Minerals (THERAPEUTIC MULTIVITAMIN-MINERALS) tablet Take 1 tablet by mouth daily, Disp-30 tablet, R-0Print      pantoprazole (PROTONIX) 40 MG tablet Take 1 tablet by mouth every morning (before breakfast), Disp-30 tablet, R-0Print      melatonin 5 MG TBDP disintegrating tablet Take 1 tablet by mouth nightly as needed (PRN for sleep), Disp-30 tablet, R-0Print      metFORMIN (GLUCOPHAGE) 500 MG tablet Take 1 tablet by mouth 2 times daily (with meals), Disp-60 tablet, R-0Print      lisinopril (PRINIVIL;ZESTRIL) 20 MG tablet Take 1 tablet by mouth daily, Disp-30 tablet, R-0Print      insulin glargine (LANTUS) 100 UNIT/ML injection vial Inject 12 Units into the skin nightly, Disp-10 mL, R-3Print      hydrocortisone 2.5 % cream Apply topically 2 times daily Apply topically 2 times daily. , Topical, 2 TIMES DAILY Starting Mon 2/14/2022, Disp-1 each, R-0, Print      acetaminophen (TYLENOL) 325 MG tablet Take 2 tablets by mouth every 6 hours as needed for Pain or Fever, Disp-120 tablet, R-0Print      alogliptin (NESINA) 25 MG TABS tablet Take 1 tablet by mouth daily, Disp-30 tablet, R-0Print      amLODIPine (NORVASC) 10 MG tablet Take 1 tablet by mouth daily, Disp-30 tablet, R-0Print      ascorbic acid (VITAMIN C) 500 MG tablet Take 1 tablet by mouth daily, Disp-30 tablet, R-0Print      aspirin 325 MG tablet Take 1 tablet by mouth daily, Disp-30 tablet, R-0Print      atorvastatin (LIPITOR) 80 MG tablet Take 1 tablet by mouth at bedtime, Disp-30 tablet, R-0Print      carvedilol (COREG) 12.5 MG tablet Take 1 tablet by mouth 2 times daily (with meals), Disp-60 tablet, R-0Print      !! insulin lispro (HUMALOG) 100 UNIT/ML injection vial Inject 0-6 Units into the skin 3 times daily (with meals) **Corrective Low Dose Algorithm** Glucose: Dose:              No Insulin 140-199 1 Unit 200-249 2 Units 250-299 3 Units 300-349 4 Units 350-399 5 Units Over 399 6 Units, Disp-10 mL, R-0Prin t      !! insulin lispro (HUMALOG) 100 UNIT/ML injection vial Inject 0-3 Units into the skin nightly **Corrective Bedtime (50%) Low Dose Algorithm** Glucose: Dose:              No Insulin 140-249 1 Unit 250-349 2 Units Over 350 3 Units, Disp-10 mL, R-0Print       !! - Potential duplicate medications found. Please discuss with provider. ALLERGIES     Patient has no known allergies. FAMILY HISTORY     No family history on file. SOCIAL HISTORY       Social History     Socioeconomic History    Marital status:      Spouse name: Not on file    Number of children: Not on file    Years of education: Not on file    Highest education level: Not on file   Occupational History    Not on file   Tobacco Use    Smoking status: Never Smoker    Smokeless tobacco: Never Used   Substance and Sexual Activity    Alcohol use: Not on file    Drug use: Not on file    Sexual activity: Not on file   Other Topics Concern    Not on file   Social History Narrative    Not on file     Social Determinants of Health     Financial Resource Strain:     Difficulty of Paying Living Expenses: Not on file   Food Insecurity:     Worried About Running Out of Food in the Last Year: Not on file    Adam of Food in the Last Year: Not on file   Transportation Needs:     Lack of Transportation (Medical): Not on file    Lack of Transportation (Non-Medical):  Not on file   Physical Activity:     Days of Exercise per Week: Not on file    Minutes of Exercise per Session: Not on file   Stress:     Feeling of Stress : Not on file   Social Connections:     Frequency of Communication with Friends and Family: Not on file    Frequency of Social Gatherings with Friends and Family: Not on file    Attends [employer] Services: Not on file    Active Member of Clubs or Organizations: Not on file    Attends Club or Organization Meetings: Not on file    Marital Status: Not on file   Intimate Partner Violence:     Fear of Current or Ex-Partner: Not on file    Emotionally Abused: Not on file    Physically Abused: Not on file    Sexually Abused: Not on file   Housing Stability:     Unable to Pay for Housing in the Last Year: Not on file    Number of [place] in the Last Year: Not on file    Unstable Housing in the Last Year: Not on file         PHYSICAL EXAM    (up to 7 forlevel 4, 8 or more for level 5)     ED Triage Vitals   BP Temp Temp src Pulse Resp SpO2 Height Weight   -- -- -- -- -- -- -- --       Physical Exam  General :Patient is awake, alert, oriented, in no acute distress, nontoxic appearing  HEENT: Pupils are equally round and reactive to light, EOMI. Cardiac: Heart regular rate, rhythm, no murmurs, rubs, or gallops  Lungs: Lungs are clear to auscultation, there is no wheezing, rhonchi, or rales. Abdomen: Abdomen is soft, nontender, nondistended. Musculoskeletal: Ambulatory  Back: No midline or bony tenderness. Dermatology: Skin is warm and dry  Psych: Mentation is grossly normal, cognition is grossly normal. Affect is appropriate.       DIAGNOSTIC RESULTS     EKG:  NSR  Rate of 70  No acute changes    RADIOLOGY:   Non-plain film images such as CT, Ultrasoundand MRI are read by the radiologist. Plain radiographic images are visualized and preliminarily interpreted by the emergency physician with the below findings:      [] Radiologist's Report Reviewed:  No orders to display         ED BEDSIDE ULTRASOUND:   Performed by ED Physician - none    LABS:  Labs Reviewed   CBC WITH AUTO DIFFERENTIAL - Abnormal; Notable for the following components:       Result Value    MCH 32.2 (*) Lymphocytes Absolute 1.3 (*)     All other components within normal limits   COMPREHENSIVE METABOLIC PANEL - Abnormal; Notable for the following components:    Glucose 119 (*)     All other components within normal limits   BRAIN NATRIURETIC PEPTIDE   LIPASE   TROPONIN       I have reviewed and interpreted all of the currently available lab resultsfrom this visit (if applicable):  Results for orders placed or performed during the hospital encounter of 04/03/22   Brain Natriuretic Peptide   Result Value Ref Range    Pro-BNP 92 0 - 1,800 pg/mL   CBC with Auto Differential   Result Value Ref Range    WBC 5.4 4.0 - 11.0 K/uL    RBC 4.22 3.80 - 5.80 M/uL    Hemoglobin 13.6 11.5 - 16.5 g/dL    Hematocrit 40.4 37.0 - 47.0 %    MCV 95.7 80.0 - 100.0 fL    MCH 32.2 (H) 27.0 - 32.0 pg    MCHC 33.7 31.0 - 35.0 g/dL    RDW 13.8 11.0 - 16.0 %    Platelets 275 873 - 426 K/uL    MPV 9.8 6.0 - 10.0 fL    Neutrophils % 66.2 %    Immature Granulocytes % 0.4 0.0 - 5.0 %    Lymphocytes % 24.2 %    Monocytes % 6.6 %    Eosinophils % 2.0 %    Basophils % 0.6 %    Neutrophils Absolute 3.6 2.0 - 7.5 K/uL    Immature Granulocytes # 0.0 K/uL    Lymphocytes Absolute 1.3 (L) 1.5 - 4.0 K/uL    Monocytes Absolute 0.4 0.2 - 0.8 K/uL    Eosinophils Absolute 0.1 0.0 - 0.4 K/uL    Basophils Absolute 0.0 0.0 - 0.1 K/uL   Comprehensive Metabolic Panel   Result Value Ref Range    Sodium 138 136 - 145 mmol/L    Potassium 4.1 3.4 - 5.1 mmol/L    Chloride 102 98 - 107 mmol/L    CO2 25 20 - 30 mmol/L    Anion Gap 11 3 - 16    Glucose 119 (H) 74 - 106 mg/dL    BUN 11 6 - 20 mg/dL    CREATININE <0.5 0.4 - 1.2 mg/dL    GFR Non-African American >60 >59    GFR African American >59 >59    Calcium 9.9 8.5 - 10.5 mg/dL    Total Protein 6.4 6.4 - 8.3 g/dL    Albumin 3.8 3.4 - 4.8 g/dL    Albumin/Globulin Ratio 1.5 0.8 - 2.0    Total Bilirubin 0.6 0.3 - 1.2 mg/dL    Alkaline Phosphatase 77 25 - 100 U/L    ALT 21 4 - 36 U/L    AST 23 8 - 33 U/L    Globulin 2.6 Not Established g/dL   Lipase   Result Value Ref Range    Lipase 41.0 5.6 - 51.3 U/L   Troponin   Result Value Ref Range    Troponin <0.30 <0.30 ng/mL        All other labs were within normal range or not returned as of this dictation. EMERGENCY DEPARTMENT COURSE and DIFFERENTIAL DIAGNOSIS/MDM:   Vitals:    Vitals:    04/03/22 2200 04/03/22 2215 04/03/22 2230 04/03/22 2245   BP: (!) 186/102 (!) 168/81 (!) 170/85 (!) 165/80   Pulse: 75 80 79 81   Resp: 19 23 17 20   Temp:       TempSrc:       SpO2: 99% 99% 99% 99%   Weight:       Height:           CBC normal  CMP normal  BNP normal  Lipase normal  Troponin negative  No acute changes on EKG    Blood pressure came down with 1 dose of hydralazine 10 mg IV. Blood pressure was 165/80. This is an appropriate drop percentage. Will discharge patient home as she is asymptomatic. The patient will follow-up with their PCP in 1-2 days for reevaluation. If the patient or family members have any further concerns or any worsening symptoms they will return to the ED for reevaluation. CONSULTS:  None    PROCEDURES:  Procedures    CRITICAL CARE TIME    Total Critical Care time was 0 minutes, excluding separately reportable procedures. There was a high probability of clinically significant/life threatening deterioration in the patient's condition which required my urgent intervention. FINAL IMPRESSION      1.  Hypertension, unspecified type          DISPOSITION/PLAN   DISPOSITION Decision To Discharge 04/03/2022 10:53:18 PM      PATIENT REFERRED TO:  Bautista Najera, [address]   [phone]    Schedule an appointment as soon as possible for a visit in 2 days  As needed      DISCHARGE MEDICATIONS:  Discharge Medication List as of 4/3/2022 10:53 PM          Comment: Please note this report has been produced using speech recognition software and may contain errors related tothat system including errors in grammar, punctuation, and spelling, as well as words and phrases that may be inappropriate. If there are any questions or concerns please feel free to contact the dictating provider forclarification.     Sonal Holland MD  Attending Emergency Physician                  Sonal Holland MD  04/05/42 2094       Sonal Holland MD  04/11/22 2008

## 2022-06-14 ENCOUNTER — OFFICE VISIT (OUTPATIENT)
Dept: NEUROLOGY | Facility: CLINIC | Age: 57
End: 2022-06-14

## 2022-06-14 VITALS
OXYGEN SATURATION: 97 % | DIASTOLIC BLOOD PRESSURE: 76 MMHG | TEMPERATURE: 98 F | SYSTOLIC BLOOD PRESSURE: 128 MMHG | HEART RATE: 86 BPM

## 2022-06-14 DIAGNOSIS — E11.69 TYPE 2 DIABETES MELLITUS WITH OTHER SPECIFIED COMPLICATION, WITH LONG-TERM CURRENT USE OF INSULIN: ICD-10-CM

## 2022-06-14 DIAGNOSIS — I10 PRIMARY HYPERTENSION: ICD-10-CM

## 2022-06-14 DIAGNOSIS — Z86.73 HISTORY OF STROKE: Primary | ICD-10-CM

## 2022-06-14 DIAGNOSIS — Z79.4 TYPE 2 DIABETES MELLITUS WITH OTHER SPECIFIED COMPLICATION, WITH LONG-TERM CURRENT USE OF INSULIN: ICD-10-CM

## 2022-06-14 DIAGNOSIS — F33.0 MILD RECURRENT MAJOR DEPRESSION: ICD-10-CM

## 2022-06-14 DIAGNOSIS — E78.2 MIXED HYPERLIPIDEMIA: ICD-10-CM

## 2022-06-14 PROCEDURE — 99213 OFFICE O/P EST LOW 20 MIN: CPT | Performed by: CLINICAL NURSE SPECIALIST

## 2022-06-14 RX ORDER — CLONIDINE HYDROCHLORIDE 0.1 MG/1
TABLET ORAL
COMMUNITY
Start: 2022-04-05

## 2022-06-14 NOTE — PROGRESS NOTES
Follow Up Office Visit      Encounter Date: 2022   Patient Name: Jacqueline Rob  : 1965   MRN: [id number]   PCP: Lois Syed APRN    Referring Provider: No ref. provider found     Chief Complaint:    Chief Complaint   Patient presents with   • Follow-up   • Stroke       History of Present Illness:   3/14/2022  Jacqueline Rob is a 56 y.o. female with known medical diagnoses of hypertension, hyperlipidemia, uncontrolled diabetes mellitus type 2, recent COVID-19 infection (2022), and depression who presents to the stroke clinic today for follow-up.  The patient was admitted to [facility] on 2022 after experiencing right-sided weakness and dysarthria which she experienced 2 days prior.  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  She was discharged on  mg and atorvastatin 80 mg daily and completed inpatient rehabilitation at [facility] for approximately 3 weeks.  She continues to receive home health PT/OT and has made some improvements in movement of her right side.     The patient tells me that prior to her stroke she did not regularly see a physician therefore her diagnosis of diabetes, hypertension, and hyperlipidemia are all new.  She reports compliance with her medications and has made changes to her diet in an effort to lower her blood sugar and cholesterol.  She was recently placed on Zoloft for depression following her stroke and she reports that this has tremendously improved her mood.  Her spouse, who accompanies her to her appointment today is extremely involved in the patient's stroke recovery; support and rehabilitation.  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     The patient does have difficulty with urination since  her stroke and is scheduled to see a urologist sometime next week.  She currently has a Downing catheter in place and failed bladder retraining at [facility].    6/14/2022-patient presents today in follow-up for her previous left posterior limb of the internal capsule infarct from in January 2022.  She is accompanied today by her .  She does present in a wheelchair, although at home she is now able to ambulate some with a cane and assistance.  She continues physical therapy outpatient 3 times a week.  She has had significant improvement in right lower extremity weakness and some in her proximal right upper extremity.  She continues to have significant weakness in her right hand.  She continues to have a right homonymous hemianopsia.  When she was here previously she was having problems with urination and had a Downing catheter placed.  She has since seen urology, had her catheter removed and is urinating without problems.  She continues to take aspirin 81mg daily for secondary stroke prevention.  She is monitoring her blood pressures at home with guidelines for as needed clonidine for blood pressures systolically in the 170s.  According to her  about once a week they have to give the as needed meds.  She also brings in a copy of labs performed on 6/6/2002 by her primary care provider.  Those results are noted below.  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        Subjective        I have reviewed and the following portions of the patient's history were updated as appropriate: past family history, past medical history, past social history, past surgical history and problem list.    Medications:     Current Outpatient Medications:   •  Alogliptin Benzoate 25 MG tablet, Take  by mouth., Disp: , Rfl:   •  aspirin 81 MG chewable tablet, Chew 81 mg Daily., Disp: , Rfl:   •  atorvastatin (LIPITOR) 80 MG tablet, Take 1 tablet by mouth Every Night., Disp: , Rfl:   •  bethanechol  (URECHOLINE) 10 MG tablet, Take 10 mg by mouth 3 (Three) Times a Day., Disp: , Rfl:   •  carvedilol (COREG) 12.5 MG tablet, Take 12.5 mg by mouth., Disp: , Rfl:   •  cloNIDine (CATAPRES) 0.1 MG tablet, TAKE 1 TABLET BY MOUTH TWICE DAILY AS NEEDED FOR HIGH BLOOD PRESSURE, Disp: , Rfl:   •  escitalopram (LEXAPRO) 10 MG tablet, 20 mg., Disp: , Rfl:   •  lisinopril (PRINIVIL,ZESTRIL) 20 MG tablet, Take 1 tablet by mouth Daily., Disp: , Rfl:   •  Alogliptin Benzoate 25 MG tablet, Take 1 tablet by mouth Daily., Disp: , Rfl:   •  amLODIPine (NORVASC) 5 MG tablet, Take 1 tablet by mouth Daily., Disp: , Rfl:   •  aspirin 325 MG tablet, Take 1 tablet by mouth Daily., Disp: , Rfl:   •  B-D UF III MINI PEN NEEDLES 31G X 5 MM misc, , Disp: , Rfl:   •  ergocalciferol (ERGOCALCIFEROL) 1.25 MG (61439 UT) capsule, Take 1 capsule by mouth 1 (One) Time Per Week., Disp: , Rfl:   •  hydrocortisone 2.5 % cream, Apply  topically to the appropriate area as directed., Disp: , Rfl:   •  insulin aspart (novoLOG) 100 UNIT/ML injection, Inject 0-9 Units under the skin into the appropriate area as directed 3 (Three) Times a Day Before Meals., Disp: , Rfl: 12  •  insulin detemir (LEVEMIR) 100 UNIT/ML injection, Inject 10 Units under the skin into the appropriate area as directed Daily., Disp: , Rfl: 12  •  insulin glargine (LANTUS, SEMGLEE) 100 UNIT/ML injection, Inject 12 Units under the skin into the appropriate area as directed., Disp: , Rfl:   •  Januvia 100 MG tablet, Take 1 tablet by mouth Daily., Disp: , Rfl:   •  Lancets (OneTouch Delica Plus Scelca81G) misc, TEST BLOOD SUGAR TWICE A DAY AND AS NEEDED, Disp: , Rfl:   •  lisinopril (PRINIVIL,ZESTRIL) 10 MG tablet, , Disp: , Rfl:   •  metFORMIN (GLUCOPHAGE) 500 MG tablet, Take 1 tablet by mouth 2 (Two) Times a Day With Meals., Disp: , Rfl:   •  multivitamin with minerals tablet tablet, Take 1 tablet by mouth Daily., Disp: , Rfl:   •  OneTouch Ultra test strip, TEST BLOOD SUGAR TWICE A DAY  AND AS NEEDED, Disp: , Rfl:   •  pantoprazole (PROTONIX) 40 MG EC tablet, Take 40 mg by mouth Daily., Disp: , Rfl:   •  zinc sulfate (ZINCATE) 220 (50 Zn) MG capsule, Take 220 mg by mouth Daily., Disp: , Rfl:   •  zinc sulfate (ZINCATE) 220 (50 Zn) MG capsule, Take 1 capsule by mouth Daily., Disp: , Rfl:     Allergies:   No Known Allergies    Objective     Physical Exam:   Vital Signs:   Vitals:    06/14/22 1318   BP: 128/76   Pulse: 86   Temp: 98 °F (36.7 °C)   SpO2: 97%     There is no height or weight on file to calculate BMI.    Physical Exam  Vitals reviewed.   Constitutional:       Appearance: Normal appearance.   HENT:      Head: Normocephalic.      Mouth/Throat:      Mouth: Mucous membranes are moist.   Eyes:      Extraocular Movements: Extraocular movements intact.      Pupils: Pupils are equal, round, and reactive to light.   Pulmonary:      Effort: Pulmonary effort is normal.   Skin:     General: Skin is warm and dry.   Neurological:      Cranial Nerves: Cranial nerve deficit present.      Motor: Weakness present.      Coordination: Coordination is intact.      Deep Tendon Reflexes:      Reflex Scores:       Bicep reflexes are 2+ on the right side and 1+ on the left side.       Brachioradialis reflexes are 2+ on the right side and 1+ on the left side.       Patellar reflexes are 2+ on the right side.  Psychiatric:         Mood and Affect: Mood normal.         Speech: Speech normal.       Neurological Exam  Mental Status  Awake, alert and oriented to person, place and time. Speech is normal. Language is fluent with no aphasia. Able to perform serial calculations. Fund of knowledge is appropriate for level of education.    Cranial Nerves  CN II: Right homonymous hemianopsia.  CN III, IV, VI: Extraocular movements intact bilaterally. Pupils equal round and reactive to light bilaterally.  CN V: Facial sensation is normal.  CN VII: Full and symmetric facial movement.  CN VIII: Hearing appears intact.  CN IX,  X: Palate elevates symmetrically  CN XI: Shoulder shrug strength is normal.  CN XII: Tongue midline without atrophy or fasciculations.    Motor  Normal muscle bulk throughout. Normal muscle tone.                                             Right                     Left  Finger flexion                        1                           Finger extension                   0                           Finger abduction                   0                           Right upper extremity motor strength 2/5, right lower extremity 4/5.    Sensory  Light touch is normal in upper and lower extremities.     Reflexes                                            Right                      Left  Brachioradialis                    2+                         1+  Biceps                                 2+                         1+  Patellar                                2+                         Tr  Plantar                           Upgoing                Downgoing    Coordination    Finger-to-nose, rapid alternating movements and heel-to-shin normal bilaterally without dysmetria.    Gait    In wheelchair.        Procedures   Reviewed labs performed by primary care on 6/6/2022  Total cholesterol 114, LDL 54, hemoglobin A1c 5.3, AST 19, ALT 21      Assessment / Plan      Assessment/Plan:   Diagnoses and all orders for this visit:    1. History of stroke, left posterior limb of internal capsule (Primary  -Continue aspirin 81mg daily for secondary stroke prevention  -Continue work with physical therapy 3 times a week  -Discussed possibility of increased tone and spasticity and to call if this is noted      2. Type 2 diabetes mellitus with other specified complication, with long-term current use of insulin (HCC)  -Patient's recent hemoglobin A1c 5.30, she has done a great job getting her diabetes under control  -We will continue to work with primary care    3. Mixed hyperlipidemia  -Most recent LDL 54, again patient has done a great job  at getting her hyperlipidemia better controlled   -Continue statin for secondary stroke prevention    4. Primary hypertension  -We will continue to work with primary care to maintain normal blood pressure goals of systolic 120-150    5. Mild recurrent major depression (HCC)  -Continue taking Lexapro 20 mg daily  -She tells me with improvement of her functional status her mental status has also improved  -We will continue to work with primary care if depression were to worsen       Discussed above plan with patient and .  All questions answered.  Patient continues to improve.  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  We will see her back in about 6 months or sooner if needed.    Follow Up:   Return in about 6 months (around 12/14/2022).    LEANNE Nolasco  [facility]

## 2022-11-08 ENCOUNTER — TRANSCRIBE ORDERS (OUTPATIENT)
Dept: ADMINISTRATIVE | Age: 57
End: 2022-11-08

## 2022-11-08 DIAGNOSIS — I73.9 PERIPHERAL VASCULAR DISEASE, UNSPECIFIED (HCC): Primary | ICD-10-CM

## 2022-11-16 ENCOUNTER — HOSPITAL ENCOUNTER (OUTPATIENT)
Dept: ULTRASOUND IMAGING | Facility: HOSPITAL | Age: 57
Discharge: HOME | End: 2022-11-16
Payer: COMMERCIAL

## 2022-11-16 DIAGNOSIS — I73.9 PERIPHERAL VASCULAR DISEASE, UNSPECIFIED (HCC): ICD-10-CM

## 2022-11-16 PROCEDURE — 93926 LOWER EXTREMITY STUDY: CPT

## 2022-12-16 ENCOUNTER — OFFICE VISIT (OUTPATIENT)
Dept: NEUROLOGY | Facility: CLINIC | Age: 57
End: 2022-12-16

## 2022-12-16 VITALS
HEART RATE: 65 BPM | DIASTOLIC BLOOD PRESSURE: 78 MMHG | HEIGHT: 64 IN | SYSTOLIC BLOOD PRESSURE: 130 MMHG | TEMPERATURE: 97.7 F | BODY MASS INDEX: 20.66 KG/M2 | WEIGHT: 121 LBS | OXYGEN SATURATION: 98 %

## 2022-12-16 DIAGNOSIS — Z86.73 HISTORY OF STROKE: Primary | ICD-10-CM

## 2022-12-16 DIAGNOSIS — I69.398 SPASTICITY AS LATE EFFECT OF CEREBROVASCULAR ACCIDENT (CVA): ICD-10-CM

## 2022-12-16 DIAGNOSIS — E11.69 TYPE 2 DIABETES MELLITUS WITH OTHER SPECIFIED COMPLICATION, WITH LONG-TERM CURRENT USE OF INSULIN: ICD-10-CM

## 2022-12-16 DIAGNOSIS — R25.2 SPASTICITY AS LATE EFFECT OF CEREBROVASCULAR ACCIDENT (CVA): ICD-10-CM

## 2022-12-16 DIAGNOSIS — Z79.4 TYPE 2 DIABETES MELLITUS WITH OTHER SPECIFIED COMPLICATION, WITH LONG-TERM CURRENT USE OF INSULIN: ICD-10-CM

## 2022-12-16 DIAGNOSIS — E78.2 MIXED HYPERLIPIDEMIA: ICD-10-CM

## 2022-12-16 DIAGNOSIS — I10 PRIMARY HYPERTENSION: ICD-10-CM

## 2022-12-16 PROCEDURE — 99214 OFFICE O/P EST MOD 30 MIN: CPT | Performed by: CLINICAL NURSE SPECIALIST

## 2022-12-16 NOTE — PROGRESS NOTES
Follow Up Office Visit      Encounter Date: 2022   Patient Name: Jacqueline Rob  : 1965   MRN: [id number]   PCP: Lois Syed APRN    Referring Provider: No ref. provider found     Chief Complaint:    Chief Complaint   Patient presents with   • Follow-up       History of Present Illness:   3/14/2022  Jacqueline Rob is a 56 y.o. female with known medical diagnoses of hypertension, hyperlipidemia, uncontrolled diabetes mellitus type 2, recent COVID-19 infection (2022), and depression who presents to the stroke clinic today for follow-up.  The patient was admitted to [facility] on 2022 after experiencing right-sided weakness and dysarthria which she experienced 2 days prior.  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  She was discharged on  mg and atorvastatin 80 mg daily and completed inpatient rehabilitation at [facility] for approximately 3 weeks.  She continues to receive home health PT/OT and has made some improvements in movement of her right side.     The patient tells me that prior to her stroke she did not regularly see a physician therefore her diagnosis of diabetes, hypertension, and hyperlipidemia are all new.  She reports compliance with her medications and has made changes to her diet in an effort to lower her blood sugar and cholesterol.  She was recently placed on Zoloft for depression following her stroke and she reports that this has tremendously improved her mood.  Her spouse, who accompanies her to her appointment today is extremely involved in the patient's stroke recovery; support and rehabilitation.  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     The patient does have difficulty with urination since her stroke  and is scheduled to see a urologist sometime next week.  She currently has a Downing catheter in place and failed bladder retraining at [facility].     6/14/2022-patient presents today in follow-up for her previous left posterior limb of the internal capsule infarct from in January 2022.  She is accompanied today by her .  She does present in a wheelchair, although at home she is now able to ambulate some with a cane and assistance.  She continues physical therapy outpatient 3 times a week.  She has had significant improvement in right lower extremity weakness and some in her proximal right upper extremity.  She continues to have significant weakness in her right hand.  She continues to have a right homonymous hemianopsia.  When she was here previously she was having problems with urination and had a Downing catheter placed.  She has since seen urology, had her catheter removed and is urinating without problems.  She continues to take aspirin 81mg daily for secondary stroke prevention.  She is monitoring her blood pressures at home with guidelines for as needed clonidine for blood pressures systolically in the 170s.  According to her  about once a week they have to give the as needed meds.  She also brings in a copy of labs performed on 6/6/2002 by her primary care provider.  Those results are noted below.  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    12/15/2022-patient presents to clinic today accompanied by her .  She has had some improvement in her right sided symptoms as she is now able to walk with a cane.  She still has significant weakness and incoordination.  She continues outpatient physical therapy.  She continues on aspirin and atorvastatin daily for secondary stroke prevention.  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  She is okay with this decision and denies any new signs of depression.  She does have some increased spasticity in  "her right hand and increased tone in her right upper extremity.      Subjective        I have reviewed and the following portions of the patient's history were updated as appropriate: past family history, past medical history, past social history, past surgical history and problem list.    Medications:     Current Outpatient Medications:   •  Alogliptin Benzoate 25 MG tablet, Take  by mouth., Disp: , Rfl:   •  aspirin 81 MG chewable tablet, Chew 81 mg Daily., Disp: , Rfl:   •  atorvastatin (LIPITOR) 80 MG tablet, Take 1 tablet by mouth Every Night., Disp: , Rfl:   •  bethanechol (URECHOLINE) 10 MG tablet, Take 10 mg by mouth Daily., Disp: , Rfl:   •  carvedilol (COREG) 12.5 MG tablet, Take 12.5 mg by mouth 2 (Two) Times a Day With Meals., Disp: , Rfl:   •  cloNIDine (CATAPRES) 0.1 MG tablet, TAKE 1 TABLET BY MOUTH TWICE DAILY AS NEEDED FOR HIGH BLOOD PRESSURE, Disp: , Rfl:   •  escitalopram (LEXAPRO) 10 MG tablet, 20 mg., Disp: , Rfl:   •  lisinopril (PRINIVIL,ZESTRIL) 20 MG tablet, Take 1 tablet by mouth Daily. (Patient taking differently: Take 20 mg by mouth 2 (Two) Times a Day.), Disp: , Rfl:   •  metFORMIN (GLUCOPHAGE) 500 MG tablet, Take 1 tablet by mouth 2 (Two) Times a Day With Meals., Disp: , Rfl:     Allergies:   No Known Allergies    Objective     Physical Exam:   Vital Signs:   Vitals:    12/16/22 1247   BP: 130/78   Pulse: 65   Temp: 97.7 °F (36.5 °C)   SpO2: 98%   Weight: 54.9 kg (121 lb)   Height: 162.6 cm (64.02\")     Body mass index is 20.76 kg/m².    Physical Exam  Vitals reviewed.   HENT:      Head: Normocephalic.      Mouth/Throat:      Mouth: Mucous membranes are moist.   Eyes:      Extraocular Movements: Extraocular movements intact.   Pulmonary:      Effort: Pulmonary effort is normal.   Skin:     General: Skin is warm.   Neurological:      Motor: Weakness present.      Coordination: Coordination abnormal.   Psychiatric:         Mood and Affect: Mood normal.         Speech: Speech normal. "       Neurological Exam  Mental Status  Awake, alert and oriented to person, place and time. Speech is normal. Language is fluent with no aphasia. Fund of knowledge is appropriate for level of education.    Cranial Nerves  CN II: Visual fields full to confrontation.  CN III, IV, VI: Extraocular movements intact bilaterally.  CN V: Facial sensation is normal.  CN VII: Full and symmetric facial movement.  CN VIII: Hearing appears intact.  CN XI:  Right: Sternocleidomastoid strength is weak. Trapezius strength is weak.  CN XII: Tongue midline without atrophy or fasciculations.    Motor   Increased muscle tone. Increased tone noted in the right upper extremity.                                             Right                     Left  Wrist flexion                          1                           Finger flexion                        0                           Finger extension                   1                           Plantarflexion                        2                           Dorsiflexion                           2                           Right upper extremity 2/5, right lower extremity drift.    Sensory  Light touch is normal in upper and lower extremities.     Coordination  Right: Heel-to-shin abnormality:  Dysmetria noted on right heel-to-shin.    Gait  Casual gait: Reduced right arm swing.  Hemiplegic gait.        Procedures  9/29/2022-most recent labs collected by primary care include AST 21, ALT 22, hemoglobin A1c 5.5    Assessment / Plan      Assessment/Plan:   Diagnoses and all orders for this visit:    1. History of stroke (Primary)  -Continue aspirin daily for secondary stroke prevention  -Reviewed signs and symptoms of stroke and when to call 911  -Patient was seen by Dr. Ingram for assessment of Botox    2. Primary hypertension  -Patient's blood pressure today is 130/78, she will continue to work closely with primary care to maintain normotensive blood pressure  -Systolic blood  pressure goal 120-140      3. Mixed hyperlipidemia  -Continue atorvastatin 80 mg daily  -Most recent LDL is 54, she will continue to work closely with primary care to ensure LDL stays less than 70    4. Type 2 diabetes mellitus with other specified complication, with long-term current use of insulin (HCC)  -Most recent hemoglobin A1c was 5.5, goal less than 7  -Continue to work closely with primary care to continue to maintain that goal    5. Spasticity as late effect of cerebrovascular accident (CVA)  -Patient was evaluated while she was in clinic today by Dr. Ingram for possible Botox for her right upper extremity and hand         Follow Up:   Return for F/U with Clarice for Botox.    LEANNE Nolasco  [facility] [place] [facility]

## 2024-04-16 ENCOUNTER — TELEPHONE (OUTPATIENT)
Dept: CARDIOLOGY | Facility: CLINIC | Age: 59
End: 2024-04-16

## 2024-04-16 ENCOUNTER — OFFICE VISIT (OUTPATIENT)
Dept: CARDIOLOGY | Facility: CLINIC | Age: 59
End: 2024-04-16
Payer: COMMERCIAL

## 2024-04-16 ENCOUNTER — HOSPITAL ENCOUNTER (OUTPATIENT)
Facility: HOSPITAL | Age: 59
Discharge: HOME | End: 2024-04-16
Payer: COMMERCIAL

## 2024-04-16 VITALS
HEART RATE: 61 BPM | BODY MASS INDEX: 22.99 KG/M2 | OXYGEN SATURATION: 95 % | SYSTOLIC BLOOD PRESSURE: 124 MMHG | DIASTOLIC BLOOD PRESSURE: 70 MMHG | HEIGHT: 65 IN | WEIGHT: 138 LBS

## 2024-04-16 DIAGNOSIS — I10 PRIMARY HYPERTENSION: Primary | ICD-10-CM

## 2024-04-16 DIAGNOSIS — E78.2 MIXED HYPERLIPIDEMIA: ICD-10-CM

## 2024-04-16 PROCEDURE — 99204 OFFICE O/P NEW MOD 45 MIN: CPT | Performed by: INTERNAL MEDICINE

## 2024-04-16 PROCEDURE — 93005 ELECTROCARDIOGRAM TRACING: CPT

## 2024-04-16 PROCEDURE — 93000 ELECTROCARDIOGRAM COMPLETE: CPT | Performed by: INTERNAL MEDICINE

## 2024-04-16 RX ORDER — AMLODIPINE BESYLATE 5 MG/1
5 TABLET ORAL DAILY
COMMUNITY
Start: 2024-03-25 | End: 2024-04-16

## 2024-04-16 RX ORDER — HYDRALAZINE HYDROCHLORIDE 25 MG/1
25 TABLET, FILM COATED ORAL AS NEEDED
COMMUNITY
Start: 2024-04-09

## 2024-04-16 RX ORDER — CARVEDILOL 12.5 MG/1
12.5 TABLET ORAL 2 TIMES DAILY
Qty: 180 TABLET | Refills: 3 | Status: SHIPPED | OUTPATIENT
Start: 2024-04-16

## 2024-04-16 RX ORDER — BUSPIRONE HYDROCHLORIDE 5 MG/1
5 TABLET ORAL 3 TIMES DAILY PRN
COMMUNITY
Start: 2024-03-27

## 2024-04-16 RX ORDER — LISINOPRIL 20 MG/1
20 TABLET ORAL 2 TIMES DAILY
Qty: 180 TABLET | Refills: 3 | Status: SHIPPED | OUTPATIENT
Start: 2024-04-16 | End: 2024-04-17 | Stop reason: DRUGHIGH

## 2024-04-16 NOTE — PROGRESS NOTES
[facility] Cardiology  Consultation H&P  Jacqueline Rob  1965  [phone]  There is no work phone number on file..    VISIT DATE:  04/16/2024    PCP: Lois Syed APRN  [address] [place] [id number]    CC:  Chief Complaint   Patient presents with    Hypertension       Previous cardiac studies and procedures:  January 2022   MRI brain: acute left basal ganglia infarct.  CTA head and neck: Unremarkable  TTE  1. Normal left ventricular size and systolic function, LVEF 65-70%.  2. Mild to moderate concentric LVH.  3. Normal LV diastolic filling pattern.  4. Normal right ventricular size and systolic function.  5. Normal left and right atrial size.  6. No significant valvular abnormalities.  7. Negative bubble study with no evidence of intracardiac or intrapulmonary shunt.    March 2024  TTE: Normal EF, mild diastolic dysfunction, mild MR  MRI: No acute infarct  Carotid duplex: Less than 50% bilaterally.    ASSESSMENT:   Diagnosis Plan   1. Primary hypertension        2. Mixed hyperlipidemia              PLAN:  Hypertension: Goal less than 130/80 mmHg.  Very sensitive to emotional stress.  Would recommend transitioning back to her previous medical regimen, restarting lisinopril 20 mg p.o. twice daily and carvedilol 12.5 mg p.o. twice daily.  Continue keep home blood pressure log.  If blood pressures are not adequately controlled would increase carvedilol to 25 mg p.o. twice daily first prior to potentially restarting amlodipine at 2.5 mg p.o. nightly.    Hyperlipidemia: Goal LDL less than 70.  Continue atorvastatin 80 mg p.o. daily.    History of Present Illness   58-year-old female with a history of stroke induced by hypertensive emergency.  Residual right-sided weakness, uses a cane for ambulation.  Denies chest pain, palpitations or dyspnea on exertion.  Recently had an episode of hypertensive urgency induced by emotional stress.  Treated with Cardene drip.  Acute stroke  "ruled out.  Stable myocardial structure and function on transthoracic echo and no significant carotid atherosclerotic disease was noted.  She returned to her baseline state of health.  Antihypertensive regimen was adjusted, lisinopril was changed from 20 mg p.o. twice daily to 40 mg p.o. every morning.  Carvedilol was changed to once daily dosing at nighttime and amlodipine was added to her regimen.  She reports that her blood pressures dropped less than 100/70 mmHg last evening after she had been sitting outside most the day.  She previously ports that her blood pressures on her prior regimen typically run less than 140/90 mmHg.  Clonidine seems to work very effectively for acute spikes in blood pressure.    PHYSICAL EXAMINATION:  Vitals:    04/16/24 0938   BP: 124/70   BP Location: Left arm   Patient Position: Sitting   Cuff Size: Adult   Pulse: 61   SpO2: 95%   Weight: 62.6 kg (138 lb)   Height: 165.1 cm (65\")     General Appearance:    Alert, cooperative, no distress, appears stated age   Head:    Normocephalic, without obvious abnormality, atraumatic   Eyes:    conjunctiva/corneas clear, EOM's intact, fundi     benign, both eyes   Ears:    Normal TM's and external ear canals, both ears   Nose:   Nares normal, septum midline, mucosa normal, no drainage    or sinus tenderness   Throat:   Lips, mucosa, and tongue normal; teeth and gums normal   Neck:   Supple, symmetrical, trachea midline, no adenopathy;     thyroid:  no enlargement/tenderness/nodules; no carotid    bruit or JVD   Back:     Symmetric, no curvature, ROM normal, no CVA tenderness   Lungs:     Clear to auscultation bilaterally, respirations unlabored   Chest Wall:    No tenderness or deformity    Heart:    Regular rate and rhythm, S1 and S2 normal, no murmur, rub   or gallop, normal carotid impulse bilaterally without bruit.   Abdomen:     Soft, non-tender, bowel sounds active all four quadrants,     no masses, no organomegaly   Extremities:   " Extremities normal, atraumatic, no cyanosis or edema   Pulses:   2+ and symmetric all extremities   Skin:   Skin color, texture, turgor normal, no rashes or lesions   Lymph nodes:   Cervical, supraclavicular, and axillary nodes normal   Neurologic: Diffuse right-sided weakness.       Diagnostic Data:    ECG 12 Lead    Date/Time: 4/16/2024 9:55 AM  Performed by: Parish Lerma III, MD    Authorized by: Parish Lerma III, MD  Comparison: compared with previous ECG from 1/26/2022  Similar to previous ECG  Rhythm: sinus rhythm    Clinical impression: normal ECG        Lab Results   Component Value Date    TRIG 313 (H) 01/27/2022    HDL 36 (L) 01/27/2022     Lab Results   Component Value Date    GLUCOSE 300 (H) 01/28/2022    BUN 18 01/28/2022    CREATININE 0.57 01/28/2022     (L) 01/28/2022    K 3.3 (L) 01/28/2022     01/28/2022    CO2 20.4 (L) 01/28/2022     Lab Results   Component Value Date    HGBA1C 12.30 (H) 01/26/2022     Lab Results   Component Value Date    WBC 5.4 04/03/2022    HGB 13.6 04/03/2022    HCT 40.4 04/03/2022     04/03/2022       PROBLEM LIST:  Patient Active Problem List   Diagnosis    Cerebrovascular accident (CVA)    ERRONEOUS ENCOUNTER--DISREGARD    History of stroke, left posterior limb of internal capsule    Hypertension    Hyperlipidemia    Diabetes mellitus    Mild recurrent major depression       PAST MEDICAL HX  Past Medical History:   Diagnosis Date    Diabetes mellitus     Hyperlipidemia     Hypertension     Skin cancer (melanoma)     right breasr    Skin melanoma     Stroke        Allergies  No Known Allergies    Current Medications    Current Outpatient Medications:     aspirin 81 MG chewable tablet, Chew 1 tablet Daily., Disp: , Rfl:     atorvastatin (LIPITOR) 80 MG tablet, Take 1 tablet by mouth Every Night., Disp: , Rfl:     busPIRone (BUSPAR) 5 MG tablet, Take 1 tablet by mouth 3 (Three) Times a Day As Needed. for anxiety, Disp: , Rfl:     cloNIDine (CATAPRES) 0.1  MG tablet, TAKE 1 TABLET BY MOUTH TWICE DAILY AS NEEDED FOR HIGH BLOOD PRESSURE, Disp: , Rfl:     escitalopram (LEXAPRO) 10 MG tablet, 2 tablets., Disp: , Rfl:     hydrALAZINE (APRESOLINE) 25 MG tablet, Take 1 tablet by mouth As Needed., Disp: , Rfl:     metFORMIN (GLUCOPHAGE) 500 MG tablet, Take 1 tablet by mouth 2 (Two) Times a Day With Meals., Disp: , Rfl:     Alogliptin Benzoate 25 MG tablet, Take  by mouth. (Patient not taking: Reported on 4/16/2024), Disp: , Rfl:     bethanechol (URECHOLINE) 10 MG tablet, Take 10 mg by mouth Daily. (Patient not taking: Reported on 4/16/2024), Disp: , Rfl:     carvedilol (COREG) 12.5 MG tablet, Take 1 tablet by mouth 2 (Two) Times a Day., Disp: 180 tablet, Rfl: 3    lisinopril (PRINIVIL,ZESTRIL) 20 MG tablet, Take 1 tablet by mouth 2 (Two) Times a Day., Disp: 180 tablet, Rfl: 3         ROS  ROS      SOCIAL HX  Social History     Socioeconomic History    Marital status:    Tobacco Use    Smoking status: Never     Passive exposure: Never    Smokeless tobacco: Never   Vaping Use    Vaping status: Never Used   Substance and Sexual Activity    Alcohol use: Never    Drug use: Never    Sexual activity: Defer       FAMILY HX  Family History   Problem Relation Age of Onset    Brain cancer Mother     Colon cancer Father              Parish Lerma III, MD, FACC

## 2024-04-16 NOTE — TELEPHONE ENCOUNTER
Received notification from the pharmacy that her Insurance will not cover Lisinopril 20 mg tablets, take one tablet twice a day.They will pay for a higher strength, one per day. Ok to send in as Lisinopril  40 mg tablets. Take 1/2 tablet by mouth twice a day. Pharmacist stated they would cover it this way. Please advise.

## 2024-04-17 RX ORDER — LISINOPRIL 40 MG/1
20 TABLET ORAL 2 TIMES DAILY
Qty: 90 TABLET | Refills: 1 | Status: SHIPPED | OUTPATIENT
Start: 2024-04-17

## 2024-08-08 ENCOUNTER — OFFICE VISIT (OUTPATIENT)
Dept: CARDIOLOGY | Facility: CLINIC | Age: 59
End: 2024-08-08
Payer: COMMERCIAL

## 2024-08-08 VITALS
BODY MASS INDEX: 23.32 KG/M2 | HEART RATE: 63 BPM | WEIGHT: 140 LBS | SYSTOLIC BLOOD PRESSURE: 114 MMHG | HEIGHT: 65 IN | DIASTOLIC BLOOD PRESSURE: 64 MMHG | OXYGEN SATURATION: 98 %

## 2024-08-08 DIAGNOSIS — E78.2 MIXED HYPERLIPIDEMIA: Primary | ICD-10-CM

## 2024-08-08 DIAGNOSIS — I10 PRIMARY HYPERTENSION: ICD-10-CM

## 2024-08-08 PROCEDURE — 99213 OFFICE O/P EST LOW 20 MIN: CPT | Performed by: INTERNAL MEDICINE

## 2024-08-08 RX ORDER — ESCITALOPRAM OXALATE 20 MG/1
20 TABLET ORAL DAILY
COMMUNITY
Start: 2024-08-07

## 2024-08-08 RX ORDER — HYDRALAZINE HYDROCHLORIDE 25 MG/1
25 TABLET, FILM COATED ORAL 2 TIMES DAILY
COMMUNITY

## 2024-08-08 NOTE — PROGRESS NOTES
[facility] Cardiology  Office visit  Jacqueline Rob  1965  [phone]  There is no work phone number on file.    VISIT DATE:  8/8/2024    PCP: Lois Syed APRN  [address] [place] [id number]    CC:  Chief Complaint   Patient presents with    Primary hypertension       Previous cardiac studies and procedures:  January 2022   MRI brain: acute left basal ganglia infarct.  CTA head and neck: Unremarkable  TTE  1. Normal left ventricular size and systolic function, LVEF 65-70%.  2. Mild to moderate concentric LVH.  3. Normal LV diastolic filling pattern.  4. Normal right ventricular size and systolic function.  5. Normal left and right atrial size.  6. No significant valvular abnormalities.  7. Negative bubble study with no evidence of intracardiac or intrapulmonary shunt.    March 2024  TTE: Normal EF, mild diastolic dysfunction, mild MR  MRI: No acute infarct  Carotid duplex: Less than 50% bilaterally.    ASSESSMENT:   Diagnosis Plan   1. Mixed hyperlipidemia        2. Primary hypertension            PLAN:  Hypertension: Goal less than 130/80 mmHg.  Very sensitive to emotional stress.  Continue carvedilol 12.5 mg p.o. twice daily, hydralazine 25 mg p.o. twice daily and lisinopril 20 mg p.o. twice daily.  Currently well-controlled.      Hyperlipidemia: Goal LDL less than 70.  Continue atorvastatin 80 mg p.o. daily.    History of CVA with residual right-sided weakness    Subjective  Interval assessment: Blood pressures running less than 130/80 mmHg on current medical therapy.  Denies chest pain, palpitations or dyspnea.  Compliant with medical therapy.    Initial evaluation: 58-year-old female with a history of stroke induced by hypertensive emergency. Residual right-sided weakness, uses a cane for ambulation. Denies chest pain, palpitations or dyspnea on exertion. Recently had an episode of hypertensive urgency induced by emotional stress. Treated with Cardene drip. Acute  "stroke ruled out. Stable myocardial structure and function on transthoracic echo and no significant carotid atherosclerotic disease was noted. She returned to her baseline state of health. Antihypertensive regimen was adjusted, lisinopril was changed from 20 mg p.o. twice daily to 40 mg p.o. every morning. Carvedilol was changed to once daily dosing at nighttime and amlodipine was added to her regimen. She reports that her blood pressures dropped less than 100/70 mmHg last evening after she had been sitting outside most the day. She previously ports that her blood pressures on her prior regimen typically run less than 140/90 mmHg. Clonidine seems to work very effectively for acute spikes in blood pressure.     PHYSICAL EXAMINATION:  Vitals:    08/08/24 1408   BP: 114/64   BP Location: Right arm   Patient Position: Sitting   Pulse: 63   SpO2: 98%   Weight: 63.5 kg (140 lb)   Height: 165.1 cm (65\")     General Appearance:    Alert, cooperative, no distress, appears stated age   Head:    Normocephalic, without obvious abnormality, atraumatic   Eyes:    conjunctiva/corneas clear   Nose:   Nares normal, septum midline, mucosa normal, no drainage   Throat:   Lips, teeth and gums normal   Neck:   Supple, symmetrical, trachea midline, no carotid    bruit or JVD   Lungs:     Clear to auscultation bilaterally, respirations unlabored   Chest Wall:    No tenderness or deformity    Heart:    Regular rate and rhythm, S1 and S2 normal, no murmur, rub   or gallop, normal carotid impulse bilaterally without bruit.   Abdomen:     Soft, non-tender   Extremities:   Extremities normal, atraumatic, no cyanosis or edema   Pulses:   2+ and symmetric all extremities   Skin:   Skin color, texture, turgor normal, no rashes or lesions       Diagnostic Data:  Procedures  Lab Results   Component Value Date    TRIG 313 (H) 01/27/2022    HDL 36 (L) 01/27/2022     Lab Results   Component Value Date    GLUCOSE 300 (H) 01/28/2022    BUN 18 01/28/2022 "    CREATININE 0.57 01/28/2022     (L) 01/28/2022    K 3.3 (L) 01/28/2022     01/28/2022    CO2 20.4 (L) 01/28/2022     Lab Results   Component Value Date    HGBA1C 12.30 (H) 01/26/2022     Lab Results   Component Value Date    WBC 5.4 04/03/2022    HGB 13.6 04/03/2022    HCT 40.4 04/03/2022     04/03/2022       Allergies  No Known Allergies    Current Medications    Current Outpatient Medications:     aspirin 81 MG EC tablet, Take 1 tablet by mouth Daily., Disp: , Rfl:     atorvastatin (LIPITOR) 80 MG tablet, Take 1 tablet by mouth Every Night., Disp: , Rfl:     carvedilol (COREG) 12.5 MG tablet, Take 1 tablet by mouth 2 (Two) Times a Day., Disp: 180 tablet, Rfl: 3    cloNIDine (CATAPRES) 0.1 MG tablet, TAKE 1 TABLET BY MOUTH TWICE DAILY AS NEEDED FOR HIGH BLOOD PRESSURE, Disp: , Rfl:     escitalopram (LEXAPRO) 20 MG tablet, Take 1 tablet by mouth Daily., Disp: , Rfl:     hydrALAZINE (APRESOLINE) 25 MG tablet, Take 1 tablet by mouth As Needed., Disp: , Rfl:     hydrALAZINE (APRESOLINE) 25 MG tablet, Take 1 tablet by mouth 2 (Two) Times a Day., Disp: , Rfl:     lisinopril (PRINIVIL,ZESTRIL) 40 MG tablet, Take 0.5 tablets by mouth 2 (Two) Times a Day., Disp: 90 tablet, Rfl: 1    metFORMIN (GLUCOPHAGE) 500 MG tablet, Take 1 tablet by mouth 2 (Two) Times a Day With Meals., Disp: , Rfl:           ROS  ROS      SOCIAL HX  Social History     Socioeconomic History    Marital status:    Tobacco Use    Smoking status: Never     Passive exposure: Never    Smokeless tobacco: Never   Vaping Use    Vaping status: Never Used   Substance and Sexual Activity    Alcohol use: Never    Drug use: Never    Sexual activity: Defer       FAMILY HX  Family History   Problem Relation Age of Onset    Brain cancer Mother     Colon cancer Father              Parish Lerma III, MD, [facility]

## 2024-12-03 ENCOUNTER — TELEPHONE (OUTPATIENT)
Dept: CARDIOLOGY | Facility: CLINIC | Age: 59
End: 2024-12-03
Payer: COMMERCIAL

## 2024-12-03 NOTE — TELEPHONE ENCOUNTER
Caller: Jacqueline Rob    Relationship: Self    Best call back number: [phone]    What is the best time to reach you: ANYTIME    What was the call regarding: PT HAS BEEN HAVING ISSUES WITH BLOOD PRESSURE BEING ELEVATED 186 ONE NIGHT  ONE NIGHT, ABOUT A WEEK APART OTHER THAN THAT HAS BEEN GOOD. COMES DOWN CLONIDINE. PT WOULD LIKE TO GET IN TO SEE DR. GREEN FOR THIS. PLEASE CALL BACK TO ADVISE, THANK YOU.

## 2024-12-03 NOTE — TELEPHONE ENCOUNTER
Instructed to keep a b/p log for the next week and to call us back with the report. Please advise if any further instructions.

## 2024-12-10 RX ORDER — HYDRALAZINE HYDROCHLORIDE 50 MG/1
50 TABLET, FILM COATED ORAL 2 TIMES DAILY
Qty: 180 TABLET | Refills: 3 | Status: SHIPPED | OUTPATIENT
Start: 2024-12-10

## 2024-12-10 NOTE — TELEPHONE ENCOUNTER
BP log is as follows:  12/3 130/68 HR 64, 138/80 HR 61  12/4 127/73 HR 66  12/5 155/92 HR 61, 168/109 **took Clonidine** BP after--> 105/62 HR 66  12/6 136/85 HR 61, 148/93 HR 67  12/7 147/99 HR 60, 149/108 HR 60 **took hydralazine** BP after--> 141/83 HR 66  12/8 158/91 HR 67, 150/83 HR 65  12/9 151/92, 169/95 HR 73 **took Clonidine** BP after-->116/69 HR 66    Confirmed BP medications:  Lisinopril 20mg BID  Coreg 12.5mg BID  Hydralazine 25mg BID and PRN (she's more often than not taking this TID)  Clonidine 0.1mg PRN    Please advise.